# Patient Record
Sex: FEMALE | Race: WHITE | NOT HISPANIC OR LATINO | Employment: OTHER | URBAN - METROPOLITAN AREA
[De-identification: names, ages, dates, MRNs, and addresses within clinical notes are randomized per-mention and may not be internally consistent; named-entity substitution may affect disease eponyms.]

---

## 2018-04-06 ENCOUNTER — TRANSCRIBE ORDERS (OUTPATIENT)
Dept: ADMINISTRATIVE | Facility: HOSPITAL | Age: 63
End: 2018-04-06

## 2018-04-06 DIAGNOSIS — I71.9 AORTIC ANEURYSM WITHOUT RUPTURE, UNSPECIFIED PORTION OF AORTA (HCC): Primary | ICD-10-CM

## 2018-04-13 ENCOUNTER — HOSPITAL ENCOUNTER (OUTPATIENT)
Dept: CT IMAGING | Facility: HOSPITAL | Age: 63
Discharge: HOME | End: 2018-04-13
Attending: FAMILY MEDICINE | Admitting: FAMILY MEDICINE

## 2018-04-13 DIAGNOSIS — I71.9 AORTIC ANEURYSM WITHOUT RUPTURE, UNSPECIFIED PORTION OF AORTA (HCC): ICD-10-CM

## 2018-04-13 PROCEDURE — 71250 CT THORAX DX C-: CPT

## 2018-06-25 ENCOUNTER — HOSPITAL ENCOUNTER (OUTPATIENT)
Facility: HOSPITAL | Age: 63
Setting detail: OBSERVATION
End: 2018-06-26
Attending: EMERGENCY MEDICINE | Admitting: FAMILY MEDICINE

## 2018-06-25 ENCOUNTER — APPOINTMENT (OUTPATIENT)
Dept: CT IMAGING | Facility: HOSPITAL | Age: 63
End: 2018-06-25

## 2018-06-25 DIAGNOSIS — R11.2 NON-INTRACTABLE VOMITING WITH NAUSEA, UNSPECIFIED VOMITING TYPE: ICD-10-CM

## 2018-06-25 DIAGNOSIS — R10.13 EPIGASTRIC PAIN: Primary | ICD-10-CM

## 2018-06-25 DIAGNOSIS — R77.8 ELEVATED TROPONIN: ICD-10-CM

## 2018-06-25 LAB
ALBUMIN SERPL-MCNC: 4.6 G/DL (ref 3.5–5.2)
ALBUMIN/GLOB SERPL: 1.8 G/DL
ALP SERPL-CCNC: 77 U/L (ref 40–129)
ALT SERPL W P-5'-P-CCNC: 23 U/L (ref 5–33)
ANION GAP SERPL CALCULATED.3IONS-SCNC: 17.5 MMOL/L
AST SERPL-CCNC: 26 U/L (ref 5–32)
BACTERIA UR QL AUTO: ABNORMAL /HPF
BASOPHILS # BLD AUTO: 0.04 10*3/MM3 (ref 0–0.2)
BASOPHILS NFR BLD AUTO: 0.3 % (ref 0–2)
BILIRUB SERPL-MCNC: 0.5 MG/DL (ref 0.2–1.2)
BILIRUB UR QL STRIP: NEGATIVE
BUN BLD-MCNC: 16 MG/DL (ref 8–23)
BUN/CREAT SERPL: 16 (ref 7–25)
CALCIUM SPEC-SCNC: 9.9 MG/DL (ref 8.8–10.5)
CHLORIDE SERPL-SCNC: 100 MMOL/L (ref 98–107)
CLARITY UR: CLEAR
CO2 SERPL-SCNC: 22.5 MMOL/L (ref 22–29)
COLOR UR: YELLOW
CREAT BLD-MCNC: 1 MG/DL (ref 0.57–1)
DEPRECATED RDW RBC AUTO: 42.6 FL (ref 37–54)
EOSINOPHIL # BLD AUTO: 0 10*3/MM3 (ref 0.1–0.3)
EOSINOPHIL NFR BLD AUTO: 0 % (ref 0–4)
ERYTHROCYTE [DISTWIDTH] IN BLOOD BY AUTOMATED COUNT: 13.1 % (ref 11.5–14.5)
GFR SERPL CREATININE-BSD FRML MDRD: 56 ML/MIN/1.73
GLOBULIN UR ELPH-MCNC: 2.6 GM/DL
GLUCOSE BLD-MCNC: 147 MG/DL (ref 65–99)
GLUCOSE UR STRIP-MCNC: NEGATIVE MG/DL
HCT VFR BLD AUTO: 40.3 % (ref 37–47)
HGB BLD-MCNC: 13.9 G/DL (ref 12–16)
HGB UR QL STRIP.AUTO: NEGATIVE
HYALINE CASTS UR QL AUTO: ABNORMAL /LPF
IMM GRANULOCYTES # BLD: 0.05 10*3/MM3 (ref 0–0.03)
IMM GRANULOCYTES NFR BLD: 0.4 % (ref 0–0.5)
KETONES UR QL STRIP: ABNORMAL
LEUKOCYTE ESTERASE UR QL STRIP.AUTO: NEGATIVE
LIPASE SERPL-CCNC: 47 U/L (ref 13–60)
LYMPHOCYTES # BLD AUTO: 1.46 10*3/MM3 (ref 0.6–4.8)
LYMPHOCYTES NFR BLD AUTO: 10.3 % (ref 20–45)
MCH RBC QN AUTO: 30.8 PG (ref 27–31)
MCHC RBC AUTO-ENTMCNC: 34.5 G/DL (ref 31–37)
MCV RBC AUTO: 89.2 FL (ref 81–99)
MONOCYTES # BLD AUTO: 0.7 10*3/MM3 (ref 0–1)
MONOCYTES NFR BLD AUTO: 4.9 % (ref 3–8)
NEUTROPHILS # BLD AUTO: 11.91 10*3/MM3 (ref 1.5–8.3)
NEUTROPHILS NFR BLD AUTO: 84.1 % (ref 45–70)
NITRITE UR QL STRIP: NEGATIVE
NRBC BLD MANUAL-RTO: 0 /100 WBC (ref 0–0)
PH UR STRIP.AUTO: 7.5 [PH] (ref 4.5–8)
PLATELET # BLD AUTO: 216 10*3/MM3 (ref 140–500)
PMV BLD AUTO: 11.4 FL (ref 7.4–10.4)
POTASSIUM BLD-SCNC: 3.9 MMOL/L (ref 3.5–5.2)
PROT SERPL-MCNC: 7.2 G/DL (ref 6–8.5)
PROT UR QL STRIP: ABNORMAL
RBC # BLD AUTO: 4.52 10*6/MM3 (ref 4.2–5.4)
RBC # UR: ABNORMAL /HPF
REF LAB TEST METHOD: ABNORMAL
SODIUM BLD-SCNC: 140 MMOL/L (ref 136–145)
SP GR UR STRIP: 1.01 (ref 1–1.03)
SQUAMOUS #/AREA URNS HPF: ABNORMAL /HPF
TROPONIN T SERPL-MCNC: 0.11 NG/ML (ref 0–0.03)
UROBILINOGEN UR QL STRIP: ABNORMAL
WBC NRBC COR # BLD: 14.16 10*3/MM3 (ref 4.8–10.8)
WBC UR QL AUTO: ABNORMAL /HPF

## 2018-06-25 PROCEDURE — 96372 THER/PROPH/DIAG INJ SC/IM: CPT

## 2018-06-25 PROCEDURE — 83690 ASSAY OF LIPASE: CPT | Performed by: PHYSICIAN ASSISTANT

## 2018-06-25 PROCEDURE — 96361 HYDRATE IV INFUSION ADD-ON: CPT

## 2018-06-25 PROCEDURE — 80053 COMPREHEN METABOLIC PANEL: CPT | Performed by: PHYSICIAN ASSISTANT

## 2018-06-25 PROCEDURE — 25010000002 ONDANSETRON PER 1 MG: Performed by: PHYSICIAN ASSISTANT

## 2018-06-25 PROCEDURE — 84484 ASSAY OF TROPONIN QUANT: CPT | Performed by: PHYSICIAN ASSISTANT

## 2018-06-25 PROCEDURE — 96374 THER/PROPH/DIAG INJ IV PUSH: CPT

## 2018-06-25 PROCEDURE — G0378 HOSPITAL OBSERVATION PER HR: HCPCS

## 2018-06-25 PROCEDURE — 96376 TX/PRO/DX INJ SAME DRUG ADON: CPT

## 2018-06-25 PROCEDURE — 25010000002 IOPAMIDOL 61 % SOLUTION: Performed by: EMERGENCY MEDICINE

## 2018-06-25 PROCEDURE — 74177 CT ABD & PELVIS W/CONTRAST: CPT

## 2018-06-25 PROCEDURE — 96375 TX/PRO/DX INJ NEW DRUG ADDON: CPT

## 2018-06-25 PROCEDURE — 99285 EMERGENCY DEPT VISIT HI MDM: CPT

## 2018-06-25 PROCEDURE — 85025 COMPLETE CBC W/AUTO DIFF WBC: CPT | Performed by: PHYSICIAN ASSISTANT

## 2018-06-25 PROCEDURE — 25010000002 HYDROMORPHONE PER 4 MG: Performed by: EMERGENCY MEDICINE

## 2018-06-25 PROCEDURE — 81001 URINALYSIS AUTO W/SCOPE: CPT | Performed by: PHYSICIAN ASSISTANT

## 2018-06-25 PROCEDURE — 93005 ELECTROCARDIOGRAM TRACING: CPT | Performed by: PHYSICIAN ASSISTANT

## 2018-06-25 PROCEDURE — 25010000002 ONDANSETRON PER 1 MG

## 2018-06-25 PROCEDURE — 25010000002 ENOXAPARIN PER 10 MG: Performed by: PHYSICIAN ASSISTANT

## 2018-06-25 RX ORDER — SODIUM CHLORIDE 9 MG/ML
40 INJECTION, SOLUTION INTRAVENOUS AS NEEDED
Status: DISCONTINUED | OUTPATIENT
Start: 2018-06-25 | End: 2018-06-26 | Stop reason: HOSPADM

## 2018-06-25 RX ORDER — SODIUM CHLORIDE 0.9 % (FLUSH) 0.9 %
1-10 SYRINGE (ML) INJECTION AS NEEDED
Status: DISCONTINUED | OUTPATIENT
Start: 2018-06-25 | End: 2018-06-26 | Stop reason: HOSPADM

## 2018-06-25 RX ORDER — FAMOTIDINE 20 MG/50ML
20 INJECTION, SOLUTION INTRAVENOUS ONCE
Status: COMPLETED | OUTPATIENT
Start: 2018-06-25 | End: 2018-06-25

## 2018-06-25 RX ORDER — FAMOTIDINE 20 MG/50ML
20 INJECTION, SOLUTION INTRAVENOUS EVERY 12 HOURS SCHEDULED
Status: DISCONTINUED | OUTPATIENT
Start: 2018-06-25 | End: 2018-06-26 | Stop reason: CLARIF

## 2018-06-25 RX ORDER — AMITRIPTYLINE HYDROCHLORIDE 25 MG/1
12.5 TABLET, FILM COATED ORAL NIGHTLY
COMMUNITY
End: 2020-01-27

## 2018-06-25 RX ORDER — BISACODYL 5 MG/1
5 TABLET, DELAYED RELEASE ORAL DAILY PRN
Status: DISCONTINUED | OUTPATIENT
Start: 2018-06-25 | End: 2018-06-26 | Stop reason: HOSPADM

## 2018-06-25 RX ORDER — ASPIRIN 81 MG/1
324 TABLET, CHEWABLE ORAL ONCE
Status: COMPLETED | OUTPATIENT
Start: 2018-06-25 | End: 2018-06-25

## 2018-06-25 RX ORDER — AMITRIPTYLINE HYDROCHLORIDE 25 MG/1
12.5 TABLET, FILM COATED ORAL NIGHTLY
Status: DISCONTINUED | OUTPATIENT
Start: 2018-06-25 | End: 2018-06-26 | Stop reason: HOSPADM

## 2018-06-25 RX ORDER — UREA 10 %
140 LOTION (ML) TOPICAL NIGHTLY
COMMUNITY

## 2018-06-25 RX ORDER — ONDANSETRON 2 MG/ML
4 INJECTION INTRAMUSCULAR; INTRAVENOUS ONCE
Status: COMPLETED | OUTPATIENT
Start: 2018-06-25 | End: 2018-06-25

## 2018-06-25 RX ORDER — ACETAMINOPHEN 325 MG/1
650 TABLET ORAL EVERY 4 HOURS PRN
Status: DISCONTINUED | OUTPATIENT
Start: 2018-06-25 | End: 2018-06-26 | Stop reason: HOSPADM

## 2018-06-25 RX ORDER — ASPIRIN 81 MG/1
81 TABLET ORAL DAILY
Status: DISCONTINUED | OUTPATIENT
Start: 2018-06-25 | End: 2018-06-26 | Stop reason: HOSPADM

## 2018-06-25 RX ORDER — IBUPROFEN 800 MG/1
800 TABLET ORAL EVERY 6 HOURS PRN
COMMUNITY
End: 2018-06-28 | Stop reason: HOSPADM

## 2018-06-25 RX ORDER — ASPIRIN 81 MG/1
81 TABLET ORAL DAILY
COMMUNITY
End: 2018-06-28 | Stop reason: HOSPADM

## 2018-06-25 RX ORDER — TRAZODONE HYDROCHLORIDE 100 MG/1
100 TABLET ORAL NIGHTLY
COMMUNITY

## 2018-06-25 RX ORDER — CALCIUM CARBONATE 200(500)MG
1 TABLET,CHEWABLE ORAL 2 TIMES DAILY PRN
Status: DISCONTINUED | OUTPATIENT
Start: 2018-06-25 | End: 2018-06-26 | Stop reason: HOSPADM

## 2018-06-25 RX ORDER — ONDANSETRON 2 MG/ML
INJECTION INTRAMUSCULAR; INTRAVENOUS
Status: COMPLETED
Start: 2018-06-25 | End: 2018-06-25

## 2018-06-25 RX ORDER — TRAZODONE HYDROCHLORIDE 50 MG/1
100 TABLET ORAL NIGHTLY
Status: DISCONTINUED | OUTPATIENT
Start: 2018-06-25 | End: 2018-06-26 | Stop reason: HOSPADM

## 2018-06-25 RX ORDER — LOSARTAN POTASSIUM 50 MG/1
25 TABLET ORAL DAILY
Status: DISCONTINUED | OUTPATIENT
Start: 2018-06-25 | End: 2018-06-26 | Stop reason: HOSPADM

## 2018-06-25 RX ORDER — SODIUM CHLORIDE, SODIUM LACTATE, POTASSIUM CHLORIDE, CALCIUM CHLORIDE 600; 310; 30; 20 MG/100ML; MG/100ML; MG/100ML; MG/100ML
1000 INJECTION, SOLUTION INTRAVENOUS ONCE
Status: COMPLETED | OUTPATIENT
Start: 2018-06-25 | End: 2018-06-25

## 2018-06-25 RX ORDER — LOSARTAN POTASSIUM 25 MG/1
25 TABLET ORAL NIGHTLY
COMMUNITY

## 2018-06-25 RX ORDER — SODIUM CHLORIDE 0.9 % (FLUSH) 0.9 %
10 SYRINGE (ML) INJECTION AS NEEDED
Status: DISCONTINUED | OUTPATIENT
Start: 2018-06-25 | End: 2018-06-26 | Stop reason: HOSPADM

## 2018-06-25 RX ADMIN — LOSARTAN POTASSIUM 25 MG: 50 TABLET, FILM COATED ORAL at 22:37

## 2018-06-25 RX ADMIN — AMITRIPTYLINE HYDROCHLORIDE 12.5 MG: 25 TABLET, FILM COATED ORAL at 22:36

## 2018-06-25 RX ADMIN — ASPIRIN 81 MG: 81 TABLET, COATED ORAL at 22:37

## 2018-06-25 RX ADMIN — ENOXAPARIN SODIUM 60 MG: 60 INJECTION SUBCUTANEOUS at 18:25

## 2018-06-25 RX ADMIN — ONDANSETRON 4 MG: 2 INJECTION INTRAMUSCULAR; INTRAVENOUS at 20:28

## 2018-06-25 RX ADMIN — FAMOTIDINE 20 MG: 20 INJECTION, SOLUTION INTRAVENOUS at 16:31

## 2018-06-25 RX ADMIN — HYDROMORPHONE HYDROCHLORIDE 0.5 MG: 1 INJECTION, SOLUTION INTRAMUSCULAR; INTRAVENOUS; SUBCUTANEOUS at 16:25

## 2018-06-25 RX ADMIN — SODIUM CHLORIDE, POTASSIUM CHLORIDE, SODIUM LACTATE AND CALCIUM CHLORIDE 1000 ML: 600; 310; 30; 20 INJECTION, SOLUTION INTRAVENOUS at 16:25

## 2018-06-25 RX ADMIN — ASPIRIN 81 MG 324 MG: 81 TABLET ORAL at 18:25

## 2018-06-25 RX ADMIN — ONDANSETRON 4 MG: 2 INJECTION, SOLUTION INTRAMUSCULAR; INTRAVENOUS at 16:26

## 2018-06-25 RX ADMIN — FAMOTIDINE 20 MG: 20 INJECTION, SOLUTION INTRAVENOUS at 22:38

## 2018-06-25 RX ADMIN — IOPAMIDOL 100 ML: 612 INJECTION, SOLUTION INTRAVENOUS at 17:49

## 2018-06-25 RX ADMIN — ONDANSETRON 4 MG: 2 INJECTION, SOLUTION INTRAMUSCULAR; INTRAVENOUS at 20:28

## 2018-06-25 RX ADMIN — TRAZODONE HYDROCHLORIDE 100 MG: 50 TABLET ORAL at 22:36

## 2018-06-26 ENCOUNTER — HOSPITAL ENCOUNTER (INPATIENT)
Facility: HOSPITAL | Age: 63
LOS: 2 days | Discharge: HOME | End: 2018-06-28
Attending: INTERNAL MEDICINE | Admitting: INTERNAL MEDICINE

## 2018-06-26 ENCOUNTER — APPOINTMENT (OUTPATIENT)
Dept: CARDIOLOGY | Facility: HOSPITAL | Age: 63
End: 2018-06-26
Attending: FAMILY MEDICINE

## 2018-06-26 VITALS
RESPIRATION RATE: 16 BRPM | HEIGHT: 64 IN | DIASTOLIC BLOOD PRESSURE: 61 MMHG | OXYGEN SATURATION: 95 % | HEART RATE: 77 BPM | SYSTOLIC BLOOD PRESSURE: 102 MMHG | TEMPERATURE: 98.7 F | BODY MASS INDEX: 21.34 KG/M2 | WEIGHT: 125 LBS

## 2018-06-26 DIAGNOSIS — R10.13 EPIGASTRIC PAIN: Primary | ICD-10-CM

## 2018-06-26 DIAGNOSIS — R11.14 BILIOUS VOMITING WITH NAUSEA: ICD-10-CM

## 2018-06-26 DIAGNOSIS — I21.4 NSTEMI (NON-ST ELEVATED MYOCARDIAL INFARCTION) (HCC): Primary | ICD-10-CM

## 2018-06-26 DIAGNOSIS — I21.4 NSTEMI (NON-ST ELEVATED MYOCARDIAL INFARCTION) (HCC): ICD-10-CM

## 2018-06-26 PROBLEM — R11.2 NAUSEA AND VOMITING: Status: ACTIVE | Noted: 2018-06-26

## 2018-06-26 PROBLEM — R77.8 ELEVATED TROPONIN: Status: ACTIVE | Noted: 2018-06-26

## 2018-06-26 PROBLEM — R79.89 ELEVATED TROPONIN: Status: ACTIVE | Noted: 2018-06-26

## 2018-06-26 LAB
AORTIC ARCH: 2.23 CM
AORTIC DIMENSIONLESS INDEX: 0.8 (DI)
ASCENDING AORTA: 2.3 CM
BH CV ECHO MEAS - ACS: 2 CM
BH CV ECHO MEAS - AO MAX PG (FULL): 2.1 MMHG
BH CV ECHO MEAS - AO MAX PG: 6.7 MMHG
BH CV ECHO MEAS - AO MEAN PG (FULL): 2 MMHG
BH CV ECHO MEAS - AO MEAN PG: 4 MMHG
BH CV ECHO MEAS - AO ROOT AREA (BSA CORRECTED): 1.8
BH CV ECHO MEAS - AO ROOT AREA: 6.6 CM^2
BH CV ECHO MEAS - AO ROOT DIAM: 2.9 CM
BH CV ECHO MEAS - AO V2 MAX: 129 CM/SEC
BH CV ECHO MEAS - AO V2 MEAN: 89.6 CM/SEC
BH CV ECHO MEAS - AO V2 VTI: 27.4 CM
BH CV ECHO MEAS - ASC AORTA: 2.3 CM
BH CV ECHO MEAS - AVA(I,A): 2.4 CM^2
BH CV ECHO MEAS - AVA(I,D): 2.4 CM^2
BH CV ECHO MEAS - AVA(V,A): 2.6 CM^2
BH CV ECHO MEAS - AVA(V,D): 2.6 CM^2
BH CV ECHO MEAS - BSA(HAYCOCK): 1.6 M^2
BH CV ECHO MEAS - BSA: 1.6 M^2
BH CV ECHO MEAS - BZI_BMI: 21.5 KILOGRAMS/M^2
BH CV ECHO MEAS - BZI_METRIC_HEIGHT: 162.6 CM
BH CV ECHO MEAS - BZI_METRIC_WEIGHT: 56.7 KG
BH CV ECHO MEAS - CONTRAST EF (2CH): 56.8 ML/M^2
BH CV ECHO MEAS - CONTRAST EF 4CH: 63.4 ML/M^2
BH CV ECHO MEAS - EDV(CUBED): 109.6 ML
BH CV ECHO MEAS - EDV(MOD-SP2): 73.2 ML
BH CV ECHO MEAS - EDV(MOD-SP4): 74.5 ML
BH CV ECHO MEAS - EDV(TEICH): 106.7 ML
BH CV ECHO MEAS - EF(CUBED): 63.6 %
BH CV ECHO MEAS - EF(MOD-BP): 57 %
BH CV ECHO MEAS - EF(MOD-SP2): 56.8 %
BH CV ECHO MEAS - EF(MOD-SP4): 63.4 %
BH CV ECHO MEAS - EF(TEICH): 55.1 %
BH CV ECHO MEAS - ESV(CUBED): 39.8 ML
BH CV ECHO MEAS - ESV(MOD-SP2): 31.6 ML
BH CV ECHO MEAS - ESV(MOD-SP4): 27.3 ML
BH CV ECHO MEAS - ESV(TEICH): 47.9 ML
BH CV ECHO MEAS - FS: 28.6 %
BH CV ECHO MEAS - IVS/LVPW: 0.9
BH CV ECHO MEAS - IVSD: 0.87 CM
BH CV ECHO MEAS - LAT PEAK E' VEL: 11 CM/SEC
BH CV ECHO MEAS - LV DIASTOLIC VOL/BSA (35-75): 46.5 ML/M^2
BH CV ECHO MEAS - LV MASS(C)D: 152.3 GRAMS
BH CV ECHO MEAS - LV MASS(C)DI: 95 GRAMS/M^2
BH CV ECHO MEAS - LV MAX PG: 4.6 MMHG
BH CV ECHO MEAS - LV MEAN PG: 2 MMHG
BH CV ECHO MEAS - LV SYSTOLIC VOL/BSA (12-30): 17 ML/M^2
BH CV ECHO MEAS - LV V1 MAX: 107 CM/SEC
BH CV ECHO MEAS - LV V1 MEAN: 71.4 CM/SEC
BH CV ECHO MEAS - LV V1 VTI: 20.6 CM
BH CV ECHO MEAS - LVIDD: 4.8 CM
BH CV ECHO MEAS - LVIDS: 3.4 CM
BH CV ECHO MEAS - LVLD AP2: 7.1 CM
BH CV ECHO MEAS - LVLD AP4: 7.3 CM
BH CV ECHO MEAS - LVLS AP2: 6 CM
BH CV ECHO MEAS - LVLS AP4: 5.7 CM
BH CV ECHO MEAS - LVOT AREA (M): 3.1 CM^2
BH CV ECHO MEAS - LVOT AREA: 3.1 CM^2
BH CV ECHO MEAS - LVOT DIAM: 2 CM
BH CV ECHO MEAS - LVPWD: 0.98 CM
BH CV ECHO MEAS - MED PEAK E' VEL: 7 CM/SEC
BH CV ECHO MEAS - MV A DUR: 0.1 SEC
BH CV ECHO MEAS - MV A MAX VEL: 73.7 CM/SEC
BH CV ECHO MEAS - MV DEC SLOPE: 427 CM/SEC^2
BH CV ECHO MEAS - MV DEC TIME: 0.16 SEC
BH CV ECHO MEAS - MV E MAX VEL: 92.6 CM/SEC
BH CV ECHO MEAS - MV E/A: 1.3
BH CV ECHO MEAS - MV MAX PG: 4.2 MMHG
BH CV ECHO MEAS - MV MEAN PG: 3 MMHG
BH CV ECHO MEAS - MV P1/2T MAX VEL: 96.4 CM/SEC
BH CV ECHO MEAS - MV P1/2T: 66.1 MSEC
BH CV ECHO MEAS - MV V2 MAX: 102 CM/SEC
BH CV ECHO MEAS - MV V2 MEAN: 76.7 CM/SEC
BH CV ECHO MEAS - MV V2 VTI: 30.9 CM
BH CV ECHO MEAS - MVA P1/2T LCG: 2.3 CM^2
BH CV ECHO MEAS - MVA(P1/2T): 3.3 CM^2
BH CV ECHO MEAS - MVA(VTI): 2.1 CM^2
BH CV ECHO MEAS - PA ACC TIME: 0.11 SEC
BH CV ECHO MEAS - PA MAX PG (FULL): 0.98 MMHG
BH CV ECHO MEAS - PA MAX PG: 3.6 MMHG
BH CV ECHO MEAS - PA PR(ACCEL): 31.3 MMHG
BH CV ECHO MEAS - PA V2 MAX: 95.2 CM/SEC
BH CV ECHO MEAS - PI END-D VEL: 120 CM/SEC
BH CV ECHO MEAS - PULM A REVS DUR: 0.11 SEC
BH CV ECHO MEAS - PULM A REVS VEL: 44.1 CM/SEC
BH CV ECHO MEAS - PULM DIAS VEL: 70.8 CM/SEC
BH CV ECHO MEAS - PULM S/D: 1.1
BH CV ECHO MEAS - PULM SYS VEL: 74.7 CM/SEC
BH CV ECHO MEAS - PVA(V,A): 2.7 CM^2
BH CV ECHO MEAS - PVA(V,D): 2.7 CM^2
BH CV ECHO MEAS - QP/QS: 0.85
BH CV ECHO MEAS - RAP SYSTOLE: 3 MMHG
BH CV ECHO MEAS - RV MAX PG: 2.6 MMHG
BH CV ECHO MEAS - RV MEAN PG: 2 MMHG
BH CV ECHO MEAS - RV V1 MAX: 81.3 CM/SEC
BH CV ECHO MEAS - RV V1 MEAN: 58.8 CM/SEC
BH CV ECHO MEAS - RV V1 VTI: 17.5 CM
BH CV ECHO MEAS - RVOT AREA: 3.1 CM^2
BH CV ECHO MEAS - RVOT DIAM: 2 CM
BH CV ECHO MEAS - RVSP: 47 MMHG
BH CV ECHO MEAS - SI(AO): 113 ML/M^2
BH CV ECHO MEAS - SI(CUBED): 43.5 ML/M^2
BH CV ECHO MEAS - SI(LVOT): 40.4 ML/M^2
BH CV ECHO MEAS - SI(MOD-SP2): 26 ML/M^2
BH CV ECHO MEAS - SI(MOD-SP4): 29.5 ML/M^2
BH CV ECHO MEAS - SI(TEICH): 36.7 ML/M^2
BH CV ECHO MEAS - SUP REN AO DIAM: 2 CM
BH CV ECHO MEAS - SV(AO): 181 ML
BH CV ECHO MEAS - SV(CUBED): 69.7 ML
BH CV ECHO MEAS - SV(LVOT): 64.7 ML
BH CV ECHO MEAS - SV(MOD-SP2): 41.6 ML
BH CV ECHO MEAS - SV(MOD-SP4): 47.2 ML
BH CV ECHO MEAS - SV(RVOT): 55 ML
BH CV ECHO MEAS - SV(TEICH): 58.8 ML
BH CV ECHO MEAS - TAPSE (>1.6): 3.34 CM2
BH CV ECHO MEAS - TR MAX VEL: 334 CM/SEC
BH CV ECHO MEASUREMENTS AVERAGE E/E' RATIO: 10.29
BH CV VAS BP RIGHT ARM: NORMAL MMHG
BH CV XLRA - RV BASE: 3 CM
BH CV XLRA - TDI S': 12.8 CM/SEC
LEFT ATRIUM VOLUME INDEX: 28 ML/M2
MAXIMAL PREDICTED HEART RATE: 156 BPM
STRESS TARGET HR: 133 BPM
TROPONIN T SERPL-MCNC: 0.06 NG/ML (ref 0–0.03)
TROPONIN T SERPL-MCNC: 0.09 NG/ML (ref 0–0.03)

## 2018-06-26 PROCEDURE — 93005 ELECTROCARDIOGRAM TRACING: CPT | Performed by: FAMILY MEDICINE

## 2018-06-26 PROCEDURE — 25010000002 FENTANYL CITRATE (PF) 100 MCG/2ML SOLUTION: Performed by: INTERNAL MEDICINE

## 2018-06-26 PROCEDURE — 84484 ASSAY OF TROPONIN QUANT: CPT | Performed by: FAMILY MEDICINE

## 2018-06-26 PROCEDURE — 93458 L HRT ARTERY/VENTRICLE ANGIO: CPT | Performed by: INTERNAL MEDICINE

## 2018-06-26 PROCEDURE — G0378 HOSPITAL OBSERVATION PER HR: HCPCS

## 2018-06-26 PROCEDURE — B2151ZZ FLUOROSCOPY OF LEFT HEART USING LOW OSMOLAR CONTRAST: ICD-10-PCS | Performed by: INTERNAL MEDICINE

## 2018-06-26 PROCEDURE — 94799 UNLISTED PULMONARY SVC/PX: CPT

## 2018-06-26 PROCEDURE — 25010000002 ONDANSETRON PER 1 MG: Performed by: INTERNAL MEDICINE

## 2018-06-26 PROCEDURE — 25010000002 MIDAZOLAM PER 1 MG: Performed by: INTERNAL MEDICINE

## 2018-06-26 PROCEDURE — 4A023N7 MEASUREMENT OF CARDIAC SAMPLING AND PRESSURE, LEFT HEART, PERCUTANEOUS APPROACH: ICD-10-PCS | Performed by: INTERNAL MEDICINE

## 2018-06-26 PROCEDURE — 93306 TTE W/DOPPLER COMPLETE: CPT

## 2018-06-26 PROCEDURE — 0 IOPAMIDOL PER 1 ML: Performed by: INTERNAL MEDICINE

## 2018-06-26 PROCEDURE — 25010000002 HEPARIN (PORCINE) PER 1000 UNITS: Performed by: INTERNAL MEDICINE

## 2018-06-26 PROCEDURE — 96376 TX/PRO/DX INJ SAME DRUG ADON: CPT

## 2018-06-26 PROCEDURE — C1894 INTRO/SHEATH, NON-LASER: HCPCS | Performed by: INTERNAL MEDICINE

## 2018-06-26 PROCEDURE — C1769 GUIDE WIRE: HCPCS | Performed by: INTERNAL MEDICINE

## 2018-06-26 PROCEDURE — 99222 1ST HOSP IP/OBS MODERATE 55: CPT | Performed by: INTERNAL MEDICINE

## 2018-06-26 PROCEDURE — 93306 TTE W/DOPPLER COMPLETE: CPT | Performed by: INTERNAL MEDICINE

## 2018-06-26 PROCEDURE — B2111ZZ FLUOROSCOPY OF MULTIPLE CORONARY ARTERIES USING LOW OSMOLAR CONTRAST: ICD-10-PCS | Performed by: INTERNAL MEDICINE

## 2018-06-26 RX ORDER — AMITRIPTYLINE HYDROCHLORIDE 25 MG/1
12.5 TABLET, FILM COATED ORAL NIGHTLY
Status: CANCELLED | OUTPATIENT
Start: 2018-06-26

## 2018-06-26 RX ORDER — AMITRIPTYLINE HYDROCHLORIDE 25 MG/1
12.5 TABLET, FILM COATED ORAL NIGHTLY
Status: DISCONTINUED | OUTPATIENT
Start: 2018-06-26 | End: 2018-06-28 | Stop reason: HOSPADM

## 2018-06-26 RX ORDER — ACETAMINOPHEN 325 MG/1
650 TABLET ORAL EVERY 4 HOURS PRN
Status: DISCONTINUED | OUTPATIENT
Start: 2018-06-26 | End: 2018-06-28 | Stop reason: HOSPADM

## 2018-06-26 RX ORDER — LOSARTAN POTASSIUM 50 MG/1
25 TABLET ORAL DAILY
Status: CANCELLED | OUTPATIENT
Start: 2018-06-26

## 2018-06-26 RX ORDER — LIDOCAINE HYDROCHLORIDE 20 MG/ML
INJECTION, SOLUTION INFILTRATION; PERINEURAL AS NEEDED
Status: DISCONTINUED | OUTPATIENT
Start: 2018-06-26 | End: 2018-06-26 | Stop reason: HOSPADM

## 2018-06-26 RX ORDER — ONDANSETRON 2 MG/ML
4 INJECTION INTRAMUSCULAR; INTRAVENOUS EVERY 6 HOURS PRN
Status: DISCONTINUED | OUTPATIENT
Start: 2018-06-26 | End: 2018-06-28 | Stop reason: HOSPADM

## 2018-06-26 RX ORDER — UREA 10 %
140 LOTION (ML) TOPICAL
Status: CANCELLED | OUTPATIENT
Start: 2018-06-26

## 2018-06-26 RX ORDER — SODIUM CHLORIDE 0.9 % (FLUSH) 0.9 %
1-10 SYRINGE (ML) INJECTION AS NEEDED
Status: CANCELLED | OUTPATIENT
Start: 2018-06-26

## 2018-06-26 RX ORDER — LOSARTAN POTASSIUM 50 MG/1
25 TABLET ORAL DAILY
Status: CANCELLED | OUTPATIENT
Start: 2018-06-27

## 2018-06-26 RX ORDER — ACETAMINOPHEN 325 MG/1
650 TABLET ORAL EVERY 4 HOURS PRN
Status: CANCELLED | OUTPATIENT
Start: 2018-06-26

## 2018-06-26 RX ORDER — UREA 10 %
140 LOTION (ML) TOPICAL
Status: DISCONTINUED | OUTPATIENT
Start: 2018-06-26 | End: 2018-06-28 | Stop reason: HOSPADM

## 2018-06-26 RX ORDER — FENTANYL CITRATE 50 UG/ML
INJECTION, SOLUTION INTRAMUSCULAR; INTRAVENOUS AS NEEDED
Status: DISCONTINUED | OUTPATIENT
Start: 2018-06-26 | End: 2018-06-26 | Stop reason: HOSPADM

## 2018-06-26 RX ORDER — MIDAZOLAM HYDROCHLORIDE 1 MG/ML
1 INJECTION INTRAMUSCULAR; INTRAVENOUS
Status: DISCONTINUED | OUTPATIENT
Start: 2018-06-26 | End: 2018-06-26

## 2018-06-26 RX ORDER — SODIUM CHLORIDE 9 MG/ML
75 INJECTION, SOLUTION INTRAVENOUS CONTINUOUS
Status: DISCONTINUED | OUTPATIENT
Start: 2018-06-26 | End: 2018-06-26

## 2018-06-26 RX ORDER — ONDANSETRON 2 MG/ML
4 INJECTION INTRAMUSCULAR; INTRAVENOUS EVERY 6 HOURS PRN
Status: DISCONTINUED | OUTPATIENT
Start: 2018-06-26 | End: 2018-06-26

## 2018-06-26 RX ORDER — NITROGLYCERIN 0.4 MG/1
0.4 TABLET SUBLINGUAL
Status: DISCONTINUED | OUTPATIENT
Start: 2018-06-26 | End: 2018-06-26

## 2018-06-26 RX ORDER — ASPIRIN 81 MG/1
81 TABLET ORAL NIGHTLY
Status: CANCELLED | OUTPATIENT
Start: 2018-06-26

## 2018-06-26 RX ORDER — CALCIUM CARBONATE 200(500)MG
1 TABLET,CHEWABLE ORAL 2 TIMES DAILY PRN
Status: CANCELLED | OUTPATIENT
Start: 2018-06-26

## 2018-06-26 RX ORDER — TRAZODONE HYDROCHLORIDE 100 MG/1
100 TABLET ORAL NIGHTLY
Status: DISCONTINUED | OUTPATIENT
Start: 2018-06-26 | End: 2018-06-28 | Stop reason: HOSPADM

## 2018-06-26 RX ORDER — ASPIRIN 81 MG/1
81 TABLET ORAL DAILY
Status: DISCONTINUED | OUTPATIENT
Start: 2018-06-27 | End: 2018-06-28 | Stop reason: HOSPADM

## 2018-06-26 RX ORDER — SODIUM CHLORIDE 0.9 % (FLUSH) 0.9 %
10 SYRINGE (ML) INJECTION AS NEEDED
Status: CANCELLED | OUTPATIENT
Start: 2018-06-26

## 2018-06-26 RX ORDER — METOCLOPRAMIDE HYDROCHLORIDE 5 MG/ML
10 INJECTION INTRAMUSCULAR; INTRAVENOUS EVERY 6 HOURS PRN
Status: DISCONTINUED | OUTPATIENT
Start: 2018-06-26 | End: 2018-06-26

## 2018-06-26 RX ORDER — ASPIRIN 81 MG/1
81 TABLET ORAL NIGHTLY
Status: DISCONTINUED | OUTPATIENT
Start: 2018-06-26 | End: 2018-06-26 | Stop reason: SDUPTHER

## 2018-06-26 RX ORDER — HYDROCODONE BITARTRATE AND ACETAMINOPHEN 5; 325 MG/1; MG/1
1 TABLET ORAL EVERY 4 HOURS PRN
Status: DISCONTINUED | OUTPATIENT
Start: 2018-06-26 | End: 2018-06-26

## 2018-06-26 RX ORDER — MIDAZOLAM HYDROCHLORIDE 1 MG/ML
INJECTION INTRAMUSCULAR; INTRAVENOUS AS NEEDED
Status: DISCONTINUED | OUTPATIENT
Start: 2018-06-26 | End: 2018-06-26 | Stop reason: HOSPADM

## 2018-06-26 RX ORDER — SODIUM CHLORIDE 0.9 % (FLUSH) 0.9 %
1-10 SYRINGE (ML) INJECTION AS NEEDED
Status: DISCONTINUED | OUTPATIENT
Start: 2018-06-26 | End: 2018-06-28 | Stop reason: HOSPADM

## 2018-06-26 RX ORDER — SODIUM CHLORIDE 9 MG/ML
50 INJECTION, SOLUTION INTRAVENOUS CONTINUOUS
Status: ACTIVE | OUTPATIENT
Start: 2018-06-26 | End: 2018-06-27

## 2018-06-26 RX ORDER — ONDANSETRON 4 MG/1
4 TABLET, FILM COATED ORAL EVERY 6 HOURS PRN
Status: DISCONTINUED | OUTPATIENT
Start: 2018-06-26 | End: 2018-06-28 | Stop reason: HOSPADM

## 2018-06-26 RX ORDER — BISACODYL 5 MG/1
5 TABLET, DELAYED RELEASE ORAL DAILY PRN
Status: DISCONTINUED | OUTPATIENT
Start: 2018-06-26 | End: 2018-06-28 | Stop reason: HOSPADM

## 2018-06-26 RX ORDER — BISACODYL 5 MG/1
5 TABLET, DELAYED RELEASE ORAL DAILY PRN
Status: CANCELLED | OUTPATIENT
Start: 2018-06-26

## 2018-06-26 RX ORDER — ASPIRIN 81 MG/1
81 TABLET ORAL DAILY
Status: CANCELLED | OUTPATIENT
Start: 2018-06-27

## 2018-06-26 RX ORDER — ALPRAZOLAM 0.25 MG/1
0.5 TABLET ORAL 2 TIMES DAILY PRN
Status: DISCONTINUED | OUTPATIENT
Start: 2018-06-26 | End: 2018-06-26

## 2018-06-26 RX ORDER — LOSARTAN POTASSIUM 25 MG/1
25 TABLET ORAL DAILY
Status: DISCONTINUED | OUTPATIENT
Start: 2018-06-27 | End: 2018-06-28 | Stop reason: HOSPADM

## 2018-06-26 RX ORDER — TRAZODONE HYDROCHLORIDE 100 MG/1
100 TABLET ORAL NIGHTLY
Status: DISCONTINUED | OUTPATIENT
Start: 2018-06-26 | End: 2018-06-26 | Stop reason: SDUPTHER

## 2018-06-26 RX ORDER — TRAZODONE HYDROCHLORIDE 50 MG/1
100 TABLET ORAL NIGHTLY
Status: CANCELLED | OUTPATIENT
Start: 2018-06-26

## 2018-06-26 RX ORDER — FENTANYL CITRATE 50 UG/ML
50 INJECTION, SOLUTION INTRAMUSCULAR; INTRAVENOUS
Status: DISCONTINUED | OUTPATIENT
Start: 2018-06-26 | End: 2018-06-26

## 2018-06-26 RX ORDER — SODIUM CHLORIDE 0.9 % (FLUSH) 0.9 %
1-10 SYRINGE (ML) INJECTION AS NEEDED
Status: DISCONTINUED | OUTPATIENT
Start: 2018-06-26 | End: 2018-06-26

## 2018-06-26 RX ORDER — CALCIUM CARBONATE 200(500)MG
1 TABLET,CHEWABLE ORAL 2 TIMES DAILY PRN
Status: DISCONTINUED | OUTPATIENT
Start: 2018-06-26 | End: 2018-06-28 | Stop reason: HOSPADM

## 2018-06-26 RX ORDER — ACETAMINOPHEN 325 MG/1
650 TABLET ORAL EVERY 4 HOURS PRN
Status: DISCONTINUED | OUTPATIENT
Start: 2018-06-26 | End: 2018-06-26

## 2018-06-26 RX ORDER — SODIUM CHLORIDE 9 MG/ML
40 INJECTION, SOLUTION INTRAVENOUS AS NEEDED
Status: DISCONTINUED | OUTPATIENT
Start: 2018-06-26 | End: 2018-06-28 | Stop reason: HOSPADM

## 2018-06-26 RX ORDER — ONDANSETRON 4 MG/1
4 TABLET, ORALLY DISINTEGRATING ORAL EVERY 6 HOURS PRN
Status: DISCONTINUED | OUTPATIENT
Start: 2018-06-26 | End: 2018-06-28 | Stop reason: HOSPADM

## 2018-06-26 RX ORDER — SODIUM CHLORIDE 9 MG/ML
40 INJECTION, SOLUTION INTRAVENOUS AS NEEDED
Status: CANCELLED | OUTPATIENT
Start: 2018-06-26

## 2018-06-26 RX ORDER — AMITRIPTYLINE HYDROCHLORIDE 25 MG/1
12.5 TABLET, FILM COATED ORAL NIGHTLY
Status: DISCONTINUED | OUTPATIENT
Start: 2018-06-26 | End: 2018-06-26 | Stop reason: SDUPTHER

## 2018-06-26 RX ORDER — SODIUM CHLORIDE 0.9 % (FLUSH) 0.9 %
10 SYRINGE (ML) INJECTION AS NEEDED
Status: DISCONTINUED | OUTPATIENT
Start: 2018-06-26 | End: 2018-06-28 | Stop reason: HOSPADM

## 2018-06-26 RX ORDER — PROMETHAZINE HYDROCHLORIDE 25 MG/ML
12.5 INJECTION, SOLUTION INTRAMUSCULAR; INTRAVENOUS EVERY 4 HOURS PRN
Status: DISCONTINUED | OUTPATIENT
Start: 2018-06-26 | End: 2018-06-26

## 2018-06-26 RX ORDER — FAMOTIDINE 10 MG/ML
20 INJECTION, SOLUTION INTRAVENOUS EVERY 12 HOURS SCHEDULED
Status: DISCONTINUED | OUTPATIENT
Start: 2018-06-26 | End: 2018-06-27

## 2018-06-26 RX ORDER — LOSARTAN POTASSIUM 25 MG/1
25 TABLET ORAL DAILY
Status: DISCONTINUED | OUTPATIENT
Start: 2018-06-26 | End: 2018-06-26 | Stop reason: SDUPTHER

## 2018-06-26 RX ORDER — HYDROCODONE BITARTRATE AND ACETAMINOPHEN 5; 325 MG/1; MG/1
1 TABLET ORAL EVERY 4 HOURS PRN
Status: DISCONTINUED | OUTPATIENT
Start: 2018-06-26 | End: 2018-06-28 | Stop reason: HOSPADM

## 2018-06-26 RX ADMIN — TRAZODONE HYDROCHLORIDE 100 MG: 100 TABLET, FILM COATED ORAL at 22:50

## 2018-06-26 RX ADMIN — AMITRIPTYLINE HYDROCHLORIDE 12.5 MG: 25 TABLET, FILM COATED ORAL at 22:50

## 2018-06-26 RX ADMIN — SODIUM CHLORIDE 50 ML/HR: 9 INJECTION, SOLUTION INTRAVENOUS at 17:02

## 2018-06-26 RX ADMIN — Medication 140 MG: at 18:19

## 2018-06-26 RX ADMIN — NITROGLYCERIN 0.4 MG: 0.4 TABLET SUBLINGUAL at 13:05

## 2018-06-26 RX ADMIN — ACETAMINOPHEN 650 MG: 325 TABLET, FILM COATED ORAL at 06:37

## 2018-06-26 RX ADMIN — ONDANSETRON 4 MG: 2 INJECTION INTRAMUSCULAR; INTRAVENOUS at 13:07

## 2018-06-26 RX ADMIN — ASPIRIN 81 MG: 81 TABLET, COATED ORAL at 09:34

## 2018-06-26 RX ADMIN — METOPROLOL TARTRATE 12.5 MG: 25 TABLET ORAL at 22:50

## 2018-06-26 RX ADMIN — LOSARTAN POTASSIUM 25 MG: 50 TABLET, FILM COATED ORAL at 09:33

## 2018-06-26 RX ADMIN — SODIUM CHLORIDE 75 ML/HR: 9 INJECTION, SOLUTION INTRAVENOUS at 12:28

## 2018-06-26 RX ADMIN — FAMOTIDINE 20 MG: 10 INJECTION, SOLUTION INTRAVENOUS at 22:49

## 2018-06-26 RX ADMIN — FAMOTIDINE 20 MG: 10 INJECTION, SOLUTION INTRAVENOUS at 09:33

## 2018-06-26 RX ADMIN — ONDANSETRON HYDROCHLORIDE 4 MG: 2 SOLUTION INTRAMUSCULAR; INTRAVENOUS at 23:58

## 2018-06-27 ENCOUNTER — ANESTHESIA (OUTPATIENT)
Dept: GASTROENTEROLOGY | Facility: HOSPITAL | Age: 63
End: 2018-06-27

## 2018-06-27 ENCOUNTER — APPOINTMENT (OUTPATIENT)
Dept: ULTRASOUND IMAGING | Facility: HOSPITAL | Age: 63
End: 2018-06-27

## 2018-06-27 ENCOUNTER — APPOINTMENT (OUTPATIENT)
Dept: ULTRASOUND IMAGING | Facility: HOSPITAL | Age: 63
End: 2018-06-27
Attending: INTERNAL MEDICINE

## 2018-06-27 ENCOUNTER — ANESTHESIA EVENT (OUTPATIENT)
Dept: GASTROENTEROLOGY | Facility: HOSPITAL | Age: 63
End: 2018-06-27

## 2018-06-27 PROBLEM — I21.A1 TYPE 2 MYOCARDIAL INFARCTION (HCC): Status: ACTIVE | Noted: 2018-06-27

## 2018-06-27 PROBLEM — I21.4 NSTEMI (NON-ST ELEVATED MYOCARDIAL INFARCTION) (HCC): Status: ACTIVE | Noted: 2018-06-27

## 2018-06-27 LAB
ANION GAP SERPL CALCULATED.3IONS-SCNC: 15.4 MMOL/L
BUN BLD-MCNC: 11 MG/DL (ref 8–23)
BUN/CREAT SERPL: 13.1 (ref 7–25)
CALCIUM SPEC-SCNC: 8.6 MG/DL (ref 8.6–10.5)
CHLORIDE SERPL-SCNC: 100 MMOL/L (ref 98–107)
CO2 SERPL-SCNC: 22.6 MMOL/L (ref 22–29)
CREAT BLD-MCNC: 0.84 MG/DL (ref 0.57–1)
DEPRECATED RDW RBC AUTO: 44.7 FL (ref 37–54)
ERYTHROCYTE [DISTWIDTH] IN BLOOD BY AUTOMATED COUNT: 13.4 % (ref 11.7–13)
GFR SERPL CREATININE-BSD FRML MDRD: 68 ML/MIN/1.73
GLUCOSE BLD-MCNC: 115 MG/DL (ref 65–99)
HCT VFR BLD AUTO: 36.8 % (ref 35.6–45.5)
HGB BLD-MCNC: 12 G/DL (ref 11.9–15.5)
MCH RBC QN AUTO: 30.1 PG (ref 26.9–32)
MCHC RBC AUTO-ENTMCNC: 32.6 G/DL (ref 32.4–36.3)
MCV RBC AUTO: 92.2 FL (ref 80.5–98.2)
PLATELET # BLD AUTO: 167 10*3/MM3 (ref 140–500)
PMV BLD AUTO: 11.3 FL (ref 6–12)
POTASSIUM BLD-SCNC: 3.4 MMOL/L (ref 3.5–5.2)
RBC # BLD AUTO: 3.99 10*6/MM3 (ref 3.9–5.2)
SODIUM BLD-SCNC: 138 MMOL/L (ref 136–145)
WBC NRBC COR # BLD: 12.98 10*3/MM3 (ref 4.5–10.7)

## 2018-06-27 PROCEDURE — 88305 TISSUE EXAM BY PATHOLOGIST: CPT | Performed by: INTERNAL MEDICINE

## 2018-06-27 PROCEDURE — 25010000002 PROPOFOL 10 MG/ML EMULSION: Performed by: NURSE ANESTHETIST, CERTIFIED REGISTERED

## 2018-06-27 PROCEDURE — 85027 COMPLETE CBC AUTOMATED: CPT | Performed by: INTERNAL MEDICINE

## 2018-06-27 PROCEDURE — 99232 SBSQ HOSP IP/OBS MODERATE 35: CPT | Performed by: INTERNAL MEDICINE

## 2018-06-27 PROCEDURE — 0DB98ZZ EXCISION OF DUODENUM, VIA NATURAL OR ARTIFICIAL OPENING ENDOSCOPIC: ICD-10-PCS | Performed by: INTERNAL MEDICINE

## 2018-06-27 PROCEDURE — 0DD58ZX EXTRACTION OF ESOPHAGUS, VIA NATURAL OR ARTIFICIAL OPENING ENDOSCOPIC, DIAGNOSTIC: ICD-10-PCS | Performed by: INTERNAL MEDICINE

## 2018-06-27 PROCEDURE — 25010000002 ONDANSETRON PER 1 MG: Performed by: INTERNAL MEDICINE

## 2018-06-27 PROCEDURE — 76705 ECHO EXAM OF ABDOMEN: CPT

## 2018-06-27 PROCEDURE — 88312 SPECIAL STAINS GROUP 1: CPT | Performed by: INTERNAL MEDICINE

## 2018-06-27 PROCEDURE — 43239 EGD BIOPSY SINGLE/MULTIPLE: CPT | Performed by: INTERNAL MEDICINE

## 2018-06-27 PROCEDURE — 80048 BASIC METABOLIC PNL TOTAL CA: CPT | Performed by: INTERNAL MEDICINE

## 2018-06-27 PROCEDURE — 0DD68ZX EXTRACTION OF STOMACH, VIA NATURAL OR ARTIFICIAL OPENING ENDOSCOPIC, DIAGNOSTIC: ICD-10-PCS | Performed by: INTERNAL MEDICINE

## 2018-06-27 RX ORDER — SUCRALFATE ORAL 1 G/10ML
1 SUSPENSION ORAL EVERY 6 HOURS SCHEDULED
Status: DISCONTINUED | OUTPATIENT
Start: 2018-06-27 | End: 2018-06-28 | Stop reason: HOSPADM

## 2018-06-27 RX ORDER — PROPOFOL 10 MG/ML
VIAL (ML) INTRAVENOUS CONTINUOUS PRN
Status: DISCONTINUED | OUTPATIENT
Start: 2018-06-27 | End: 2018-06-27 | Stop reason: SURG

## 2018-06-27 RX ORDER — SODIUM CHLORIDE, SODIUM LACTATE, POTASSIUM CHLORIDE, CALCIUM CHLORIDE 600; 310; 30; 20 MG/100ML; MG/100ML; MG/100ML; MG/100ML
30 INJECTION, SOLUTION INTRAVENOUS CONTINUOUS
Status: DISCONTINUED | OUTPATIENT
Start: 2018-06-27 | End: 2018-06-28 | Stop reason: HOSPADM

## 2018-06-27 RX ORDER — PANTOPRAZOLE SODIUM 40 MG/1
40 TABLET, DELAYED RELEASE ORAL
Status: DISCONTINUED | OUTPATIENT
Start: 2018-06-27 | End: 2018-06-28 | Stop reason: HOSPADM

## 2018-06-27 RX ADMIN — PROPOFOL 100 MCG/KG/MIN: 10 INJECTION, EMULSION INTRAVENOUS at 13:08

## 2018-06-27 RX ADMIN — ACETAMINOPHEN 650 MG: 325 TABLET, FILM COATED ORAL at 18:19

## 2018-06-27 RX ADMIN — PANTOPRAZOLE SODIUM 40 MG: 40 TABLET, DELAYED RELEASE ORAL at 18:19

## 2018-06-27 RX ADMIN — METOPROLOL TARTRATE 12.5 MG: 25 TABLET ORAL at 08:50

## 2018-06-27 RX ADMIN — SODIUM CHLORIDE, POTASSIUM CHLORIDE, SODIUM LACTATE AND CALCIUM CHLORIDE: 600; 310; 30; 20 INJECTION, SOLUTION INTRAVENOUS at 13:06

## 2018-06-27 RX ADMIN — TRAZODONE HYDROCHLORIDE 100 MG: 100 TABLET, FILM COATED ORAL at 21:51

## 2018-06-27 RX ADMIN — Medication 1 TABLET: at 06:10

## 2018-06-27 RX ADMIN — ACETAMINOPHEN 650 MG: 325 TABLET, FILM COATED ORAL at 08:49

## 2018-06-27 RX ADMIN — ONDANSETRON HYDROCHLORIDE 4 MG: 2 SOLUTION INTRAMUSCULAR; INTRAVENOUS at 05:55

## 2018-06-27 RX ADMIN — FAMOTIDINE 20 MG: 10 INJECTION, SOLUTION INTRAVENOUS at 08:50

## 2018-06-27 RX ADMIN — METOPROLOL TARTRATE 12.5 MG: 25 TABLET ORAL at 21:46

## 2018-06-27 RX ADMIN — CALCIUM CARBONATE-VITAMIN D TAB 500 MG-200 UNIT 500 MG: 500-200 TAB at 21:45

## 2018-06-27 RX ADMIN — SODIUM CHLORIDE, POTASSIUM CHLORIDE, SODIUM LACTATE AND CALCIUM CHLORIDE 30 ML/HR: 600; 310; 30; 20 INJECTION, SOLUTION INTRAVENOUS at 10:57

## 2018-06-27 RX ADMIN — ASPIRIN 81 MG: 81 TABLET, COATED ORAL at 08:50

## 2018-06-27 RX ADMIN — AMITRIPTYLINE HYDROCHLORIDE 12.5 MG: 25 TABLET, FILM COATED ORAL at 21:46

## 2018-06-27 RX ADMIN — LOSARTAN POTASSIUM 25 MG: 25 TABLET, FILM COATED ORAL at 08:50

## 2018-06-27 RX ADMIN — Medication 140 MG: at 08:50

## 2018-06-27 RX ADMIN — ONDANSETRON HYDROCHLORIDE 4 MG: 2 SOLUTION INTRAMUSCULAR; INTRAVENOUS at 13:06

## 2018-06-27 NOTE — ANESTHESIA PREPROCEDURE EVALUATION
Anesthesia Evaluation     Patient summary reviewed and Nursing notes reviewed   no history of anesthetic complications:  NPO Solid Status: > 8 hours  NPO Liquid Status: > 8 hours           Airway   Mallampati: II  TM distance: >3 FB  Neck ROM: full  No difficulty expected  Dental    (+) lower dentures and upper dentures    Pulmonary - normal exam    breath sounds clear to auscultation  (+) a smoker Former,   Cardiovascular - normal exam    Rhythm: regular  Rate: normal    (+) hypertension, past MI ,       Neuro/Psych- negative ROS  GI/Hepatic/Renal/Endo - negative ROS     Musculoskeletal     Abdominal  - normal exam   Substance History - negative use     OB/GYN negative ob/gyn ROS         Other   (+) arthritis                     Anesthesia Plan    ASA 3     MAC     intravenous induction   Anesthetic plan and risks discussed with patient.

## 2018-06-27 NOTE — ANESTHESIA POSTPROCEDURE EVALUATION
"Patient: Lila Pabon    Procedure Summary     Date:  06/27/18 Room / Location:   [facility]    Anesthesia Start:  1306 Anesthesia Stop:  1339    Procedure:  ESOPHAGOGASTRODUODENOSCOPY with biopsies (N/A Esophagus) Diagnosis:       Epigastric pain      Bilious vomiting with nausea      (Epigastric pain [R10.13])      (Bilious vomiting with nausea [R11.14])    Surgeon:  Rupa Hays MD Provider:  Shagufta Richardson MD    Anesthesia Type:  MAC ASA Status:  3          Anesthesia Type: MAC  Last vitals  BP   150/78 (06/27/18 1401)   Temp   36.9 °C (98.4 °F) (06/27/18 1056)   Pulse   78 (06/27/18 1401)   Resp   15 (06/27/18 1401)     SpO2   99 % (06/27/18 1401)     Post Anesthesia Care and Evaluation    Patient participation: complete - patient cannot participate  Anesthetic complications: No anesthetic complications    Cardiovascular status: acceptable  Respiratory status: acceptable    Comments: Patient record reviewed. Patient stable and discharged prior to being evaluated. No apparent anesthetic complications.  THIS CASE IS NOT MEDICALLY DIRECTED.  /78 (BP Location: Left arm, Patient Position: Sitting)   Pulse 78   Temp 36.9 °C (98.4 °F) (Oral)   Resp 15   Ht 167.6 cm (66\")   Wt 57.8 kg (127 lb 8 oz)   SpO2 99%   BMI 20.58 kg/m²       "

## 2018-06-28 VITALS
RESPIRATION RATE: 16 BRPM | SYSTOLIC BLOOD PRESSURE: 154 MMHG | HEART RATE: 80 BPM | DIASTOLIC BLOOD PRESSURE: 94 MMHG | BODY MASS INDEX: 18.9 KG/M2 | TEMPERATURE: 97.9 F | WEIGHT: 117.6 LBS | HEIGHT: 66 IN | OXYGEN SATURATION: 97 %

## 2018-06-28 PROBLEM — I21.4 NSTEMI (NON-ST ELEVATED MYOCARDIAL INFARCTION) (HCC): Status: RESOLVED | Noted: 2018-06-27 | Resolved: 2018-06-28

## 2018-06-28 PROBLEM — K20.90 ESOPHAGITIS: Status: ACTIVE | Noted: 2018-06-28

## 2018-06-28 LAB
CYTO UR: NORMAL
LAB AP CASE REPORT: NORMAL
PATH REPORT.FINAL DX SPEC: NORMAL
PATH REPORT.GROSS SPEC: NORMAL

## 2018-06-28 PROCEDURE — 99239 HOSP IP/OBS DSCHRG MGMT >30: CPT | Performed by: INTERNAL MEDICINE

## 2018-06-28 RX ORDER — PANTOPRAZOLE SODIUM 40 MG/1
40 TABLET, DELAYED RELEASE ORAL
Qty: 60 TABLET | Refills: 1 | Status: SHIPPED | OUTPATIENT
Start: 2018-06-28 | End: 2018-08-14 | Stop reason: SDUPTHER

## 2018-06-28 RX ORDER — SUCRALFATE ORAL 1 G/10ML
1 SUSPENSION ORAL EVERY 6 HOURS SCHEDULED
Qty: 1200 ML | Refills: 0 | Status: SHIPPED | OUTPATIENT
Start: 2018-06-28 | End: 2018-07-28

## 2018-06-28 RX ADMIN — METOPROLOL TARTRATE 12.5 MG: 25 TABLET ORAL at 08:53

## 2018-06-28 RX ADMIN — SUCRALFATE 1 G: 1 SUSPENSION ORAL at 07:19

## 2018-06-28 RX ADMIN — ASPIRIN 81 MG: 81 TABLET, COATED ORAL at 08:53

## 2018-06-28 RX ADMIN — ACETAMINOPHEN 650 MG: 325 TABLET, FILM COATED ORAL at 04:09

## 2018-06-28 RX ADMIN — Medication 140 MG: at 08:53

## 2018-06-28 RX ADMIN — PANTOPRAZOLE SODIUM 40 MG: 40 TABLET, DELAYED RELEASE ORAL at 07:19

## 2018-06-28 RX ADMIN — SUCRALFATE 1 G: 1 SUSPENSION ORAL at 00:59

## 2018-06-28 RX ADMIN — LOSARTAN POTASSIUM 25 MG: 25 TABLET, FILM COATED ORAL at 08:53

## 2018-06-29 DIAGNOSIS — K20.90 ESOPHAGITIS: Primary | ICD-10-CM

## 2018-06-29 RX ORDER — SODIUM CHLORIDE, SODIUM LACTATE, POTASSIUM CHLORIDE, CALCIUM CHLORIDE 600; 310; 30; 20 MG/100ML; MG/100ML; MG/100ML; MG/100ML
30 INJECTION, SOLUTION INTRAVENOUS CONTINUOUS
Status: CANCELLED | OUTPATIENT
Start: 2018-09-18

## 2018-07-25 RX ORDER — SUCRALFATE 1 G/10ML
SUSPENSION ORAL
Refills: 0 | OUTPATIENT
Start: 2018-07-25

## 2018-08-14 ENCOUNTER — TELEPHONE (OUTPATIENT)
Dept: GASTROENTEROLOGY | Facility: CLINIC | Age: 63
End: 2018-08-14

## 2018-08-14 RX ORDER — PANTOPRAZOLE SODIUM 40 MG/1
40 TABLET, DELAYED RELEASE ORAL
Qty: 60 TABLET | Refills: 0 | Status: ON HOLD | OUTPATIENT
Start: 2018-08-14 | End: 2018-09-18

## 2018-08-14 NOTE — TELEPHONE ENCOUNTER
Call to pt.  Advise per Dr Hays that EGD bx consistent with esophagitis.  Gastric polyp was benign hyperplastic polyp.  No H pylori infection.    Continue twice daily pantoprazole for 3 mo's.    Will need repeat EGD at that time to assess for healing of esophagus.    Pt verb understanding.  Repeat EGD scheduled for 9/18.    States needs refill of pantoprazole.  Danielle completed, #60, R0.

## 2018-08-14 NOTE — TELEPHONE ENCOUNTER
----- Message from Rupa Hays MD sent at 6/29/2018  5:24 PM EDT -----  EGD biopsies were consistent with esophagitis.  The gastric polyp was a benign hyperplastic polyp.  No H. pylori infection.    Continue twice-daily pantoprazole for 3 months.    We'll need repeat EGD at that time to assess for healing of the esophagus-- case request entered

## 2018-08-23 RX ORDER — PANTOPRAZOLE SODIUM 40 MG/1
TABLET, DELAYED RELEASE ORAL
Qty: 60 TABLET | Refills: 0 | OUTPATIENT
Start: 2018-08-23

## 2018-09-12 RX ORDER — PANTOPRAZOLE SODIUM 40 MG/1
TABLET, DELAYED RELEASE ORAL
Qty: 60 TABLET | Refills: 0 | OUTPATIENT
Start: 2018-09-12

## 2018-09-18 ENCOUNTER — HOSPITAL ENCOUNTER (OUTPATIENT)
Facility: HOSPITAL | Age: 63
Setting detail: HOSPITAL OUTPATIENT SURGERY
Discharge: HOME | End: 2018-09-18
Attending: INTERNAL MEDICINE | Admitting: INTERNAL MEDICINE

## 2018-09-18 ENCOUNTER — ANESTHESIA (OUTPATIENT)
Dept: GASTROENTEROLOGY | Facility: HOSPITAL | Age: 63
End: 2018-09-18

## 2018-09-18 ENCOUNTER — ANESTHESIA EVENT (OUTPATIENT)
Dept: GASTROENTEROLOGY | Facility: HOSPITAL | Age: 63
End: 2018-09-18

## 2018-09-18 VITALS
BODY MASS INDEX: 21.34 KG/M2 | HEART RATE: 46 BPM | SYSTOLIC BLOOD PRESSURE: 112 MMHG | OXYGEN SATURATION: 99 % | RESPIRATION RATE: 16 BRPM | TEMPERATURE: 98.1 F | WEIGHT: 128.06 LBS | HEIGHT: 65 IN | DIASTOLIC BLOOD PRESSURE: 69 MMHG

## 2018-09-18 DIAGNOSIS — K20.90 ESOPHAGITIS: ICD-10-CM

## 2018-09-18 PROCEDURE — 25010000002 PROPOFOL 10 MG/ML EMULSION: Performed by: ANESTHESIOLOGY

## 2018-09-18 PROCEDURE — 43239 EGD BIOPSY SINGLE/MULTIPLE: CPT | Performed by: INTERNAL MEDICINE

## 2018-09-18 PROCEDURE — 25010000002 MIDAZOLAM PER 1 MG: Performed by: ANESTHESIOLOGY

## 2018-09-18 PROCEDURE — S0260 H&P FOR SURGERY: HCPCS | Performed by: INTERNAL MEDICINE

## 2018-09-18 PROCEDURE — 88305 TISSUE EXAM BY PATHOLOGIST: CPT | Performed by: INTERNAL MEDICINE

## 2018-09-18 PROCEDURE — 88312 SPECIAL STAINS GROUP 1: CPT | Performed by: INTERNAL MEDICINE

## 2018-09-18 RX ORDER — SODIUM CHLORIDE, SODIUM LACTATE, POTASSIUM CHLORIDE, CALCIUM CHLORIDE 600; 310; 30; 20 MG/100ML; MG/100ML; MG/100ML; MG/100ML
30 INJECTION, SOLUTION INTRAVENOUS CONTINUOUS
Status: DISCONTINUED | OUTPATIENT
Start: 2018-09-18 | End: 2018-09-18 | Stop reason: HOSPADM

## 2018-09-18 RX ORDER — LIDOCAINE HYDROCHLORIDE 20 MG/ML
INJECTION, SOLUTION INFILTRATION; PERINEURAL AS NEEDED
Status: DISCONTINUED | OUTPATIENT
Start: 2018-09-18 | End: 2018-09-18 | Stop reason: SURG

## 2018-09-18 RX ORDER — PANTOPRAZOLE SODIUM 40 MG/1
40 TABLET, DELAYED RELEASE ORAL
Qty: 60 TABLET | Refills: 0 | Status: SHIPPED | OUTPATIENT
Start: 2018-09-18 | End: 2018-10-31 | Stop reason: DRUGHIGH

## 2018-09-18 RX ORDER — PROPOFOL 10 MG/ML
VIAL (ML) INTRAVENOUS AS NEEDED
Status: DISCONTINUED | OUTPATIENT
Start: 2018-09-18 | End: 2018-09-18 | Stop reason: SURG

## 2018-09-18 RX ORDER — SODIUM CHLORIDE 0.9 % (FLUSH) 0.9 %
3 SYRINGE (ML) INJECTION AS NEEDED
Status: DISCONTINUED | OUTPATIENT
Start: 2018-09-18 | End: 2018-09-18 | Stop reason: HOSPADM

## 2018-09-18 RX ORDER — FLUTICASONE PROPIONATE 50 MCG
2 SPRAY, SUSPENSION (ML) NASAL NIGHTLY
COMMUNITY

## 2018-09-18 RX ORDER — FLUCONAZOLE 100 MG/1
100 TABLET ORAL DAILY
Qty: 14 TABLET | Refills: 0 | Status: SHIPPED | OUTPATIENT
Start: 2018-09-18 | End: 2018-10-02

## 2018-09-18 RX ORDER — MIDAZOLAM HYDROCHLORIDE 1 MG/ML
INJECTION INTRAMUSCULAR; INTRAVENOUS AS NEEDED
Status: DISCONTINUED | OUTPATIENT
Start: 2018-09-18 | End: 2018-09-18 | Stop reason: SURG

## 2018-09-18 RX ORDER — PROPOFOL 10 MG/ML
VIAL (ML) INTRAVENOUS CONTINUOUS PRN
Status: DISCONTINUED | OUTPATIENT
Start: 2018-09-18 | End: 2018-09-18 | Stop reason: SURG

## 2018-09-18 RX ADMIN — SODIUM CHLORIDE, POTASSIUM CHLORIDE, SODIUM LACTATE AND CALCIUM CHLORIDE 30 ML/HR: 600; 310; 30; 20 INJECTION, SOLUTION INTRAVENOUS at 07:46

## 2018-09-18 RX ADMIN — PROPOFOL 100 MCG/KG/MIN: 10 INJECTION, EMULSION INTRAVENOUS at 08:11

## 2018-09-18 RX ADMIN — MIDAZOLAM 1 MG: 1 INJECTION INTRAMUSCULAR; INTRAVENOUS at 08:10

## 2018-09-18 RX ADMIN — LIDOCAINE HYDROCHLORIDE 60 MG: 20 INJECTION, SOLUTION INFILTRATION; PERINEURAL at 08:11

## 2018-09-18 RX ADMIN — PROPOFOL 100 MG: 10 INJECTION, EMULSION INTRAVENOUS at 08:10

## 2018-09-18 RX ADMIN — ALFENTANIL HYDROCHLORIDE 500 MCG: 500 INJECTION, SOLUTION INTRAVENOUS at 08:10

## 2018-09-18 NOTE — H&P
[facility]  Pre Procedure History & Physical    Chief Complaint: erosive esophagitis      HPI: 62 yo W with PMH as below here for EGD to assess healing of her severe erosive esophagitis.  She was hospitalized in June for chest pain with a negative cardiac workup.  EGD on 6/27/18 showed LA-D esophagitis.  She has been on twice daily ppi    Past Medical History:   Past Medical History:   Diagnosis Date   • Anemia     IRON SUPPLEMENTS   • Arthritis    • GERD (gastroesophageal reflux disease)    • History of cardiac cath    • Hypertension    • Non-STEMI (non-ST elevated myocardial infarction) (CMS/Formerly [facility]) 06/25/2018       Family History:  History reviewed. No pertinent family history.    Social History:   reports that she quit smoking about 7 years ago. Her smoking use included Cigarettes. She quit after 44.00 years of use. She has never used smokeless tobacco. She reports that she drinks alcohol. She reports that she does not use drugs.    Medications:   Prescriptions Prior to Admission   Medication Sig Dispense Refill Last Dose   • amitriptyline (ELAVIL) 25 MG tablet Take 12.5 mg by mouth Every Night.   9/17/2018 at Unknown time   • calcium carb-cholecalciferol (CALCIUM 600+D) 600-800 MG-UNIT tablet Take 1 tablet by mouth Every Night.   9/17/2018 at Unknown time   • estrogens, conjugated, (PREMARIN) 0.45 MG tablet Take 0.45 mg by mouth Every Night. Take daily for 21 days then do not take for 7 days.    9/17/2018 at Unknown time   • ferrous sulfate 140 (45 Fe) MG tablet controlled-release tablet Take 140 mg by mouth Daily With Breakfast.   9/17/2018 at Unknown time   • fluticasone (FLONASE) 50 MCG/ACT nasal spray 2 sprays into the nostril(s) as directed by provider As Needed for Rhinitis.   9/17/2018 at Unknown time   • losartan (COZAAR) 25 MG tablet Take 25 mg by mouth Daily.   9/17/2018 at Unknown time   • pantoprazole (PROTONIX) 40 MG EC tablet Take 1 tablet by mouth 2 (Two) Times a Day Before  Meals. 60 tablet 0 9/17/2018 at Unknown time   • traZODone (DESYREL) 100 MG tablet Take 100 mg by mouth Every Night.   9/17/2018 at Unknown time       Allergies:  Mobic [meloxicam]    ROS:    Pertinent items are noted in HPI     Objective     There were no vitals taken for this visit.    Physical Exam   Constitutional: Pt is oriented to person, place, and time and well-developed, well-nourished, and in no distress.   HENT:   Mouth/Throat: Oropharynx is clear and moist.   Neck: Normal range of motion. Neck supple.   Cardiovascular: Normal rate, regular rhythm and normal heart sounds.    Pulmonary/Chest: Effort normal and breath sounds normal. No respiratory distress. No  wheezes.   Abdominal: Soft. Bowel sounds are normal.   Skin: Skin is warm and dry.   Psychiatric: Mood, memory, affect and judgment normal.     Assessment/Plan     Diagnosis: erosive esophagitis      Anticipated Surgical Procedure:  EGD      The risks, benefits, and alternatives of this procedure have been discussed with the patient or the responsible party- the patient understands and agrees to proceed.

## 2018-09-18 NOTE — ANESTHESIA PREPROCEDURE EVALUATION
Anesthesia Evaluation     Patient summary reviewed and Nursing notes reviewed   no history of anesthetic complications:  NPO Solid Status: > 8 hours  NPO Liquid Status: > 8 hours           Airway   Mallampati: II  Dental      Pulmonary - negative pulmonary ROS and normal exam   Cardiovascular - normal exam    (+) hypertension less than 2 medications, past MI  >12 months,       Neuro/Psych- negative ROS  GI/Hepatic/Renal/Endo    (+)  GERD,      Musculoskeletal     Abdominal    Substance History      OB/GYN          Other                        Anesthesia Plan    ASA 3     MAC     intravenous induction   Anesthetic plan, all risks, benefits, and alternatives have been provided, discussed and informed consent has been obtained with: patient.

## 2018-09-18 NOTE — ANESTHESIA POSTPROCEDURE EVALUATION
"Patient: Lila Pabon    Procedure Summary     Date:  09/18/18 Room / Location:  [facility] /  [facility]    Anesthesia Start:  0805 Anesthesia Stop:  0828    Procedure:  ESOPHAGOGASTRODUODENOSCOPY with biopsies (N/A Esophagus) Diagnosis:       Esophagitis      (Esophagitis [K20.9])    Surgeon:  Rupa Hays MD Provider:  Biju Cooper MD    Anesthesia Type:  MAC ASA Status:  3          Anesthesia Type: MAC  Last vitals  BP   95/60 (09/18/18 0836)   Temp   36.7 °C (98.1 °F) (09/18/18 0836)   Pulse   (!) 46 (09/18/18 0836)   Resp   16 (09/18/18 0730)     SpO2   98 % (09/18/18 0836)     Post Anesthesia Care and Evaluation    Patient location during evaluation: bedside  Patient participation: complete - patient participated  Level of consciousness: awake and alert  Pain management: adequate  Airway patency: patent  Anesthetic complications: No anesthetic complications    Cardiovascular status: acceptable  Respiratory status: acceptable  Hydration status: acceptable    Comments: BP 95/60 (BP Location: Left arm, Patient Position: Lying)   Pulse (!) 46   Temp 36.7 °C (98.1 °F) (Oral)   Resp 16   Ht 163.8 cm (64.5\")   Wt 58.1 kg (128 lb 1 oz)   SpO2 98%   BMI 21.64 kg/m²       "

## 2018-09-20 NOTE — PROGRESS NOTES
EGD show healed esophagitis, normal small bowel and stomach    She does have candida esophagitis.  She needs complete the fluconazole that I ordered for her.    Follow-up in office in about 5-6 months.

## 2018-10-31 ENCOUNTER — TELEPHONE (OUTPATIENT)
Dept: GASTROENTEROLOGY | Facility: CLINIC | Age: 63
End: 2018-10-31

## 2018-10-31 RX ORDER — PANTOPRAZOLE SODIUM 20 MG/1
TABLET, DELAYED RELEASE ORAL
Qty: 60 TABLET | Refills: 0 | Status: SHIPPED | OUTPATIENT
Start: 2018-10-31 | End: 2018-12-10 | Stop reason: HOSPADM

## 2018-10-31 NOTE — TELEPHONE ENCOUNTER
----- Message from Swapna Pierce sent at 10/31/2018 10:34 AM EDT -----  Regarding: Pt called   Contact: [phone]  Pt is calling about her medication pantoprazole (PROTONIX) 40 MG EC tablet, says she is running out but is she suppose to stay on the medication or stop it.

## 2018-11-02 ENCOUNTER — TRANSCRIBE ORDERS (OUTPATIENT)
Dept: ADMINISTRATIVE | Facility: HOSPITAL | Age: 63
End: 2018-11-02

## 2018-11-02 DIAGNOSIS — Z12.39 SCREENING BREAST EXAMINATION: Primary | ICD-10-CM

## 2018-11-14 RX ORDER — PANTOPRAZOLE SODIUM 40 MG/1
TABLET, DELAYED RELEASE ORAL
Qty: 60 TABLET | Refills: 0 | OUTPATIENT
Start: 2018-11-14

## 2018-11-15 ENCOUNTER — HOSPITAL ENCOUNTER (OUTPATIENT)
Dept: MAMMOGRAPHY | Facility: HOSPITAL | Age: 63
Discharge: HOME | End: 2018-11-15
Attending: FAMILY MEDICINE | Admitting: FAMILY MEDICINE

## 2018-11-15 DIAGNOSIS — Z12.39 SCREENING BREAST EXAMINATION: ICD-10-CM

## 2018-11-15 PROCEDURE — 77067 SCR MAMMO BI INCL CAD: CPT

## 2018-11-15 PROCEDURE — 77063 BREAST TOMOSYNTHESIS BI: CPT

## 2018-12-04 ENCOUNTER — APPOINTMENT (OUTPATIENT)
Dept: CARDIOLOGY | Facility: HOSPITAL | Age: 63
End: 2018-12-04
Attending: INTERNAL MEDICINE

## 2018-12-04 ENCOUNTER — HOSPITAL ENCOUNTER (INPATIENT)
Facility: HOSPITAL | Age: 63
LOS: 5 days | Discharge: ANOTHER HOSPITAL | End: 2018-12-09
Attending: EMERGENCY MEDICINE | Admitting: FAMILY MEDICINE

## 2018-12-04 DIAGNOSIS — R77.8 ELEVATED TROPONIN: ICD-10-CM

## 2018-12-04 DIAGNOSIS — R11.14 BILIOUS VOMITING WITH NAUSEA: ICD-10-CM

## 2018-12-04 DIAGNOSIS — K29.00 ACUTE GASTRITIS WITHOUT HEMORRHAGE, UNSPECIFIED GASTRITIS TYPE: ICD-10-CM

## 2018-12-04 DIAGNOSIS — R10.13 EPIGASTRIC PAIN: Primary | ICD-10-CM

## 2018-12-04 DIAGNOSIS — K20.90 ESOPHAGITIS: ICD-10-CM

## 2018-12-04 DIAGNOSIS — E87.20 LACTIC ACID INCREASED: ICD-10-CM

## 2018-12-04 LAB
ALBUMIN SERPL-MCNC: 4.5 G/DL (ref 3.5–5.2)
ALBUMIN/GLOB SERPL: 1.6 G/DL
ALP SERPL-CCNC: 67 U/L (ref 40–129)
ALT SERPL W P-5'-P-CCNC: 40 U/L (ref 5–33)
AMORPH URATE CRY URNS QL MICRO: ABNORMAL /HPF
ANION GAP SERPL CALCULATED.3IONS-SCNC: 19.5 MMOL/L
AST SERPL-CCNC: 46 U/L (ref 5–32)
BACTERIA UR QL AUTO: ABNORMAL /HPF
BASOPHILS # BLD AUTO: 0.06 10*3/MM3 (ref 0–0.2)
BASOPHILS NFR BLD AUTO: 0.4 % (ref 0–2)
BILIRUB SERPL-MCNC: 0.9 MG/DL (ref 0.2–1.2)
BILIRUB UR QL STRIP: NEGATIVE
BUN BLD-MCNC: 16 MG/DL (ref 8–23)
BUN/CREAT SERPL: 14.7 (ref 7–25)
CALCIUM SPEC-SCNC: 9.7 MG/DL (ref 8.8–10.5)
CHLORIDE SERPL-SCNC: 96 MMOL/L (ref 98–107)
CLARITY UR: ABNORMAL
CO2 SERPL-SCNC: 22.5 MMOL/L (ref 22–29)
COLOR UR: YELLOW
CREAT BLD-MCNC: 1.09 MG/DL (ref 0.57–1)
D-LACTATE SERPL-SCNC: 0.8 MMOL/L (ref 0.5–2)
D-LACTATE SERPL-SCNC: 3.4 MMOL/L (ref 0.5–2)
DEPRECATED RDW RBC AUTO: 42.9 FL (ref 37–54)
EOSINOPHIL # BLD AUTO: 0.03 10*3/MM3 (ref 0.1–0.3)
EOSINOPHIL NFR BLD AUTO: 0.2 % (ref 0–4)
ERYTHROCYTE [DISTWIDTH] IN BLOOD BY AUTOMATED COUNT: 13.2 % (ref 11.5–14.5)
GFR SERPL CREATININE-BSD FRML MDRD: 50 ML/MIN/1.73
GLOBULIN UR ELPH-MCNC: 2.9 GM/DL
GLUCOSE BLD-MCNC: 141 MG/DL (ref 65–99)
GLUCOSE UR STRIP-MCNC: NEGATIVE MG/DL
HCT VFR BLD AUTO: 36.7 % (ref 37–47)
HGB BLD-MCNC: 12.7 G/DL (ref 12–16)
HGB UR QL STRIP.AUTO: ABNORMAL
HOLD SPECIMEN: NORMAL
HOLD SPECIMEN: NORMAL
HYALINE CASTS UR QL AUTO: ABNORMAL /LPF
IMM GRANULOCYTES # BLD: 0.06 10*3/MM3 (ref 0–0.03)
IMM GRANULOCYTES NFR BLD: 0.4 % (ref 0–0.5)
KETONES UR QL STRIP: ABNORMAL
LEUKOCYTE ESTERASE UR QL STRIP.AUTO: NEGATIVE
LIPASE SERPL-CCNC: 75 U/L (ref 13–60)
LYMPHOCYTES # BLD AUTO: 1.38 10*3/MM3 (ref 0.6–4.8)
LYMPHOCYTES NFR BLD AUTO: 10.1 % (ref 20–45)
MCH RBC QN AUTO: 30.4 PG (ref 27–31)
MCHC RBC AUTO-ENTMCNC: 34.6 G/DL (ref 31–37)
MCV RBC AUTO: 87.8 FL (ref 81–99)
MONOCYTES # BLD AUTO: 1.32 10*3/MM3 (ref 0–1)
MONOCYTES NFR BLD AUTO: 9.7 % (ref 3–8)
NEUTROPHILS # BLD AUTO: 10.79 10*3/MM3 (ref 1.5–8.3)
NEUTROPHILS NFR BLD AUTO: 79.2 % (ref 45–70)
NITRITE UR QL STRIP: NEGATIVE
NRBC BLD MANUAL-RTO: 0 /100 WBC (ref 0–0)
PH UR STRIP.AUTO: 8.5 [PH] (ref 4.5–8)
PLATELET # BLD AUTO: 199 10*3/MM3 (ref 140–500)
PMV BLD AUTO: 10.9 FL (ref 7.4–10.4)
POTASSIUM BLD-SCNC: 3.8 MMOL/L (ref 3.5–5.2)
PROT SERPL-MCNC: 7.4 G/DL (ref 6–8.5)
PROT UR QL STRIP: ABNORMAL
RBC # BLD AUTO: 4.18 10*6/MM3 (ref 4.2–5.4)
RBC # UR: ABNORMAL /HPF
REF LAB TEST METHOD: ABNORMAL
SODIUM BLD-SCNC: 138 MMOL/L (ref 136–145)
SP GR UR STRIP: 1.02 (ref 1–1.03)
SQUAMOUS #/AREA URNS HPF: ABNORMAL /HPF
TROPONIN T SERPL-MCNC: 0.1 NG/ML (ref 0–0.03)
TROPONIN T SERPL-MCNC: 0.14 NG/ML (ref 0–0.03)
UROBILINOGEN UR QL STRIP: ABNORMAL
WBC NRBC COR # BLD: 13.64 10*3/MM3 (ref 4.8–10.8)
WBC UR QL AUTO: ABNORMAL /HPF
WHOLE BLOOD HOLD SPECIMEN: NORMAL

## 2018-12-04 PROCEDURE — 99291 CRITICAL CARE FIRST HOUR: CPT | Performed by: EMERGENCY MEDICINE

## 2018-12-04 PROCEDURE — 83690 ASSAY OF LIPASE: CPT | Performed by: EMERGENCY MEDICINE

## 2018-12-04 PROCEDURE — 25010000002 HYDROMORPHONE PER 4 MG: Performed by: EMERGENCY MEDICINE

## 2018-12-04 PROCEDURE — 93005 ELECTROCARDIOGRAM TRACING: CPT | Performed by: EMERGENCY MEDICINE

## 2018-12-04 PROCEDURE — 93005 ELECTROCARDIOGRAM TRACING: CPT | Performed by: INTERNAL MEDICINE

## 2018-12-04 PROCEDURE — 81001 URINALYSIS AUTO W/SCOPE: CPT | Performed by: EMERGENCY MEDICINE

## 2018-12-04 PROCEDURE — 93010 ELECTROCARDIOGRAM REPORT: CPT | Performed by: INTERNAL MEDICINE

## 2018-12-04 PROCEDURE — 83605 ASSAY OF LACTIC ACID: CPT | Performed by: EMERGENCY MEDICINE

## 2018-12-04 PROCEDURE — 25010000002 ONDANSETRON PER 1 MG: Performed by: FAMILY MEDICINE

## 2018-12-04 PROCEDURE — 80053 COMPREHEN METABOLIC PANEL: CPT | Performed by: EMERGENCY MEDICINE

## 2018-12-04 PROCEDURE — 25010000002 HYDROMORPHONE PER 4 MG: Performed by: FAMILY MEDICINE

## 2018-12-04 PROCEDURE — 25010000002 ONDANSETRON PER 1 MG: Performed by: EMERGENCY MEDICINE

## 2018-12-04 PROCEDURE — 84484 ASSAY OF TROPONIN QUANT: CPT | Performed by: EMERGENCY MEDICINE

## 2018-12-04 PROCEDURE — 99222 1ST HOSP IP/OBS MODERATE 55: CPT | Performed by: INTERNAL MEDICINE

## 2018-12-04 PROCEDURE — 85025 COMPLETE CBC W/AUTO DIFF WBC: CPT | Performed by: EMERGENCY MEDICINE

## 2018-12-04 PROCEDURE — 84484 ASSAY OF TROPONIN QUANT: CPT | Performed by: FAMILY MEDICINE

## 2018-12-04 PROCEDURE — 99284 EMERGENCY DEPT VISIT MOD MDM: CPT

## 2018-12-04 PROCEDURE — 93005 ELECTROCARDIOGRAM TRACING: CPT | Performed by: FAMILY MEDICINE

## 2018-12-04 RX ORDER — SODIUM CHLORIDE 0.9 % (FLUSH) 0.9 %
3 SYRINGE (ML) INJECTION EVERY 12 HOURS SCHEDULED
Status: DISCONTINUED | OUTPATIENT
Start: 2018-12-04 | End: 2018-12-09 | Stop reason: HOSPADM

## 2018-12-04 RX ORDER — ONDANSETRON 2 MG/ML
4 INJECTION INTRAMUSCULAR; INTRAVENOUS EVERY 8 HOURS PRN
Status: DISCONTINUED | OUTPATIENT
Start: 2018-12-04 | End: 2018-12-09 | Stop reason: HOSPADM

## 2018-12-04 RX ORDER — ASPIRIN 325 MG
TABLET ORAL
Status: COMPLETED
Start: 2018-12-04 | End: 2018-12-04

## 2018-12-04 RX ORDER — SUCRALFATE 1 G/1
1 TABLET ORAL
Status: DISCONTINUED | OUTPATIENT
Start: 2018-12-04 | End: 2018-12-09 | Stop reason: HOSPADM

## 2018-12-04 RX ORDER — CALCIUM CARBONATE 200(500)MG
1 TABLET,CHEWABLE ORAL 2 TIMES DAILY PRN
Status: DISCONTINUED | OUTPATIENT
Start: 2018-12-04 | End: 2018-12-09 | Stop reason: HOSPADM

## 2018-12-04 RX ORDER — SODIUM CHLORIDE 9 MG/ML
40 INJECTION, SOLUTION INTRAVENOUS AS NEEDED
Status: DISCONTINUED | OUTPATIENT
Start: 2018-12-04 | End: 2018-12-09 | Stop reason: HOSPADM

## 2018-12-04 RX ORDER — BISACODYL 5 MG/1
5 TABLET, DELAYED RELEASE ORAL DAILY PRN
Status: DISCONTINUED | OUTPATIENT
Start: 2018-12-04 | End: 2018-12-09 | Stop reason: HOSPADM

## 2018-12-04 RX ORDER — ACETAMINOPHEN 325 MG/1
650 TABLET ORAL EVERY 4 HOURS PRN
Status: DISCONTINUED | OUTPATIENT
Start: 2018-12-04 | End: 2018-12-09 | Stop reason: HOSPADM

## 2018-12-04 RX ORDER — ASPIRIN 325 MG
325 TABLET ORAL ONCE
Status: COMPLETED | OUTPATIENT
Start: 2018-12-04 | End: 2018-12-04

## 2018-12-04 RX ORDER — SODIUM CHLORIDE 9 MG/ML
75 INJECTION, SOLUTION INTRAVENOUS CONTINUOUS
Status: DISCONTINUED | OUTPATIENT
Start: 2018-12-04 | End: 2018-12-05

## 2018-12-04 RX ORDER — SODIUM CHLORIDE 0.9 % (FLUSH) 0.9 %
10 SYRINGE (ML) INJECTION AS NEEDED
Status: DISCONTINUED | OUTPATIENT
Start: 2018-12-04 | End: 2018-12-09 | Stop reason: HOSPADM

## 2018-12-04 RX ORDER — SODIUM CHLORIDE 0.9 % (FLUSH) 0.9 %
1-10 SYRINGE (ML) INJECTION AS NEEDED
Status: DISCONTINUED | OUTPATIENT
Start: 2018-12-04 | End: 2018-12-09 | Stop reason: HOSPADM

## 2018-12-04 RX ORDER — ONDANSETRON 2 MG/ML
4 INJECTION INTRAMUSCULAR; INTRAVENOUS ONCE
Status: COMPLETED | OUTPATIENT
Start: 2018-12-04 | End: 2018-12-04

## 2018-12-04 RX ORDER — PANTOPRAZOLE SODIUM 40 MG/10ML
80 INJECTION, POWDER, LYOPHILIZED, FOR SOLUTION INTRAVENOUS ONCE
Status: COMPLETED | OUTPATIENT
Start: 2018-12-04 | End: 2018-12-04

## 2018-12-04 RX ORDER — HYDROMORPHONE HCL 110MG/55ML
0.5 PATIENT CONTROLLED ANALGESIA SYRINGE INTRAVENOUS
Status: DISCONTINUED | OUTPATIENT
Start: 2018-12-04 | End: 2018-12-05

## 2018-12-04 RX ORDER — ONDANSETRON 2 MG/ML
4 INJECTION INTRAMUSCULAR; INTRAVENOUS EVERY 6 HOURS PRN
Status: DISCONTINUED | OUTPATIENT
Start: 2018-12-04 | End: 2018-12-09 | Stop reason: HOSPADM

## 2018-12-04 RX ORDER — HYDROMORPHONE HCL 110MG/55ML
1 PATIENT CONTROLLED ANALGESIA SYRINGE INTRAVENOUS ONCE
Status: COMPLETED | OUTPATIENT
Start: 2018-12-04 | End: 2018-12-04

## 2018-12-04 RX ADMIN — ASPIRIN 325 MG: 325 TABLET ORAL at 04:08

## 2018-12-04 RX ADMIN — Medication 325 MG: at 04:08

## 2018-12-04 RX ADMIN — HYDROMORPHONE HYDROCHLORIDE 0.5 MG: 2 INJECTION, SOLUTION INTRAMUSCULAR; INTRAVENOUS; SUBCUTANEOUS at 07:47

## 2018-12-04 RX ADMIN — SODIUM CHLORIDE 8 MG/HR: 900 INJECTION INTRAVENOUS at 08:09

## 2018-12-04 RX ADMIN — SUCRALFATE 1 G: 1 TABLET ORAL at 11:16

## 2018-12-04 RX ADMIN — SODIUM CHLORIDE 1000 ML: 9 INJECTION, SOLUTION INTRAVENOUS at 02:44

## 2018-12-04 RX ADMIN — SUCRALFATE 1 G: 1 TABLET ORAL at 18:43

## 2018-12-04 RX ADMIN — PANTOPRAZOLE SODIUM 80 MG: 40 INJECTION, POWDER, FOR SOLUTION INTRAVENOUS at 03:04

## 2018-12-04 RX ADMIN — SUCRALFATE 1 G: 1 TABLET ORAL at 21:47

## 2018-12-04 RX ADMIN — SODIUM CHLORIDE 8 MG/HR: 900 INJECTION INTRAVENOUS at 19:02

## 2018-12-04 RX ADMIN — SODIUM CHLORIDE 75 ML/HR: 9 INJECTION, SOLUTION INTRAVENOUS at 05:42

## 2018-12-04 RX ADMIN — SODIUM CHLORIDE 75 ML/HR: 9 INJECTION, SOLUTION INTRAVENOUS at 18:44

## 2018-12-04 RX ADMIN — SODIUM CHLORIDE, PRESERVATIVE FREE 10 ML: 5 INJECTION INTRAVENOUS at 07:49

## 2018-12-04 RX ADMIN — ONDANSETRON 4 MG: 2 SOLUTION INTRAMUSCULAR; INTRAVENOUS at 20:59

## 2018-12-04 RX ADMIN — HYDROMORPHONE HYDROCHLORIDE 1 MG: 2 INJECTION INTRAMUSCULAR; INTRAVENOUS; SUBCUTANEOUS at 03:04

## 2018-12-04 RX ADMIN — HYDROMORPHONE HYDROCHLORIDE 0.5 MG: 2 INJECTION, SOLUTION INTRAMUSCULAR; INTRAVENOUS; SUBCUTANEOUS at 19:40

## 2018-12-04 RX ADMIN — Medication 10 ML: at 03:22

## 2018-12-04 RX ADMIN — ONDANSETRON 4 MG: 2 SOLUTION INTRAMUSCULAR; INTRAVENOUS at 02:34

## 2018-12-04 NOTE — ED NOTES
Dr. Dunbar called back and spoke to Dr. Alva regarding the patient.     Jessie Miller  12/04/18 0335

## 2018-12-04 NOTE — NURSING NOTE
Attempted to wean Pt off of 02. Took Pt down to 1L N/C and was holding SATS at 90% most of afternoon.     Removed Pt from 02 at 1830; Pt SATS dropped to 85%; Placed Pt back on 02 at 1L N/C, Pt SATS at 90%    All SATS noted are at rest. Will continue to monitor

## 2018-12-04 NOTE — PLAN OF CARE
Problem: Patient Care Overview  Goal: Plan of Care Review  Outcome: Ongoing (interventions implemented as appropriate)   12/04/18 1522   OTHER   Outcome Summary GI Cslt-Pending assessment; LCC CsltEcho Pending; NPO, sips w/ meds

## 2018-12-04 NOTE — H&P
HISTORY AND PHYSICAL      Patient Care Team:  Jaskaran Zhang MD as PCP - General (Family Medicine)    CHIEF COMPLAINT: Abdominal pain, nausea, vomiting    HISTORY OF PRESENT ILLNESS:    63-year-old white female well-known to me presented emergency room with a 24-hour history of severe upper abdominal pain associated with intractable vomiting.  Patient states she was in her usual state of health when she woke up early in the morning of  complaints of severe upper abdominal pain nausea and intractable vomiting throughout the morning .  She felt better  afternoon and did some 7-Up and some soup but the pain started back up Monday night and therefore she came to emergency room.  She denies any hematemesis or melena.  She complains of right upper quadrant and epigastric substernal burning chest pain.  There is no radiation.  Patient had a similar episode in  of this year and was transferred to [facility] where she had endoscopy which showed stage IV versus esophagitis.  She had a type II myocardial infarction at that time.  Her coronary arteries normal coronary arteriography.  She had a repeat endoscopy in 2018 which showed some Candida esophagitis and her PPI was decreased from 40 mg twice a day to 20mg daily.      Past medical history  Erosive esophagitis  Osteoporosis  Hypertension  Chronic insomnia    Past Surgical History:   Procedure Laterality Date   • CATARACT EXTRACTION Bilateral    • CATARACT EXTRACTION     • GLAUCOMA SURGERY     • HYSTERECTOMY     • SHOULDER SURGERY Right     RUPTURED BICEP TENDON     Family History   Problem Relation Age of Onset   • Breast cancer Mother    • Breast cancer Maternal Aunt      Social History     Tobacco Use   • Smoking status: Former Smoker     Years: 44.00     Types: Cigarettes     Last attempt to quit: 2010     Years since quittin.9   • Smokeless tobacco: Never Used   Substance Use Topics   • Alcohol use: Yes      Comment: Rare   • Drug use: No     Medications Prior to Admission   Medication Sig Dispense Refill Last Dose   • calcium carb-cholecalciferol (CALCIUM 600+D) 600-800 MG-UNIT tablet Take 1 tablet by mouth Every Night.   12/3/2018 at 2100   • estrogens, conjugated, (PREMARIN) 0.45 MG tablet Take 0.45 mg by mouth Every Night. Take daily for 21 days then do not take for 7 days.    12/3/2018 at 2100   • ferrous sulfate 140 (45 Fe) MG tablet controlled-release tablet Take 140 mg by mouth Daily With Breakfast.   12/3/2018 at 2100   • fluticasone (FLONASE) 50 MCG/ACT nasal spray 2 sprays into the nostril(s) as directed by provider As Needed for Rhinitis.   12/3/2018 at 0700   • losartan (COZAAR) 25 MG tablet Take 25 mg by mouth Daily.   12/3/2018 at 2100   • pantoprazole (PROTONIX) 20 MG EC tablet 1 pill by mouth every morning before breakfast 4 weeks, then every other day for 2 weeks, then every third day for 2 weeks and then stop. 60 tablet 0 12/3/2018 at 0700   • traZODone (DESYREL) 100 MG tablet Take 100 mg by mouth Every Night.   12/3/2018 at 2100   • amitriptyline (ELAVIL) 25 MG tablet Take 12.5 mg by mouth Every Night.   9/17/2018 at Unknown time     Allergies:  Mobic [meloxicam]     Review of Systems   Constitutional: Positive for appetite change. Negative for activity change and fatigue.   HENT: Negative for congestion.    Respiratory: Negative for cough, chest tightness, shortness of breath and wheezing.    Cardiovascular: Negative for chest pain.   Gastrointestinal: Positive for abdominal pain, nausea and vomiting. Negative for abdominal distention and diarrhea.   Endocrine: Negative for polyphagia and polyuria.   Genitourinary: Negative for frequency.   Musculoskeletal: Positive for arthralgias. Negative for back pain.   Skin: Negative for rash.   Neurological: Negative for light-headedness.   Hematological: Does not bruise/bleed easily.   Psychiatric/Behavioral: Negative for agitation and behavioral  "problems.       Vital Signs  Temp:  [98.1 °F (36.7 °C)-99.4 °F (37.4 °C)] 99.4 °F (37.4 °C)  Heart Rate:  [73-94] 73  Resp:  [16-20] 16  BP: ()/() 94/58  Oxygen Therapy  SpO2: 96 %  Pulse Oximetry Type: Intermittent  Device (Oxygen Therapy): room air}  Body mass index is 21.8 kg/m².  Flowsheet Rows      First Filed Value   Admission Height  162 cm (63.78\") Documented at 12/04/2018 0215   Admission Weight  56.7 kg (125 lb) Documented at 12/04/2018 0215                 Physical Exam   Constitutional: She appears well-developed and well-nourished.   HENT:   Head: Normocephalic.   Mouth/Throat: Oropharynx is clear and moist.   Eyes: Conjunctivae are normal.   Neck: Normal range of motion. No JVD present. No thyromegaly present.   Cardiovascular: Normal rate, regular rhythm and normal heart sounds.   No murmur heard.  Pulmonary/Chest: Effort normal and breath sounds normal. No respiratory distress. She has no wheezes. She has no rales.   Abdominal: Soft. Bowel sounds are normal. She exhibits no distension. There is tenderness in the right upper quadrant and epigastric area. There is no guarding.   Neurological: She is alert.   Skin: Skin is warm and dry. No rash noted.   Nursing note and vitals reviewed.       Debilities/Disabilities Identified: None    Emotional Behavior: Anxious    Results Review:    I reviewed the patient's new clinical results.  Lab Results (most recent)     Procedure Component Value Units Date/Time    Lactic Acid, Reflex [[id number]]  (Normal) Collected:  12/04/18 0702    Specimen:  Blood Updated:  12/04/18 0736     Lactate 0.8 mmol/L     Lactic Acid, Reflex Timer (This will reflex a repeat order 3-3:15 hours after ordered.) [[id number]] Collected:  12/04/18 0310    Specimen:  Blood Updated:  12/04/18 0646     Extra Tube Hold for add-ons.     Comment: Auto resulted.       [place] Draw [[id number]] Collected:  12/04/18 0237    Specimen:  Blood Updated:  12/04/18 0896    Narrative:       The " following orders were created for panel order [place] Draw.  Procedure                               Abnormality         Status                     ---------                               -----------         ------                     Light Blue Top[[id number]]                                                              Green Top (Gel)[[id number]]                                  Final result               Lavender Top[[id number]]                                     Final result               Gold Top - SST[[id number]]                                                                Please view results for these tests on the individual orders.    Green Top (Gel) [[id number]] Collected:  12/04/18 0237    Specimen:  Blood Updated:  12/04/18 0345     Extra Tube Hold for add-ons.     Comment: Auto resulted.       Lavender Top [[id number]] Collected:  12/04/18 0237    Specimen:  Blood Updated:  12/04/18 0345     Extra Tube hold for add-on     Comment: Auto resulted       Urinalysis, Microscopic Only - Urine, Clean Catch [[id number]]  (Abnormal) Collected:  12/04/18 0321    Specimen:  Urine, Clean Catch Updated:  12/04/18 0345     RBC, UA 3-5 /HPF      WBC, UA 3-5 /HPF      Bacteria, UA Trace /HPF      Squamous Epithelial Cells, UA 3-6 /HPF      Hyaline Casts, UA None Seen /LPF      Amorphous Crystals, UA Small/1+ /HPF      Methodology Manual Light Microscopy    Urinalysis With Microscopic If Indicated (No Culture) - Urine, Clean Catch [[id number]]  (Abnormal) Collected:  12/04/18 0321    Specimen:  Urine, Clean Catch Updated:  12/04/18 0338     Color, UA Yellow     Appearance, UA Slightly Cloudy     pH, UA 8.5     Specific Gravity, UA 1.020     Glucose, UA Negative     Ketones, UA >=160 mg/dL (4+)     Bilirubin, UA Negative     Blood, UA Trace     Protein, UA Trace     Leuk Esterase, UA Negative     Nitrite, UA Negative     Urobilinogen, UA 0.2 E.U./dL    Lactic Acid, Plasma [[id number]]  (Abnormal) Collected:  12/04/18 0310     Specimen:  Blood Updated:  12/04/18 0335     Lactate 3.4 mmol/L     Troponin [[id number]]  (Abnormal) Collected:  12/04/18 0237    Specimen:  Blood Updated:  12/04/18 0327     Troponin T 0.141 ng/mL     Narrative:       Troponin T Reference Ranges:  Less than 0.03 ng/mL:    Negative for AMI  0.03 to 0.09 ng/mL:      Indeterminant for AMI  Greater than 0.09 ng/mL: Positive for AMI    Comprehensive Metabolic Panel [[id number]]  (Abnormal) Collected:  12/04/18 0237    Specimen:  Blood Updated:  12/04/18 0305     Glucose 141 mg/dL      BUN 16 mg/dL      Creatinine 1.09 mg/dL      Sodium 138 mmol/L      Potassium 3.8 mmol/L      Chloride 96 mmol/L      CO2 22.5 mmol/L      Calcium 9.7 mg/dL      Total Protein 7.4 g/dL      Albumin 4.50 g/dL      ALT (SGPT) 40 U/L      AST (SGOT) 46 U/L      Alkaline Phosphatase 67 U/L      Total Bilirubin 0.9 mg/dL      eGFR Non African Amer 50 mL/min/1.73      Globulin 2.9 gm/dL      A/G Ratio 1.6 g/dL      BUN/Creatinine Ratio 14.7     Anion Gap 19.5 mmol/L     Lipase [[id number]]  (Abnormal) Collected:  12/04/18 0237    Specimen:  Blood Updated:  12/04/18 0305     Lipase 75 U/L     CBC & Differential [[id number]] Collected:  12/04/18 0237    Specimen:  Blood Updated:  12/04/18 0245    Narrative:       The following orders were created for panel order CBC & Differential.  Procedure                               Abnormality         Status                     ---------                               -----------         ------                     CBC Auto Differential[[id number]]        Abnormal            Final result                 Please view results for these tests on the individual orders.    CBC Auto Differential [[id number]]  (Abnormal) Collected:  12/04/18 0237    Specimen:  Blood Updated:  12/04/18 0245     WBC 13.64 10*3/mm3      RBC 4.18 10*6/mm3      Hemoglobin 12.7 g/dL      Hematocrit 36.7 %      MCV 87.8 fL      MCH 30.4 pg      MCHC 34.6 g/dL      RDW 13.2 %      RDW-SD 42.9 fl       MPV 10.9 fL      Platelets 199 10*3/mm3      Neutrophil % 79.2 %      Lymphocyte % 10.1 %      Monocyte % 9.7 %      Eosinophil % 0.2 %      Basophil % 0.4 %      Immature Grans % 0.4 %      Neutrophils, Absolute 10.79 10*3/mm3      Lymphocytes, Absolute 1.38 10*3/mm3      Monocytes, Absolute 1.32 10*3/mm3      Eosinophils, Absolute 0.03 10*3/mm3      Basophils, Absolute 0.06 10*3/mm3      Immature Grans, Absolute 0.06 10*3/mm3      nRBC 0.0 /100 WBC           Imaging Results (most recent)     None        not reviewed    ECG/EMG Results (most recent)     Procedure Component Value Units Date/Time    ECG 12 Lead [[id number]] Collected:  12/04/18 0255     Updated:  12/04/18 0257        reviewed    Assessment/Plan       1.  Recurrent esophagitis will start IV PPI IV fluids bowel rest and get GI see patient consultation    2.  Elevated troponin likely recurrent type II myocardial infarction.  Patient does have some flipped T waves in her lateral leads when compared to EKG of June 2018.  will repeat troponin and cardiology will be consulted.    3.  Hypertension controlled nothing acute    4.  Chronic insomnia stable      5.  DVT prophylaxis SCDs  ordered          I discussed the patients findings and my recommendations with patient     Jaskaran Zhang MD  12/04/18  7:40 AM

## 2018-12-04 NOTE — ED PROVIDER NOTES
EMERGENCY DEPARTMENT ENCOUNTER    CHIEF COMPLAINT  Chief Complaint: epigastric pain, vomiting  Room Number: 5/05  PMD: Jaskaran Zhang MD      HPI:  Pt is a 63 y.o. female who presents complaining of epigastric pain and vomiting.  Began Saturday.  Waxes/wanes.  Burning in epigastric area.  Severe at worst.  Nothing worsens.  Vomiting with dry heaves.  No diarrhea or bloody stool.  Pt has h/o similar this summer due to erosive gastritis (neg cardiac ALLEN, abN EGD).  Recently had pantoprazole weaned back by Dr Hays.      Duration/Timing: Saturday  Location: epigastrium  Quality: buring  Intensity/Severity: severe  Associated Symptoms: N.V  Aggravating Factors or Alleviating Factors: None  Previous Episodes: Yes  Treatment before arrival: As above    PAST MEDICAL HISTORY  Active Ambulatory Problems     Diagnosis Date Noted   • Epigastric pain 06/25/2018   • Bilious vomiting with nausea 06/26/2018   • Type 2 myocardial infarction (CMS/[facility]) 06/27/2018   • Esophagitis 06/28/2018     Resolved Ambulatory Problems     Diagnosis Date Noted   • NSTEMI (non-ST elevated myocardial infarction) (CMS/[facility]) 06/27/2018     Past Medical History:   Diagnosis Date   • Anemia    • Arthritis    • GERD (gastroesophageal reflux disease)    • History of cardiac cath    • Hypertension    • Non-STEMI (non-ST elevated myocardial infarction) (CMS/[facility]) 06/25/2018       PAST SURGICAL HISTORY  Past Surgical History:   Procedure Laterality Date   • CATARACT EXTRACTION Bilateral    • GLAUCOMA SURGERY     • HYSTERECTOMY     • SHOULDER SURGERY Right     RUPTURED BICEP TENDON       FAMILY HISTORY  Family History   Problem Relation Age of Onset   • Breast cancer Mother    • Breast cancer Maternal Aunt        SOCIAL HISTORY  Social History     Socioeconomic History   • Marital status:      Spouse name: Not on file   • Number of children: Not on file   • Years of education: Not on file   • Highest education level: Not on file   Social  Needs   • Financial resource strain: Not on file   • Food insecurity - worry: Not on file   • Food insecurity - inability: Not on file   • Transportation needs - medical: Not on file   • Transportation needs - non-medical: Not on file   Occupational History   • Not on file   Tobacco Use   • Smoking status: Former Smoker     Years: 44.00     Types: Cigarettes     Last attempt to quit: 2010     Years since quittin.9   • Smokeless tobacco: Never Used   Substance and Sexual Activity   • Alcohol use: Yes     Comment: Rare   • Drug use: No   • Sexual activity: Defer   Other Topics Concern   • Not on file   Social History Narrative   • Not on file       ALLERGIES  Mobic [meloxicam]    REVIEW OF SYSTEMS  Review of Systems   Constitutional: Positive for appetite change. Negative for fever.   HENT: Negative.  Negative for sore throat.    Eyes: Negative.    Respiratory: Positive for shortness of breath (At times). Negative for cough.    Cardiovascular: Negative.  Negative for chest pain.   Gastrointestinal: Positive for abdominal pain, nausea and vomiting (Several times last several days). Negative for blood in stool and diarrhea.   Genitourinary: Negative.  Negative for dysuria.   Musculoskeletal: Negative.  Negative for back pain.   Skin: Negative.  Negative for rash.   Neurological: Negative.  Negative for headaches.   All other systems reviewed and are negative.      PHYSICAL EXAM  ED Triage Vitals [18 0215]   Temp Heart Rate Resp BP SpO2   98.1 °F (36.7 °C) 94 20 (!) 154/108 97 %      Temp src Heart Rate Source Patient Position BP Location FiO2 (%)   Oral -- Lying Right arm --       Physical Exam   Constitutional: She is oriented to person, place, and time.   Anxious in mild/mod distress     Eyes: Conjunctivae are normal. Pupils are equal, round, and reactive to light.   Cardiovascular: Normal rate and regular rhythm.   Pulmonary/Chest: Effort normal and breath sounds normal.   Abdominal: There is  tenderness (Moderate epigastric TTP).   Musculoskeletal: Normal range of motion.   Neurological: She is alert and oriented to person, place, and time.   Skin: Skin is warm and dry.       LAB RESULTS  Recent Results (from the past 24 hour(s))   Comprehensive Metabolic Panel    Collection Time: 12/04/18  2:37 AM   Result Value Ref Range    Glucose 141 (H) 65 - 99 mg/dL    BUN 16 8 - 23 mg/dL    Creatinine 1.09 (H) 0.57 - 1.00 mg/dL    Sodium 138 136 - 145 mmol/L    Potassium 3.8 3.5 - 5.2 mmol/L    Chloride 96 (L) 98 - 107 mmol/L    CO2 22.5 22.0 - 29.0 mmol/L    Calcium 9.7 8.8 - 10.5 mg/dL    Total Protein 7.4 6.0 - 8.5 g/dL    Albumin 4.50 3.50 - 5.20 g/dL    ALT (SGPT) 40 (H) 5 - 33 U/L    AST (SGOT) 46 (H) 5 - 32 U/L    Alkaline Phosphatase 67 40 - 129 U/L    Total Bilirubin 0.9 0.2 - 1.2 mg/dL    eGFR Non African Amer 50 (L) >60 mL/min/1.73    Globulin 2.9 gm/dL    A/G Ratio 1.6 g/dL    BUN/Creatinine Ratio 14.7 7.0 - 25.0    Anion Gap 19.5 mmol/L   Lipase    Collection Time: 12/04/18  2:37 AM   Result Value Ref Range    Lipase 75 (H) 13 - 60 U/L   Green Top (Gel)    Collection Time: 12/04/18  2:37 AM   Result Value Ref Range    Extra Tube Hold for add-ons.    Lavender Top    Collection Time: 12/04/18  2:37 AM   Result Value Ref Range    Extra Tube hold for add-on    CBC Auto Differential    Collection Time: 12/04/18  2:37 AM   Result Value Ref Range    WBC 13.64 (H) 4.80 - 10.80 10*3/mm3    RBC 4.18 (L) 4.20 - 5.40 10*6/mm3    Hemoglobin 12.7 12.0 - 16.0 g/dL    Hematocrit 36.7 (L) 37.0 - 47.0 %    MCV 87.8 81.0 - 99.0 fL    MCH 30.4 27.0 - 31.0 pg    MCHC 34.6 31.0 - 37.0 g/dL    RDW 13.2 11.5 - 14.5 %    RDW-SD 42.9 37.0 - 54.0 fl    MPV 10.9 (H) 7.4 - 10.4 fL    Platelets 199 140 - 500 10*3/mm3    Neutrophil % 79.2 (H) 45.0 - 70.0 %    Lymphocyte % 10.1 (L) 20.0 - 45.0 %    Monocyte % 9.7 (H) 3.0 - 8.0 %    Eosinophil % 0.2 0.0 - 4.0 %    Basophil % 0.4 0.0 - 2.0 %    Immature Grans % 0.4 0.0 - 0.5 %     Neutrophils, Absolute 10.79 (H) 1.50 - 8.30 10*3/mm3    Lymphocytes, Absolute 1.38 0.60 - 4.80 10*3/mm3    Monocytes, Absolute 1.32 (H) 0.00 - 1.00 10*3/mm3    Eosinophils, Absolute 0.03 (L) 0.10 - 0.30 10*3/mm3    Basophils, Absolute 0.06 0.00 - 0.20 10*3/mm3    Immature Grans, Absolute 0.06 (H) 0.00 - 0.03 10*3/mm3    nRBC 0.0 0.0 - 0.0 /100 WBC   Troponin    Collection Time: 12/04/18  2:37 AM   Result Value Ref Range    Troponin T 0.141 (C) 0.000 - 0.030 ng/mL   Lactic Acid, Plasma    Collection Time: 12/04/18  3:10 AM   Result Value Ref Range    Lactate 3.4 (C) 0.5 - 2.0 mmol/L   Urinalysis With Microscopic If Indicated (No Culture) - Urine, Clean Catch    Collection Time: 12/04/18  3:21 AM   Result Value Ref Range    Color, UA Yellow Yellow, Straw    Appearance, UA Slightly Cloudy (A) Clear    pH, UA 8.5 (H) 4.5 - 8.0    Specific Gravity, UA 1.020 1.003 - 1.030    Glucose, UA Negative Negative    Ketones, UA >=160 mg/dL (4+) Negative, 80 mg/dL (3+), >=160 mg/dL (4+)    Bilirubin, UA Negative Negative    Blood, UA Trace (A) Negative    Protein, UA Trace (A) Negative    Leuk Esterase, UA Negative Negative    Nitrite, UA Negative Negative    Urobilinogen, UA 0.2 E.U./dL 0.2 - 1.0 E.U./dL   Urinalysis, Microscopic Only - Urine, Clean Catch    Collection Time: 12/04/18  3:21 AM   Result Value Ref Range    RBC, UA 3-5 (A) None Seen /HPF    WBC, UA 3-5 (A) None Seen /HPF    Bacteria, UA Trace (A) None Seen /HPF    Squamous Epithelial Cells, UA 3-6 (A) None Seen, 0-2 /HPF    Hyaline Casts, UA None Seen None Seen /LPF    Amorphous Crystals, UA Small/1+ None Seen /HPF    Methodology Manual Light Microscopy        I ordered the above labs and reviewed the results    RADIOLOGY  No orders to display          PROCEDURES  Critical Care  Performed by: Biju Alva MD  Authorized by: Biju Alva MD     Critical care provider statement:     Critical care time (minutes):  35    Critical care was necessary to treat or  prevent imminent or life-threatening deterioration of the following conditions:  Circulatory failure and cardiac failure    Critical care was time spent personally by me on the following activities:  Ordering and performing treatments and interventions, ordering and review of laboratory studies, pulse oximetry, re-evaluation of patient's condition, review of old charts, discussions with consultants, development of treatment plan with patient or surrogate, discussions with primary provider, evaluation of patient's response to treatment and examination of patient      EKG          EKG time: 0255  Rhythm/Rate: Sinus 95  P waves and OR: N, N  QRS, axis: N, LAD   ST and T waves: Flattened inf and inverted in V6     Interpreted Contemporaneously by me, independently viewed  changed compared to prior 6/2018 - T inversion in v6 are new, inf T more flat      PROGRESS AND CONSULTS     0250 - Reviewed old records - admit with epigsastric/CP in summer and found to have errosive esophagitis/gastritis.  Pt recently weaning down on PPI, drank alcohol last week.  Will give IVF, IV DZ and IV protonix.  Check labs.  EKG sl different than prior.  Neg heart cath in June.    0345 - Troponin elevation noted.  Had similar pattern in June - Troponin elevation due to severity of abd pathology.  I contacted Dr Dunbar about possible transfer to [place] - he felt that repeat cath may not be necessary.  He rec admit at [facility] and have pt seen in AM by Dr Bai.  Will give ASA PO, hold heparin or other strong anticoagulants due to likelihood of GI disease over true coronary obstruction.  I contacted Dr Zhang who is amenable to this plan.  Discussed Lactic Acid elevation - he did not want blood cultures at this time.  Also did not want 30/kg bolus - pt already received 1L NS in ED.  Pt feeling much better after IV meds (DZ, Protonix and IVF).  Discussed findings and tx plan with pt and spouse.      MEDICAL DECISION MAKING  Results were  reviewed/discussed with the patient and they were also made aware of online access. Pt also made aware that some labs, such as cultures, will not be resulted during ER visit and follow up with PMD is necessary.     MDM       DIAGNOSIS  Final diagnoses:   Epigastric pain   Acute gastritis without hemorrhage, unspecified gastritis type   Elevated troponin   Lactic acid increased       DISPOSITION  ADMISSION    Discussed treatment plan and reason for admission with pt/family and admitting physician.  Pt/family voiced understanding of the plan for admission for further testing/treatment as needed.         Latest Documented Vital Signs:  As of 3:50 AM  BP- (!) 154/108 HR- 94 Temp- 98.1 °F (36.7 °C) (Oral) O2 sat- 97%       Biju Alva MD  12/04/18 0354

## 2018-12-04 NOTE — PLAN OF CARE
Problem: Patient Care Overview  Goal: Plan of Care Review  Outcome: Ongoing (interventions implemented as appropriate)   12/04/18 0642   Coping/Psychosocial   Plan of Care Reviewed With patient   Plan of Care Review   Progress no change   OTHER   Outcome Summary Pt. reports no nausea at this time; IVF infusing; GI and cardiology consulted; is on clear liquids; is on telemetry       Problem: Nausea/Vomiting (Adult)  Goal: Identify Related Risk Factors and Signs and Symptoms  Outcome: Ongoing (interventions implemented as appropriate)   12/04/18 0642   Nausea/Vomiting (Adult)   Related Risk Factors (Nausea/Vomiting) gastrointestinal dysfunction     Goal: Symptom Relief  Outcome: Ongoing (interventions implemented as appropriate)   12/04/18 0642   Nausea/Vomiting (Adult)   Symptom Relief making progress toward outcome     Goal: Adequate Hydration  Outcome: Ongoing (interventions implemented as appropriate)   12/04/18 0642   Nausea/Vomiting (Adult)   Adequate Hydration making progress toward outcome

## 2018-12-04 NOTE — ED NOTES
Dr. Zhang was called and spoke to Dr. Alva regarding the patient.     Jessie Miller  12/04/18 0342

## 2018-12-04 NOTE — NURSING NOTE
Discharge Planning Assessment  [facility]     Patient Name: Lila Pabon  MRN: [id number]  Today's Date: 12/4/2018    Admit Date: 12/4/2018    Discharge Needs Assessment     Row Name 12/04/18 1122       Living Environment    Lives With  spouse    Name(s) of Who Lives With Patient  -- Zay Pabon    Current Living Arrangements  home/apartment/condo    Primary Care Provided by  self    Provides Primary Care For  no one    Family Caregiver if Needed  spouse    Quality of Family Relationships  helpful;involved;supportive    Able to Return to Prior Arrangements  yes       Resource/Environmental Concerns    Resource/Environmental Concerns  none       Transition Planning    Patient/Family Anticipates Transition to  home with family    Transportation Anticipated  family or friend will provide       Discharge Needs Assessment    Readmission Within the Last 30 Days  no previous admission in last 30 days    Concerns to be Addressed  discharge planning    Equipment Currently Used at Home  none    Anticipated Changes Related to Illness  none    Equipment Needed After Discharge  none        Discharge Plan     Row Name 12/04/18 1124       Plan    Plan  Home when stable    Patient/Family in Agreement with Plan  yes    Plan Comments  Spoke with Mrs Pabon at bedside.  She livesin a house in [place] with her  [place].  She does not have home health, DME or oxygen.  She is independent with her ADL's and drives herself.  Her pharmacy is [facility] in [place] and there are no issues with paying for her prescriptions.  She does not have an advanced directive and has no interest in such.   Plan is home when stable.  Will continue to follow        Destination      No service coordination in this encounter.      Durable Medical Equipment      No service coordination in this encounter.      Dialysis/Infusion      No service coordination in this encounter.      Home Medical Care      No service coordination in this encounter.      Community  Resources      No service coordination in this encounter.          Demographic Summary     Row Name 12/04/18 1121       General Information    Admission Type  inpatient    Arrived From  home    Required Notices Provided  Important Message from Medicare    Referral Source  admission list    Reason for Consult  discharge planning    Preferred Language  English     Used During This Interaction  no       Contact Information    Permission Granted to Share Info With          Functional Status    No documentation.       Psychosocial    No documentation.       Abuse/Neglect    No documentation.       Legal    No documentation.       Substance Abuse    No documentation.       Patient Forms    No documentation.           Ashleigh Alva RN

## 2018-12-04 NOTE — ED NOTES
Notified MD of lactic of 3.4.  MD instructed to not order blood cultures.     Keli Covarrubias, RN  12/04/18 0347

## 2018-12-04 NOTE — CONSULTS
Date of Hospital Visit: 18  Encounter Provider: Vincent Bai MD  Place of Service: [facility] CARDIOLOGY  Patient Name: Lila Pabon  :1955  Referral Provider: Pablo Zhang*    Chief complaint: abdominal pain, nausea vomiting    History of Present Illness    Ms. Pabon is a 62yo woman who has had gnawing epigastric pain and heartburn for several days.  Then yesterday she had severe epigastric pain and bilious vomiting.  She was not having chest pain or dyspnea.  She had a similar presentation in 2018 and her troponin was elevated.  An echo showed subtle anterolateral hypokinesis but coronary angiography was normal.  I suspected this was a variant of stress induced cardiomyopathy.      She was diagnosed with grade IV esophagitis at that time.  Her PPI dose was recently decreased.  She received IV pain medications and her pain is better.    Her EKG shows anterolateral TWI.      Past Medical History:   Diagnosis Date   • Anemia     IRON SUPPLEMENTS   • Arthritis    • GERD (gastroesophageal reflux disease)    • History of cardiac cath    • Hypertension    • Non-STEMI (non-ST elevated myocardial infarction) (CMS/[facility]) 2018       Past Surgical History:   Procedure Laterality Date   • CATARACT EXTRACTION Bilateral    • CATARACT EXTRACTION     • GLAUCOMA SURGERY     • HYSTERECTOMY     • SHOULDER SURGERY Right     RUPTURED BICEP TENDON       Prior to Admission medications    Medication Sig Start Date End Date Taking? Authorizing Provider   calcium carb-cholecalciferol (CALCIUM 600+D) 600-800 MG-UNIT tablet Take 1 tablet by mouth Every Night.   Yes Marek Aguero MD   estrogens, conjugated, (PREMARIN) 0.45 MG tablet Take 0.45 mg by mouth Every Night. Take daily for 21 days then do not take for 7 days.    Yes Marek Aguero MD   ferrous sulfate 140 (45 Fe) MG tablet controlled-release tablet Take 140 mg by mouth Daily With Breakfast.   Yes Provider  MD Marek   fluticasone (FLONASE) 50 MCG/ACT nasal spray 2 sprays into the nostril(s) as directed by provider As Needed for Rhinitis.   Yes Marek Aguero MD   losartan (COZAAR) 25 MG tablet Take 25 mg by mouth Daily.   Yes Marek Aguero MD   pantoprazole (PROTONIX) 20 MG EC tablet 1 pill by mouth every morning before breakfast 4 weeks, then every other day for 2 weeks, then every third day for 2 weeks and then stop. 10/31/18  Yes Rupa Hays MD   traZODone (DESYREL) 100 MG tablet Take 100 mg by mouth Every Night.   Yes Marek Aguero MD   amitriptyline (ELAVIL) 25 MG tablet Take 12.5 mg by mouth Every Night.    ProviderMarek MD       Social History     Socioeconomic History   • Marital status:      Spouse name: Not on file   • Number of children: Not on file   • Years of education: Not on file   • Highest education level: Not on file   Social Needs   • Financial resource strain: Not on file   • Food insecurity - worry: Not on file   • Food insecurity - inability: Not on file   • Transportation needs - medical: Not on file   • Transportation needs - non-medical: Not on file   Occupational History   • Not on file   Tobacco Use   • Smoking status: Former Smoker     Years: 44.00     Types: Cigarettes     Last attempt to quit: 2010     Years since quittin.9   • Smokeless tobacco: Never Used   Substance and Sexual Activity   • Alcohol use: Yes     Comment: Rare   • Drug use: No   • Sexual activity: Defer   Other Topics Concern   • Not on file   Social History Narrative   • Not on file       Family History   Problem Relation Age of Onset   • Breast cancer Mother    • Breast cancer Maternal Aunt        Review of Systems   Gastrointestinal: Positive for abdominal pain, nausea and vomiting.   All other systems reviewed and are negative.       Objective:     Vitals:    18 0215 18 0351 18 0443   BP: (!) 154/108 108/63 94/58   BP Location: Right arm Left  "arm Left arm   Patient Position: Lying Lying Lying   Pulse: 94 76 73   Resp: 20 16 16   Temp: 98.1 °F (36.7 °C) 98.9 °F (37.2 °C) 99.4 °F (37.4 °C)   TempSrc: Oral Oral Oral   SpO2: 97% 98% 96%   Weight: 56.7 kg (125 lb)  57.6 kg (127 lb)   Height: 162 cm (63.78\")  162.6 cm (64\")     Body mass index is 21.8 kg/m².  Flowsheet Rows      First Filed Value   Admission Height  162 cm (63.78\") Documented at 12/04/2018 0215   Admission Weight  56.7 kg (125 lb) Documented at 12/04/2018 0215          Physical Exam   Constitutional: She is oriented to person, place, and time. She appears well-developed and well-nourished.   HENT:   Head: Normocephalic.   Nose: Nose normal.   Mouth/Throat: Oropharynx is clear and moist.   Eyes: Conjunctivae and EOM are normal. Pupils are equal, round, and reactive to light.   Neck: Normal range of motion. No JVD present.   Cardiovascular: Normal rate, regular rhythm, normal heart sounds and intact distal pulses.   Pulmonary/Chest: Effort normal and breath sounds normal.   Abdominal: Soft. She exhibits no mass. There is no tenderness.   Musculoskeletal: Normal range of motion. She exhibits no edema.   Neurological: She is alert and oriented to person, place, and time. No cranial nerve deficit.   Skin: Skin is warm and dry. No erythema.   Psychiatric: She has a normal mood and affect. Her behavior is normal. Judgment and thought content normal.   Vitals reviewed.              Lab Review:                Results from last 7 days   Lab Units  12/04/18   0237   SODIUM mmol/L  138   POTASSIUM mmol/L  3.8   CHLORIDE mmol/L  96*   CO2 mmol/L  22.5   BUN mg/dL  16   CREATININE mg/dL  1.09*   GLUCOSE mg/dL  141*   CALCIUM mg/dL  9.7     Results from last 7 days   Lab Units  12/04/18   0645  12/04/18   0237   TROPONIN T ng/mL  0.102*  0.141*     Results from last 7 days   Lab Units  12/04/18   0237   WBC 10*3/mm3  13.64*   HEMOGLOBIN g/dL  12.7   HEMATOCRIT %  36.7*   PLATELETS 10*3/mm3  199     I " personally viewed and interpreted the patient's EKG/Telemetry data -- NSR, anterolateral TWI    Assessment/Plan:     1.  Epigastric pain  2.  Bilious vomiting with nausea  3.  Esophagitis  4.  Type 2 myocardial infarction (CMS/HCC)    Her coronaries were normal not even six months ago, when she presented similarly.  The EKG changes today are new and correspond to the previously seen wall motion abnormality.  I think she either has a tiny vessel that is diseased and causing problems when she's sick or she has a variant of stress-induced CMP. That being said she is a 62yo woman on estrogen therapy (although she doesn't smoke and does have DM). I'll check an echo today.  I am reticent to use any strong blood thinners/antiplatelet agents due to her esophagitis history.     Add:    We have had a truly extraordinary number of echocardiograms to perform today.  Ms Pabon's will be performed first thing in the morning.  I will repeat and EKG and Tn in AM.  SHe has had no chest pain or arrhythmia today.

## 2018-12-04 NOTE — ED NOTES
Dr. Alva aware of positive simple sepsis, new orders noted.     Keli Covarrubias, RN  12/04/18 0251

## 2018-12-05 ENCOUNTER — APPOINTMENT (OUTPATIENT)
Dept: GENERAL RADIOLOGY | Facility: HOSPITAL | Age: 63
End: 2018-12-05

## 2018-12-05 ENCOUNTER — APPOINTMENT (OUTPATIENT)
Dept: CT IMAGING | Facility: HOSPITAL | Age: 63
End: 2018-12-05

## 2018-12-05 ENCOUNTER — APPOINTMENT (OUTPATIENT)
Dept: CARDIOLOGY | Facility: HOSPITAL | Age: 63
End: 2018-12-05
Attending: INTERNAL MEDICINE

## 2018-12-05 LAB
ALBUMIN SERPL-MCNC: 3.4 G/DL (ref 3.5–5.2)
ALBUMIN/GLOB SERPL: 1.4 G/DL
ALP SERPL-CCNC: 65 U/L (ref 40–129)
ALT SERPL W P-5'-P-CCNC: 50 U/L (ref 5–33)
ANION GAP SERPL CALCULATED.3IONS-SCNC: 12.1 MMOL/L
AORTIC DIMENSIONLESS INDEX: 0.8 (DI)
AST SERPL-CCNC: 53 U/L (ref 5–32)
BH CV ECHO MEAS - ACS: 1.6 CM
BH CV ECHO MEAS - AO MAX PG (FULL): 2.1 MMHG
BH CV ECHO MEAS - AO MAX PG: 7.1 MMHG
BH CV ECHO MEAS - AO MEAN PG (FULL): 2 MMHG
BH CV ECHO MEAS - AO MEAN PG: 4 MMHG
BH CV ECHO MEAS - AO ROOT AREA (BSA CORRECTED): 1.9
BH CV ECHO MEAS - AO ROOT AREA: 7.5 CM^2
BH CV ECHO MEAS - AO ROOT DIAM: 3.1 CM
BH CV ECHO MEAS - AO V2 MAX: 133 CM/SEC
BH CV ECHO MEAS - AO V2 MEAN: 92.3 CM/SEC
BH CV ECHO MEAS - AO V2 VTI: 28.8 CM
BH CV ECHO MEAS - AVA(I,A): 1.9 CM^2
BH CV ECHO MEAS - AVA(I,D): 1.9 CM^2
BH CV ECHO MEAS - AVA(V,A): 2.1 CM^2
BH CV ECHO MEAS - AVA(V,D): 2.1 CM^2
BH CV ECHO MEAS - BSA(HAYCOCK): 1.6 M^2
BH CV ECHO MEAS - BSA: 1.6 M^2
BH CV ECHO MEAS - BZI_BMI: 21.8 KILOGRAMS/M^2
BH CV ECHO MEAS - BZI_METRIC_HEIGHT: 162.6 CM
BH CV ECHO MEAS - BZI_METRIC_WEIGHT: 57.6 KG
BH CV ECHO MEAS - EDV(CUBED): 129.6 ML
BH CV ECHO MEAS - EDV(MOD-SP2): 82.8 ML
BH CV ECHO MEAS - EDV(MOD-SP4): 89.4 ML
BH CV ECHO MEAS - EDV(TEICH): 121.6 ML
BH CV ECHO MEAS - EF(CUBED): 69.9 %
BH CV ECHO MEAS - EF(MOD-BP): 54 %
BH CV ECHO MEAS - EF(MOD-SP2): 56.3 %
BH CV ECHO MEAS - EF(MOD-SP4): 52.1 %
BH CV ECHO MEAS - EF(TEICH): 61.3 %
BH CV ECHO MEAS - ESV(CUBED): 39 ML
BH CV ECHO MEAS - ESV(MOD-SP2): 36.2 ML
BH CV ECHO MEAS - ESV(MOD-SP4): 42.8 ML
BH CV ECHO MEAS - ESV(TEICH): 47.1 ML
BH CV ECHO MEAS - FS: 33 %
BH CV ECHO MEAS - IVS/LVPW: 0.98
BH CV ECHO MEAS - IVSD: 0.75 CM
BH CV ECHO MEAS - LA DIMENSION: 3.4 CM
BH CV ECHO MEAS - LA/AO: 1.1
BH CV ECHO MEAS - LAT PEAK E' VEL: 13 CM/SEC
BH CV ECHO MEAS - LV DIASTOLIC VOL/BSA (35-75): 55.4 ML/M^2
BH CV ECHO MEAS - LV MASS(C)D: 129 GRAMS
BH CV ECHO MEAS - LV MASS(C)DI: 80 GRAMS/M^2
BH CV ECHO MEAS - LV MAX PG: 4.9 MMHG
BH CV ECHO MEAS - LV MEAN PG: 2 MMHG
BH CV ECHO MEAS - LV SYSTOLIC VOL/BSA (12-30): 26.5 ML/M^2
BH CV ECHO MEAS - LV V1 MAX: 111 CM/SEC
BH CV ECHO MEAS - LV V1 MEAN: 66.5 CM/SEC
BH CV ECHO MEAS - LV V1 VTI: 21 CM
BH CV ECHO MEAS - LVIDD: 5.1 CM
BH CV ECHO MEAS - LVIDS: 3.4 CM
BH CV ECHO MEAS - LVLD AP2: 7.6 CM
BH CV ECHO MEAS - LVLD AP4: 7.5 CM
BH CV ECHO MEAS - LVLS AP2: 6.3 CM
BH CV ECHO MEAS - LVLS AP4: 6.2 CM
BH CV ECHO MEAS - LVOT AREA (M): 2.5 CM^2
BH CV ECHO MEAS - LVOT AREA: 2.5 CM^2
BH CV ECHO MEAS - LVOT DIAM: 1.8 CM
BH CV ECHO MEAS - LVPWD: 0.76 CM
BH CV ECHO MEAS - MED PEAK E' VEL: 10 CM/SEC
BH CV ECHO MEAS - MR MAX PG: 68.2 MMHG
BH CV ECHO MEAS - MR MAX VEL: 413 CM/SEC
BH CV ECHO MEAS - MV A DUR: 0.19 SEC
BH CV ECHO MEAS - MV A MAX VEL: 52.6 CM/SEC
BH CV ECHO MEAS - MV DEC SLOPE: 366 CM/SEC^2
BH CV ECHO MEAS - MV DEC TIME: 0.16 SEC
BH CV ECHO MEAS - MV E MAX VEL: 119 CM/SEC
BH CV ECHO MEAS - MV E/A: 2.3
BH CV ECHO MEAS - MV MAX PG: 5.7 MMHG
BH CV ECHO MEAS - MV MEAN PG: 2 MMHG
BH CV ECHO MEAS - MV P1/2T MAX VEL: 116 CM/SEC
BH CV ECHO MEAS - MV P1/2T: 92.8 MSEC
BH CV ECHO MEAS - MV V2 MAX: 119 CM/SEC
BH CV ECHO MEAS - MV V2 MEAN: 61.5 CM/SEC
BH CV ECHO MEAS - MV V2 VTI: 30.2 CM
BH CV ECHO MEAS - MVA P1/2T LCG: 1.9 CM^2
BH CV ECHO MEAS - MVA(P1/2T): 2.4 CM^2
BH CV ECHO MEAS - MVA(VTI): 1.8 CM^2
BH CV ECHO MEAS - PA ACC TIME: 0.14 SEC
BH CV ECHO MEAS - PA MAX PG (FULL): 2.6 MMHG
BH CV ECHO MEAS - PA MAX PG: 4.2 MMHG
BH CV ECHO MEAS - PA PR(ACCEL): 15.6 MMHG
BH CV ECHO MEAS - PA V2 MAX: 103 CM/SEC
BH CV ECHO MEAS - PULM A REVS DUR: 0.17 SEC
BH CV ECHO MEAS - PULM A REVS VEL: 42.7 CM/SEC
BH CV ECHO MEAS - PULM DIAS VEL: 65.1 CM/SEC
BH CV ECHO MEAS - PULM S/D: 1.1
BH CV ECHO MEAS - PULM SYS VEL: 70 CM/SEC
BH CV ECHO MEAS - PVA(V,A): 1.7 CM^2
BH CV ECHO MEAS - PVA(V,D): 1.7 CM^2
BH CV ECHO MEAS - QP/QS: 0.78
BH CV ECHO MEAS - RAP SYSTOLE: 8 MMHG
BH CV ECHO MEAS - RV MAX PG: 1.6 MMHG
BH CV ECHO MEAS - RV MEAN PG: 1 MMHG
BH CV ECHO MEAS - RV V1 MAX: 63.5 CM/SEC
BH CV ECHO MEAS - RV V1 MEAN: 39.6 CM/SEC
BH CV ECHO MEAS - RV V1 VTI: 14.7 CM
BH CV ECHO MEAS - RVOT AREA: 2.8 CM^2
BH CV ECHO MEAS - RVOT DIAM: 1.9 CM
BH CV ECHO MEAS - RVSP: 80.3 MMHG
BH CV ECHO MEAS - SI(AO): 134.8 ML/M^2
BH CV ECHO MEAS - SI(CUBED): 56.2 ML/M^2
BH CV ECHO MEAS - SI(LVOT): 33.1 ML/M^2
BH CV ECHO MEAS - SI(MOD-SP2): 28.9 ML/M^2
BH CV ECHO MEAS - SI(MOD-SP4): 28.9 ML/M^2
BH CV ECHO MEAS - SI(TEICH): 46.2 ML/M^2
BH CV ECHO MEAS - SV(AO): 217.4 ML
BH CV ECHO MEAS - SV(CUBED): 90.6 ML
BH CV ECHO MEAS - SV(LVOT): 53.4 ML
BH CV ECHO MEAS - SV(MOD-SP2): 46.6 ML
BH CV ECHO MEAS - SV(MOD-SP4): 46.6 ML
BH CV ECHO MEAS - SV(RVOT): 41.7 ML
BH CV ECHO MEAS - SV(TEICH): 74.5 ML
BH CV ECHO MEAS - TAPSE (>1.6): 3 CM2
BH CV ECHO MEAS - TR MAX VEL: 425 CM/SEC
BH CV ECHO MEASUREMENTS AVERAGE E/E' RATIO: 10.35
BH CV VAS BP RIGHT ARM: NORMAL MMHG
BH CV XLRA - RV BASE: 3.6 CM
BH CV XLRA - TDI S': 17 CM/SEC
BILIRUB SERPL-MCNC: 0.6 MG/DL (ref 0.2–1.2)
BUN BLD-MCNC: 16 MG/DL (ref 8–23)
BUN/CREAT SERPL: 18 (ref 7–25)
CALCIUM SPEC-SCNC: 7.9 MG/DL (ref 8.8–10.5)
CHLORIDE SERPL-SCNC: 106 MMOL/L (ref 98–107)
CO2 SERPL-SCNC: 22.9 MMOL/L (ref 22–29)
CREAT BLD-MCNC: 0.89 MG/DL (ref 0.57–1)
D DIMER PPP FEU-MCNC: 1.53 MCGFEU/ML (ref 0–0.46)
DEPRECATED RDW RBC AUTO: 45.6 FL (ref 37–54)
ERYTHROCYTE [DISTWIDTH] IN BLOOD BY AUTOMATED COUNT: 13.4 % (ref 11.5–14.5)
GFR SERPL CREATININE-BSD FRML MDRD: 64 ML/MIN/1.73
GLOBULIN UR ELPH-MCNC: 2.4 GM/DL
GLUCOSE BLD-MCNC: 82 MG/DL (ref 65–99)
HCT VFR BLD AUTO: 30.7 % (ref 37–47)
HGB BLD-MCNC: 10.3 G/DL (ref 12–16)
LEFT ATRIUM VOLUME INDEX: 38 ML/M2
LEFT ATRIUM VOLUME: 57 CM3
LV EF 2D ECHO EST: 54 %
MAXIMAL PREDICTED HEART RATE: 155 BPM
MCH RBC QN AUTO: 30.7 PG (ref 27–31)
MCHC RBC AUTO-ENTMCNC: 33.6 G/DL (ref 31–37)
MCV RBC AUTO: 91.6 FL (ref 81–99)
PLATELET # BLD AUTO: 144 10*3/MM3 (ref 140–500)
PMV BLD AUTO: 11.7 FL (ref 7.4–10.4)
POTASSIUM BLD-SCNC: 3.2 MMOL/L (ref 3.5–5.2)
PROT SERPL-MCNC: 5.8 G/DL (ref 6–8.5)
RBC # BLD AUTO: 3.35 10*6/MM3 (ref 4.2–5.4)
SODIUM BLD-SCNC: 141 MMOL/L (ref 136–145)
STRESS TARGET HR: 132 BPM
WBC NRBC COR # BLD: 8.9 10*3/MM3 (ref 4.8–10.8)

## 2018-12-05 PROCEDURE — 93306 TTE W/DOPPLER COMPLETE: CPT | Performed by: INTERNAL MEDICINE

## 2018-12-05 PROCEDURE — 85027 COMPLETE CBC AUTOMATED: CPT | Performed by: FAMILY MEDICINE

## 2018-12-05 PROCEDURE — 0 IOPAMIDOL PER 1 ML: Performed by: FAMILY MEDICINE

## 2018-12-05 PROCEDURE — 93010 ELECTROCARDIOGRAM REPORT: CPT | Performed by: INTERNAL MEDICINE

## 2018-12-05 PROCEDURE — 85379 FIBRIN DEGRADATION QUANT: CPT | Performed by: INTERNAL MEDICINE

## 2018-12-05 PROCEDURE — 93005 ELECTROCARDIOGRAM TRACING: CPT | Performed by: INTERNAL MEDICINE

## 2018-12-05 PROCEDURE — 71046 X-RAY EXAM CHEST 2 VIEWS: CPT

## 2018-12-05 PROCEDURE — 25810000003 SODIUM CHLORIDE 0.9 % WITH KCL 20 MEQ 20-0.9 MEQ/L-% SOLUTION: Performed by: FAMILY MEDICINE

## 2018-12-05 PROCEDURE — 99232 SBSQ HOSP IP/OBS MODERATE 35: CPT | Performed by: INTERNAL MEDICINE

## 2018-12-05 PROCEDURE — 99233 SBSQ HOSP IP/OBS HIGH 50: CPT | Performed by: INTERNAL MEDICINE

## 2018-12-05 PROCEDURE — 25010000002 POTASSIUM PER 2 MEQ: Performed by: FAMILY MEDICINE

## 2018-12-05 PROCEDURE — 25010000002 HYDROMORPHONE PER 4 MG: Performed by: FAMILY MEDICINE

## 2018-12-05 PROCEDURE — 80053 COMPREHEN METABOLIC PANEL: CPT | Performed by: FAMILY MEDICINE

## 2018-12-05 PROCEDURE — 93306 TTE W/DOPPLER COMPLETE: CPT

## 2018-12-05 PROCEDURE — 25010000002 ONDANSETRON PER 1 MG: Performed by: FAMILY MEDICINE

## 2018-12-05 PROCEDURE — 71275 CT ANGIOGRAPHY CHEST: CPT

## 2018-12-05 RX ORDER — POTASSIUM CHLORIDE 400 MEQ/1000ML
20 INJECTION, SOLUTION INTRAVENOUS
Status: COMPLETED | OUTPATIENT
Start: 2018-12-05 | End: 2018-12-05

## 2018-12-05 RX ORDER — POTASSIUM CHLORIDE 20 MEQ/1
40 TABLET, EXTENDED RELEASE ORAL ONCE
Status: DISCONTINUED | OUTPATIENT
Start: 2018-12-05 | End: 2018-12-05

## 2018-12-05 RX ORDER — SODIUM CHLORIDE AND POTASSIUM CHLORIDE 150; 900 MG/100ML; MG/100ML
75 INJECTION, SOLUTION INTRAVENOUS CONTINUOUS
Status: DISCONTINUED | OUTPATIENT
Start: 2018-12-05 | End: 2018-12-06

## 2018-12-05 RX ADMIN — POTASSIUM CHLORIDE AND SODIUM CHLORIDE 75 ML/HR: 900; 150 INJECTION, SOLUTION INTRAVENOUS at 09:02

## 2018-12-05 RX ADMIN — ONDANSETRON 4 MG: 2 SOLUTION INTRAMUSCULAR; INTRAVENOUS at 03:29

## 2018-12-05 RX ADMIN — SODIUM CHLORIDE 8 MG/HR: 900 INJECTION INTRAVENOUS at 05:13

## 2018-12-05 RX ADMIN — SODIUM CHLORIDE 8 MG/HR: 900 INJECTION INTRAVENOUS at 00:07

## 2018-12-05 RX ADMIN — HYDROMORPHONE HYDROCHLORIDE 0.5 MG: 2 INJECTION, SOLUTION INTRAMUSCULAR; INTRAVENOUS; SUBCUTANEOUS at 09:31

## 2018-12-05 RX ADMIN — SODIUM CHLORIDE, PRESERVATIVE FREE 3 ML: 5 INJECTION INTRAVENOUS at 20:43

## 2018-12-05 RX ADMIN — POTASSIUM CHLORIDE 20 MEQ: 400 INJECTION, SOLUTION INTRAVENOUS at 09:02

## 2018-12-05 RX ADMIN — ACETAMINOPHEN 650 MG: 325 TABLET, FILM COATED ORAL at 09:24

## 2018-12-05 RX ADMIN — SUCRALFATE 1 G: 1 TABLET ORAL at 12:06

## 2018-12-05 RX ADMIN — IOPAMIDOL 100 ML: 755 INJECTION, SOLUTION INTRAVENOUS at 19:00

## 2018-12-05 RX ADMIN — SUCRALFATE 1 G: 1 TABLET ORAL at 20:43

## 2018-12-05 RX ADMIN — SODIUM CHLORIDE 8 MG/HR: 900 INJECTION INTRAVENOUS at 09:03

## 2018-12-05 RX ADMIN — HYDROMORPHONE HYDROCHLORIDE 0.5 MG: 1 INJECTION, SOLUTION INTRAMUSCULAR; INTRAVENOUS; SUBCUTANEOUS at 21:40

## 2018-12-05 RX ADMIN — ONDANSETRON 4 MG: 2 SOLUTION INTRAMUSCULAR; INTRAVENOUS at 09:21

## 2018-12-05 RX ADMIN — SUCRALFATE 1 G: 1 TABLET ORAL at 16:38

## 2018-12-05 RX ADMIN — ONDANSETRON 4 MG: 2 SOLUTION INTRAMUSCULAR; INTRAVENOUS at 16:33

## 2018-12-05 RX ADMIN — ONDANSETRON 4 MG: 2 SOLUTION INTRAMUSCULAR; INTRAVENOUS at 23:18

## 2018-12-05 RX ADMIN — SODIUM CHLORIDE 8 MG/HR: 900 INJECTION INTRAVENOUS at 22:45

## 2018-12-05 RX ADMIN — HYDROMORPHONE HYDROCHLORIDE 0.5 MG: 2 INJECTION, SOLUTION INTRAMUSCULAR; INTRAVENOUS; SUBCUTANEOUS at 16:33

## 2018-12-05 RX ADMIN — SODIUM CHLORIDE, PRESERVATIVE FREE 3 ML: 5 INJECTION INTRAVENOUS at 09:07

## 2018-12-05 RX ADMIN — POTASSIUM CHLORIDE 20 MEQ: 400 INJECTION, SOLUTION INTRAVENOUS at 10:52

## 2018-12-05 RX ADMIN — POTASSIUM CHLORIDE AND SODIUM CHLORIDE 75 ML/HR: 900; 150 INJECTION, SOLUTION INTRAVENOUS at 23:19

## 2018-12-05 RX ADMIN — SODIUM CHLORIDE 8 MG/HR: 900 INJECTION INTRAVENOUS at 16:38

## 2018-12-05 RX ADMIN — SUCRALFATE 1 G: 1 TABLET ORAL at 09:08

## 2018-12-05 NOTE — PROGRESS NOTES
"    Patient Name: Lila Pabon  :1955  63 y.o.      Patient Care Team:  Jaskaran Zhang MD as PCP - General (Family Medicine)    Chief Complaint: troponin above ref, abn ecg    Interval History: less abdominal pain, no CP  Echo today  Interpretation Summary     · Left ventricular systolic function is normal. Estimated EF = 54%.  · The following left ventricular wall segments are hypokinetic: mid inferior and basal inferior.  · Left atrial cavity size is mildly dilated.  · Left ventricular diastolic dysfunction (grade II) consistent with pseudonormalization.  · Mild mitral valve regurgitation is present  · Moderate tricuspid valve regurgitation is present.  · Mild pulmonic valve regurgitation is present.  · Calculated right ventricular systolic pressure from tricuspid regurgitation is 80.3 mmHg. Severe pulmonary hypertension is present.            Objective   Vital Signs  Temp:  [97.3 °F (36.3 °C)-99 °F (37.2 °C)] 97.3 °F (36.3 °C)  Heart Rate:  [68-91] 68  Resp:  [16-18] 16  BP: (113-161)/(68-99) 123/75    Intake/Output Summary (Last 24 hours) at 2018 1302  Last data filed at 2018 0900  Gross per 24 hour   Intake 2280 ml   Output --   Net 2280 ml     Flowsheet Rows      First Filed Value   Admission Height  162 cm (63.78\") Documented at 2018 0215   Admission Weight  56.7 kg (125 lb) Documented at 2018 0215          Physical Exam:   General Appearance:    Alert, cooperative, in no acute distress   Lungs:     Clear to auscultation.  Normal respiratory effort and rate.      Heart:    Regular rhythm and normal rate, normal S1 and S2, no murmurs, gallops or rubs.     Chest Wall:    No abnormalities observed   Abdomen:     Soft, nontender, positive bowel sounds.     Extremities:   no cyanosis, clubbing or edema.  No marked joint deformities.  Adequate musculoskeletal strength.       Results Review:    Results from last 7 days   Lab Units  18   0355   SODIUM mmol/L  141 "   POTASSIUM mmol/L  3.2*   CHLORIDE mmol/L  106   CO2 mmol/L  22.9   BUN mg/dL  16   CREATININE mg/dL  0.89   GLUCOSE mg/dL  82   CALCIUM mg/dL  7.9*     Results from last 7 days   Lab Units  12/04/18   0645  12/04/18   0237   TROPONIN T ng/mL  0.102*  0.141*     Results from last 7 days   Lab Units  12/05/18   0355   WBC 10*3/mm3  8.90   HEMOGLOBIN g/dL  10.3*   HEMATOCRIT %  30.7*   PLATELETS 10*3/mm3  144                           Medication Review:     sodium chloride 3 mL Intravenous Q12H   sucralfate 1 g Oral 4x Daily AC & at Bedtime          pantoprazole 8 mg/hr Last Rate: 8 mg/hr (12/05/18 0903)   sodium chloride 0.9 % with KCl 20 mEq 75 mL/hr Last Rate: 75 mL/hr (12/05/18 0902)       Assessment/Plan     Active Hospital Problems    Diagnosis Date Noted   • Esophagitis [K20.9] 06/28/2018   • Type 2 myocardial infarction (CMS/HCC) [I21.A1] 06/27/2018   • Bilious vomiting with nausea [R11.14] 06/26/2018   • Epigastric pain [R10.13] 06/25/2018      Resolved Hospital Problems   No resolved problems to display.     1.  Epigastric discomfort with villous vomiting and nausea-drinking clear liquids now, followed by GI  2.  Troponin above reference-EKG today has demonstrated resolution of her T-wave inversion.  Her echocardiogram demonstrates mild hypokinesis of the inferior wall..  Despite this, with her acute GI issues uncomfortable with anticoagulant or antiplatelet therapy at this point  3.  Pulmonary hypertension-this is a new finding.  She did have mild pulmonary hypertension on her echocardiogram in June, but her pulmonary care pressure today is 80 mmHg which is a dramatic increase.  I would check a chest x-ray as well as a d-dimer.  If the dimer is elevated we will proceed with a CT angiogram  Bam Dunbar III, MD  [facility]  12/05/18  1:02 PM

## 2018-12-05 NOTE — PLAN OF CARE
Problem: Patient Care Overview  Goal: Plan of Care Review  Outcome: Ongoing (interventions implemented as appropriate)   12/05/18 0411   Coping/Psychosocial   Plan of Care Reviewed With patient   Plan of Care Review   Progress no change   OTHER   Outcome Summary Pt. is not tolerating PO intake - has severe epigastric pain with it, zofran and dilaudid given; telemetry is sinus rhythm; IVF infusing; sleeping intermittently       Problem: Nausea/Vomiting (Adult)  Goal: Identify Related Risk Factors and Signs and Symptoms  Outcome: Ongoing (interventions implemented as appropriate)   12/05/18 0411   Nausea/Vomiting (Adult)   Related Risk Factors (Nausea/Vomiting) gastrointestinal dysfunction;gastric irritation   Signs and Symptoms (Nausea/Vomiting) abdominal discomfort/pain;dry mucous membranes;report of emesis     Goal: Symptom Relief  Outcome: Ongoing (interventions implemented as appropriate)   12/05/18 0411   Nausea/Vomiting (Adult)   Symptom Relief making progress toward outcome     Goal: Adequate Hydration  Outcome: Ongoing (interventions implemented as appropriate)   12/05/18 0411   Nausea/Vomiting (Adult)   Adequate Hydration making progress toward outcome

## 2018-12-05 NOTE — CONSULTS
"Patient Care Team:  Jaskaran Zhang MD as PCP - General (Family Medicine)    CHIEF COMPLAINT: N/V/D    HISTORY OF PRESENT ILLNESS:    64 yo Pt of amy with history of Ulcerative esophagitis and presents after 2 days of acute onset N/V/D early in the Am and continued thru the next two days PTA. Had her PPI decreased to Q Day but was doing well until she got sick. Has had residual epigastric pain and has been kept NPO but is thirsty tonight. States she has a detailed \"wean\" of her Protonix??    Has had screening colonoscopy in 2016 in [place].       Past Medical History:   Diagnosis Date   • Anemia     IRON SUPPLEMENTS   • Arthritis    • GERD (gastroesophageal reflux disease)    • History of cardiac cath    • Hypertension    • Non-STEMI (non-ST elevated myocardial infarction) (CMS/[facility]) 2018     Past Surgical History:   Procedure Laterality Date   • CATARACT EXTRACTION Bilateral    • CATARACT EXTRACTION     • GLAUCOMA SURGERY     • HYSTERECTOMY     • SHOULDER SURGERY Right     RUPTURED BICEP TENDON     Family History   Problem Relation Age of Onset   • Breast cancer Mother    • Breast cancer Maternal Aunt      Social History     Tobacco Use   • Smoking status: Former Smoker     Years: 44.00     Types: Cigarettes     Last attempt to quit: 2010     Years since quittin.9   • Smokeless tobacco: Never Used   Substance Use Topics   • Alcohol use: Yes     Comment: Rare   • Drug use: No     Medications Prior to Admission   Medication Sig Dispense Refill Last Dose   • calcium carb-cholecalciferol (CALCIUM 600+D) 600-800 MG-UNIT tablet Take 1 tablet by mouth Every Night.   12/3/2018 at 2100   • estrogens, conjugated, (PREMARIN) 0.45 MG tablet Take 0.45 mg by mouth Every Night. Take daily for 21 days then do not take for 7 days.    12/3/2018 at 2100   • ferrous sulfate 140 (45 Fe) MG tablet controlled-release tablet Take 140 mg by mouth Daily With Breakfast.   12/3/2018 at 2100   • fluticasone " "(FLONASE) 50 MCG/ACT nasal spray 2 sprays into the nostril(s) as directed by provider As Needed for Rhinitis.   12/3/2018 at 0700   • losartan (COZAAR) 25 MG tablet Take 25 mg by mouth Daily.   12/3/2018 at 2100   • pantoprazole (PROTONIX) 20 MG EC tablet 1 pill by mouth every morning before breakfast 4 weeks, then every other day for 2 weeks, then every third day for 2 weeks and then stop. 60 tablet 0 12/3/2018 at 0700   • traZODone (DESYREL) 100 MG tablet Take 100 mg by mouth Every Night.   12/3/2018 at 2100   • amitriptyline (ELAVIL) 25 MG tablet Take 12.5 mg by mouth Every Night.   9/17/2018 at Unknown time     Allergies:  Mobic [meloxicam]    REVIEW OF SYSTEMS:  Please see the above history of present illness for pertinent positives and negatives.  The remainder of the patient's systems have been reviewed and are negative.     Vital Signs  Temp:  [98.1 °F (36.7 °C)-99.4 °F (37.4 °C)] 98.5 °F (36.9 °C)  Heart Rate:  [66-94] 82  Resp:  [16-20] 16  BP: ()/() 113/74    Flowsheet Rows      First Filed Value   Admission Height  162 cm (63.78\") Documented at 12/04/2018 0215   Admission Weight  56.7 kg (125 lb) Documented at 12/04/2018 0215           Physical Exam:  Physical Exam   Constitutional: Patient appears well-developed and well-nourished and in no acute distress   HEENT:   Head: Normocephalic and atraumatic.   Eyes:  Pupils are equal, round, and reactive to light. EOM are intact. Sclerae are anicteric and non-injected.  Mouth and Throat: Patient has moist mucous membranes. Oropharynx is clear of any erythema or exudate.     Neck: Neck supple. No JVD present. No thyromegaly present. No lymphadenopathy present.  Cardiovascular: Regular rate, regular rhythm, S1 normal and S2 normal.  Exam reveals no gallop and no friction rub.  No murmur heard.  Pulmonary/Chest: Lungs are clear to auscultation bilaterally. No respiratory distress. No wheezes. No rhonchi. No rales.   Abdominal: Soft. Bowel sounds are " normal. No distension and no mass. There is no hepatosplenomegaly. There is epigastric tenderness.   Musculoskeletal: Normal Muscle tone  Extremities: No edema. Pulses are palpable in all 4 extremities.  Neurological: Patient is alert and oriented to person, place, and time. Cranial nerves II-XII are grossly intact with no focal deficits.  Skin: Skin is warm. No rash noted. Nails show no clubbing.  No cyanosis or erythema.    Debilities/Disabilities Identified: None  Emotional Behavior: Appropriate     Results Review:    I reviewed the patient's new clinical results.  Lab Results (most recent)     Procedure Component Value Units Date/Time    [facility]bbins Draw [[id number]] Collected:  12/04/18 0237    Specimen:  Blood Updated:  12/04/18 0928    Narrative:       The following orders were created for panel order Stebbins Draw.  Procedure                               Abnormality         Status                     ---------                               -----------         ------                     Light Blue Top[[id number]]                                                              Green Top (Gel)[[id number]]                                  Final result               Lavender Top[[id number]]                                     Final result               Gold Top - SST[[id number]]                                                                Please view results for these tests on the individual orders.    Troponin [[id number]]  (Abnormal) Collected:  12/04/18 0645    Specimen:  Blood Updated:  12/04/18 0807     Troponin T 0.102 ng/mL     Narrative:       Troponin T Reference Ranges:  Less than 0.03 ng/mL:    Negative for AMI  0.03 to 0.09 ng/mL:      Indeterminant for AMI  Greater than 0.09 ng/mL: Positive for AMI    Lactic Acid, Reflex [[id number]]  (Normal) Collected:  12/04/18 0702    Specimen:  Blood Updated:  12/04/18 0736     Lactate 0.8 mmol/L     Lactic Acid, Reflex Timer (This will reflex a repeat order 3-3:15 hours  after ordered.) [[id number]] Collected:  12/04/18 0310    Specimen:  Blood Updated:  12/04/18 0646     Extra Tube Hold for add-ons.     Comment: Auto resulted.       Green Top (Gel) [[id number]] Collected:  12/04/18 0237    Specimen:  Blood Updated:  12/04/18 0345     Extra Tube Hold for add-ons.     Comment: Auto resulted.       Lavender Top [[id number]] Collected:  12/04/18 0237    Specimen:  Blood Updated:  12/04/18 0345     Extra Tube hold for add-on     Comment: Auto resulted       Urinalysis, Microscopic Only - Urine, Clean Catch [[id number]]  (Abnormal) Collected:  12/04/18 0321    Specimen:  Urine, Clean Catch Updated:  12/04/18 0345     RBC, UA 3-5 /HPF      WBC, UA 3-5 /HPF      Bacteria, UA Trace /HPF      Squamous Epithelial Cells, UA 3-6 /HPF      Hyaline Casts, UA None Seen /LPF      Amorphous Crystals, UA Small/1+ /HPF      Methodology Manual Light Microscopy    Urinalysis With Microscopic If Indicated (No Culture) - Urine, Clean Catch [[id number]]  (Abnormal) Collected:  12/04/18 0321    Specimen:  Urine, Clean Catch Updated:  12/04/18 0338     Color, UA Yellow     Appearance, UA Slightly Cloudy     pH, UA 8.5     Specific Gravity, UA 1.020     Glucose, UA Negative     Ketones, UA >=160 mg/dL (4+)     Bilirubin, UA Negative     Blood, UA Trace     Protein, UA Trace     Leuk Esterase, UA Negative     Nitrite, UA Negative     Urobilinogen, UA 0.2 E.U./dL    Lactic Acid, Plasma [[id number]]  (Abnormal) Collected:  12/04/18 0310    Specimen:  Blood Updated:  12/04/18 0335     Lactate 3.4 mmol/L     Troponin [[id number]]  (Abnormal) Collected:  12/04/18 0237    Specimen:  Blood Updated:  12/04/18 0327     Troponin T 0.141 ng/mL     Narrative:       Troponin T Reference Ranges:  Less than 0.03 ng/mL:    Negative for AMI  0.03 to 0.09 ng/mL:      Indeterminant for AMI  Greater than 0.09 ng/mL: Positive for AMI    Comprehensive Metabolic Panel [[id number]]  (Abnormal) Collected:  12/04/18 0237    Specimen:  Blood  Updated:  12/04/18 0305     Glucose 141 mg/dL      BUN 16 mg/dL      Creatinine 1.09 mg/dL      Sodium 138 mmol/L      Potassium 3.8 mmol/L      Chloride 96 mmol/L      CO2 22.5 mmol/L      Calcium 9.7 mg/dL      Total Protein 7.4 g/dL      Albumin 4.50 g/dL      ALT (SGPT) 40 U/L      AST (SGOT) 46 U/L      Alkaline Phosphatase 67 U/L      Total Bilirubin 0.9 mg/dL      eGFR Non African Amer 50 mL/min/1.73      Globulin 2.9 gm/dL      A/G Ratio 1.6 g/dL      BUN/Creatinine Ratio 14.7     Anion Gap 19.5 mmol/L     Lipase [[id number]]  (Abnormal) Collected:  12/04/18 0237    Specimen:  Blood Updated:  12/04/18 0305     Lipase 75 U/L     CBC & Differential [[id number]] Collected:  12/04/18 0237    Specimen:  Blood Updated:  12/04/18 0245    Narrative:       The following orders were created for panel order CBC & Differential.  Procedure                               Abnormality         Status                     ---------                               -----------         ------                     CBC Auto Differential[[id number]]        Abnormal            Final result                 Please view results for these tests on the individual orders.    CBC Auto Differential [[id number]]  (Abnormal) Collected:  12/04/18 0237    Specimen:  Blood Updated:  12/04/18 0245     WBC 13.64 10*3/mm3      RBC 4.18 10*6/mm3      Hemoglobin 12.7 g/dL      Hematocrit 36.7 %      MCV 87.8 fL      MCH 30.4 pg      MCHC 34.6 g/dL      RDW 13.2 %      RDW-SD 42.9 fl      MPV 10.9 fL      Platelets 199 10*3/mm3      Neutrophil % 79.2 %      Lymphocyte % 10.1 %      Monocyte % 9.7 %      Eosinophil % 0.2 %      Basophil % 0.4 %      Immature Grans % 0.4 %      Neutrophils, Absolute 10.79 10*3/mm3      Lymphocytes, Absolute 1.38 10*3/mm3      Monocytes, Absolute 1.32 10*3/mm3      Eosinophils, Absolute 0.03 10*3/mm3      Basophils, Absolute 0.06 10*3/mm3      Immature Grans, Absolute 0.06 10*3/mm3      nRBC 0.0 /100 WBC           Imaging Results  (most recent)     None        reviewed    ECG/EMG Results (most recent)     Procedure Component Value Units Date/Time    ECG 12 Lead [[id number]] Collected:  12/04/18 1015     Updated:  12/04/18 1552    Narrative:       RR Interval= 938 ms  MT Interval= 116 ms  QRSD Interval= 97 ms  QT Interval= 454 ms  QTc Interval= 469 ms  Heart Rate= 64 ms  P Axis= 15 deg  QRS Axis= -47 deg  T Wave Axis= 46 deg  I: 40 Axis= 70 deg  T: 40 Axis= -75 deg  ST Axis= 56 deg  Sinus rhythm  Borderline short MT interval  LAD, consider left anterior fascicular block  RSR' in V1 or V2, right VCD or RVH  T abnormalities, inferior and lateral leads  No Change from Prior Tracing  Electronically Signed by:  Lenore Perdomo ([facility]) 04-Dec-2018 15:52:17  Date and Time of Study: 2018-12-04 10:15:41    ECG 12 Lead [[id number]] Collected:  12/04/18 0821     Updated:  12/04/18 1554    Narrative:       RR Interval= 845 ms  MT Interval= 116 ms  QRSD Interval= 80 ms  QT Interval= 493 ms  QTc Interval= 536 ms  Heart Rate= 71 ms  P Axis= 11 deg  QRS Axis= -31 deg  T Wave Axis= -66 deg  I: 40 Axis= 82 deg  T: 40 Axis= -72 deg  ST Axis= -62 deg  Sinus rhythm  Borderline short MT interval  Left axis deviation  RSR' in V1 or V2, probably normal variant  Abnormal T, consider ischemia, lateral leads  Prolonged QT interval  No Change from Prior Tracing  Electronically Signed by:  Lenore Perdomo ([facility]) 04-Dec-2018 15:52:34  Date and Time of Study: 2018-12-04 08:21:16    ECG 12 Lead [[id number]] Collected:  12/04/18 0255     Updated:  12/04/18 1555    Narrative:       RR Interval= 632 ms  MT Interval= 142 ms  QRSD Interval= 83 ms  QT Interval= 409 ms  QTc Interval= 514 ms  Heart Rate= 95 ms  P Axis= -7 deg  QRS Axis= 115 deg  T Wave Axis= deg  I: 40 Axis= -10 deg  T: 40 Axis= 151 deg  ST Axis= -15 deg  Sinus rhythm  Right axis deviation  Abnormal T, consider ischemia, lateral leads  Prolonged QT interval  c/w prior ecg, lateral t wave inversion now  seen  Electronically Signed by:  Lenore Perdomo ([facility]) 04-Dec-2018 15:53:06  Date and Time of Study: 2018-12-04 02:55:19        reviewed    Assessment/Plan     N/V/D  Epigastric Pain secondary to above  History of Ulcerative Esophagitis and Thrush    Most likely a viral GE that should improve steadily so with No odynophagia ( however didn't have prior to her last scope either)  will increase her diet as tolerated and once she is taking in sig PO will put her back on BID PPI , most likely will not need repeat EGD but will follow for now.    I discussed the patients findings and my recommendations with patient and .     Laureano Rehman MD  12/04/18  7:14 PM    Time: Not recorded

## 2018-12-05 NOTE — PLAN OF CARE
Problem: Patient Care Overview  Goal: Plan of Care Review  Outcome: Ongoing (interventions implemented as appropriate)   12/05/18 1810   Coping/Psychosocial   Plan of Care Reviewed With patient   Plan of Care Review   Progress no change   OTHER   Outcome Summary VSS. Pain controlled with Dilaudid. Tolerating slow sips. Cardiology work up today. Awaiting results of CT Angiogram.     Goal: Individualization and Mutuality  Outcome: Ongoing (interventions implemented as appropriate)    Goal: Discharge Needs Assessment  Outcome: Ongoing (interventions implemented as appropriate)      Problem: Nausea/Vomiting (Adult)  Goal: Identify Related Risk Factors and Signs and Symptoms  Outcome: Outcome(s) achieved Date Met: 12/05/18    Goal: Symptom Relief  Outcome: Ongoing (interventions implemented as appropriate)    Goal: Adequate Hydration  Outcome: Ongoing (interventions implemented as appropriate)

## 2018-12-05 NOTE — PROGRESS NOTES
"Daily Progress Note:      Chief complaint: Follow-up of esophagitis, abdominal pain, elevated troponin    Subjective: Abdominal pain nausea and vomiting improved.  She was started on clear liquids diet last night but when she tried to drink water recurrence of vomiting dry heaves and abdominal pain has not had anymore since then.  She continues to have some abdominal discomfort    Vital Signs  Temp:  [98 °F (36.7 °C)-99 °F (37.2 °C)] 98 °F (36.7 °C)  Heart Rate:  [66-91] 68  Resp:  [16-18] 18  BP: (105-161)/(66-99) 132/81  Oxygen Therapy  SpO2: 93 %  Pulse Oximetry Type: Intermittent  Device (Oxygen Therapy): room air}  Body mass index is 21.8 kg/m².  Flowsheet Rows      First Filed Value   Admission Height  162 cm (63.78\") Documented at 12/04/2018 0215   Admission Weight  56.7 kg (125 lb) Documented at 12/04/2018 0215                   Documented weights    12/04/18 0215 12/04/18 0443   Weight: 56.7 kg (125 lb) 57.6 kg (127 lb)           Patient Vitals for the past 24 hrs:   BP Temp Temp src Pulse Resp SpO2   12/05/18 0615 132/81 98 °F (36.7 °C) Oral 68 18 93 %   12/05/18 0409 122/68 -- -- -- -- --   12/05/18 0319 161/99 98.7 °F (37.1 °C) Oral 81 18 95 %   12/04/18 2314 119/77 99 °F (37.2 °C) Oral 76 18 95 %   12/04/18 1933 158/83 98.9 °F (37.2 °C) Oral 91 18 99 %   12/04/18 1500 113/74 98.5 °F (36.9 °C) Oral 82 16 95 %   12/04/18 1100 105/66 98.1 °F (36.7 °C) Oral 66 16 94 %       57.6 kg (127 lb)      Intake/Output Summary (Last 24 hours) at 12/5/2018 0737  Last data filed at 12/5/2018 0600  Gross per 24 hour   Intake 2040 ml   Output --   Net 2040 ml       Review of Systems   Constitutional: Negative for activity change, appetite change and fatigue.   HENT: Negative for congestion.    Respiratory: Negative for cough, chest tightness, shortness of breath and wheezing.    Cardiovascular: Negative for chest pain.   Gastrointestinal: Positive for abdominal pain and nausea. Negative for abdominal distention, diarrhea " and vomiting.   Endocrine: Negative for polyphagia and polyuria.   Genitourinary: Negative for frequency.   Skin: Negative for rash.   Neurological: Negative for light-headedness.   Hematological: Does not bruise/bleed easily.   Psychiatric/Behavioral: Negative for agitation and behavioral problems.       Physical Exam   Constitutional: She appears well-developed and well-nourished.   HENT:   Head: Normocephalic.   Mouth/Throat: Oropharynx is clear and moist.   Eyes: Conjunctivae are normal.   Neck: Normal range of motion. No JVD present. No thyromegaly present.   Cardiovascular: Normal rate, regular rhythm and normal heart sounds.   No murmur heard.  Pulmonary/Chest: Effort normal and breath sounds normal. No respiratory distress. She has no wheezes. She has no rales.   Abdominal: Soft. Bowel sounds are normal. She exhibits no distension. There is tenderness in the epigastric area. There is no guarding.   Neurological: She is alert.   Skin: Skin is warm and dry. No rash noted.   Nursing note and vitals reviewed.      Medication Review:   I have reviewed the patient's current medication list  Scheduled Meds:  potassium chloride 20 mEq Intravenous Q2H   sodium chloride 3 mL Intravenous Q12H   sucralfate 1 g Oral 4x Daily AC & at Bedtime     Continuous Infusions:  pantoprazole 8 mg/hr Last Rate: 8 mg/hr (12/05/18 0513)   custom IV infusion builder       PRN Meds:.•  acetaminophen  •  bisacodyl  •  calcium carbonate  •  HYDROmorphone  •  magnesium hydroxide  •  ondansetron  •  ondansetron  •  sodium chloride  •  sodium chloride  •  sodium chloride  @medsinfusion@      Labs:  Results from last 7 days   Lab Units  12/05/18   0355  12/04/18   0237   WBC 10*3/mm3  8.90  13.64*   HEMOGLOBIN g/dL  10.3*  12.7   HEMATOCRIT %  30.7*  36.7*   PLATELETS 10*3/mm3  144  199     Results from last 7 days   Lab Units  12/05/18   0355  12/04/18   0237   SODIUM mmol/L  141  138   POTASSIUM mmol/L  3.2*  3.8   CHLORIDE mmol/L  106  96*    CO2 mmol/L  22.9  22.5   BUN mg/dL  16  16   CREATININE mg/dL  0.89  1.09*   CALCIUM mg/dL  7.9*  9.7   BILIRUBIN mg/dL  0.6  0.9   ALK PHOS U/L  65  67   ALT (SGPT) U/L  50*  40*   AST (SGOT) U/L  53*  46*   GLUCOSE mg/dL  82  141*           Lab Results (last 24 hours)     Procedure Component Value Units Date/Time    CBC (No Diff) [[id number]]  (Abnormal) Collected:  12/05/18 0355    Specimen:  Blood Updated:  12/05/18 0535     WBC 8.90 10*3/mm3      RBC 3.35 10*6/mm3      Hemoglobin 10.3 g/dL      Hematocrit 30.7 %      MCV 91.6 fL      MCH 30.7 pg      MCHC 33.6 g/dL      RDW 13.4 %      RDW-SD 45.6 fl      MPV 11.7 fL      Platelets 144 10*3/mm3     Comprehensive Metabolic Panel [[id number]]  (Abnormal) Collected:  12/05/18 0355    Specimen:  Blood Updated:  12/05/18 0533     Glucose 82 mg/dL      BUN 16 mg/dL      Creatinine 0.89 mg/dL      Sodium 141 mmol/L      Potassium 3.2 mmol/L      Chloride 106 mmol/L      CO2 22.9 mmol/L      Calcium 7.9 mg/dL      Total Protein 5.8 g/dL      Albumin 3.40 g/dL      ALT (SGPT) 50 U/L      AST (SGOT) 53 U/L      Alkaline Phosphatase 65 U/L      Total Bilirubin 0.6 mg/dL      eGFR Non African Amer 64 mL/min/1.73      Globulin 2.4 gm/dL      A/G Ratio 1.4 g/dL      BUN/Creatinine Ratio 18.0     Anion Gap 12.1 mmol/L     [place] Draw [[id number]] Collected:  12/04/18 0237    Specimen:  Blood Updated:  12/04/18 0928    Narrative:       The following orders were created for panel order [place] Draw.  Procedure                               Abnormality         Status                     ---------                               -----------         ------                     Light Blue Top[[id number]]                                                              Green Top (Gel)[[id number]]                                  Final result               Lavender Top[[id number]]                                     Final result               Gold Top - SST[[id number]]                                                                 Please view results for these tests on the individual orders.    Troponin [[id number]]  (Abnormal) Collected:  12/04/18 0645    Specimen:  Blood Updated:  12/04/18 0807     Troponin T 0.102 ng/mL     Narrative:       Troponin T Reference Ranges:  Less than 0.03 ng/mL:    Negative for AMI  0.03 to 0.09 ng/mL:      Indeterminant for AMI  Greater than 0.09 ng/mL: Positive for AMI        Results from last 7 days   Lab Units  12/04/18   0645  12/04/18   0237   TROPONIN T ng/mL  0.102*  0.141*                           Invalid input(s): LDLCALC          No results found for: POCGLU  Results from last 7 days   Lab Units  12/04/18   0702  12/04/18   0310   LACTATE mmol/L  0.8  3.4*         Results from last 7 days   Lab Units  12/04/18   0321   NITRITE UA   Negative   WBC UA /HPF  3-5*   BACTERIA UA /HPF  Trace*   SQUAM EPITHEL UA /HPF  3-6*             Radiology:  Imaging Results (last 24 hours)     ** No results found for the last 24 hours. **          Cardiology:  ECG/EMG Results (last 24 hours)     Procedure Component Value Units Date/Time    ECG 12 Lead [[id number]] Collected:  12/04/18 1015     Updated:  12/04/18 1552    Narrative:       RR Interval= 938 ms  PA Interval= 116 ms  QRSD Interval= 97 ms  QT Interval= 454 ms  QTc Interval= 469 ms  Heart Rate= 64 ms  P Axis= 15 deg  QRS Axis= -47 deg  T Wave Axis= 46 deg  I: 40 Axis= 70 deg  T: 40 Axis= -75 deg  ST Axis= 56 deg  Sinus rhythm  Borderline short PA interval  LAD, consider left anterior fascicular block  RSR' in V1 or V2, right VCD or RVH  T abnormalities, inferior and lateral leads  No Change from Prior Tracing  Electronically Signed by:  [facility] 04-Dec-2018 15:52:17  Date and Time of Study: 2018-12-04 10:15:41    ECG 12 Lead [[id number]] Collected:  12/04/18 0821     Updated:  12/04/18 1554    Narrative:       RR Interval= 845 ms  PA Interval= 116 ms  QRSD Interval= 80 ms  QT Interval= 493 ms  QTc Interval=  536 ms  Heart Rate= 71 ms  P Axis= 11 deg  QRS Axis= -31 deg  T Wave Axis= -66 deg  I: 40 Axis= 82 deg  T: 40 Axis= -72 deg  ST Axis= -62 deg  Sinus rhythm  Borderline short MD interval  Left axis deviation  RSR' in V1 or V2, probably normal variant  Abnormal T, consider ischemia, lateral leads  Prolonged QT interval  No Change from Prior Tracing  Electronically Signed by:  Lenore Perdomo ([facility]) 04-Dec-2018 15:52:34  Date and Time of Study: 2018-12-04 08:21:16    ECG 12 Lead [[id number]] Collected:  12/04/18 0255     Updated:  12/04/18 1555    Narrative:       RR Interval= 632 ms  MD Interval= 142 ms  QRSD Interval= 83 ms  QT Interval= 409 ms  QTc Interval= 514 ms  Heart Rate= 95 ms  P Axis= -7 deg  QRS Axis= 115 deg  T Wave Axis= deg  I: 40 Axis= -10 deg  T: 40 Axis= 151 deg  ST Axis= -15 deg  Sinus rhythm  Right axis deviation  Abnormal T, consider ischemia, lateral leads  Prolonged QT interval  c/w prior ecg, lateral t wave inversion now seen  Electronically Signed by:  Lenore Perdomo ([facility]) 04-Dec-2018 15:53:06  Date and Time of Study: 2018-12-04 02:55:19          I have reviewed recent labs results and consult notes.  Parts of this note may have been copied and pasted but patient was examined and interviewed by me today    Assessment and Plan:  1.  Recurrent esophagitis continue IV PPI infusion and IV fluids.  She had worsening symptoms with clear liquids last night we'll continue present treatment until GI sees patient again today     2.  Elevated troponin likely recurrent type II myocardial infarction.  echocardiogram pending     3.  Hypertension controlled nothing acute     4.   hypokalemia potassium infusions ordered      5.  DVT prophylaxis SCDs  ordered

## 2018-12-06 LAB
ANION GAP SERPL CALCULATED.3IONS-SCNC: 11.5 MMOL/L
BUN BLD-MCNC: 12 MG/DL (ref 8–23)
BUN/CREAT SERPL: 13.5 (ref 7–25)
CALCIUM SPEC-SCNC: 8 MG/DL (ref 8.8–10.5)
CHLORIDE SERPL-SCNC: 110 MMOL/L (ref 98–107)
CO2 SERPL-SCNC: 19.5 MMOL/L (ref 22–29)
CREAT BLD-MCNC: 0.89 MG/DL (ref 0.57–1)
DEPRECATED RDW RBC AUTO: 44.7 FL (ref 37–54)
ERYTHROCYTE [DISTWIDTH] IN BLOOD BY AUTOMATED COUNT: 13.4 % (ref 11.5–14.5)
GFR SERPL CREATININE-BSD FRML MDRD: 64 ML/MIN/1.73
GLUCOSE BLD-MCNC: 87 MG/DL (ref 65–99)
HCT VFR BLD AUTO: 29.4 % (ref 37–47)
HGB BLD-MCNC: 9.7 G/DL (ref 12–16)
MCH RBC QN AUTO: 30 PG (ref 27–31)
MCHC RBC AUTO-ENTMCNC: 33 G/DL (ref 31–37)
MCV RBC AUTO: 91 FL (ref 81–99)
NT-PROBNP SERPL-MCNC: 8147 PG/ML (ref 5–125)
PLATELET # BLD AUTO: 151 10*3/MM3 (ref 140–500)
PMV BLD AUTO: 11.7 FL (ref 7.4–10.4)
POTASSIUM BLD-SCNC: 3.6 MMOL/L (ref 3.5–5.2)
RBC # BLD AUTO: 3.23 10*6/MM3 (ref 4.2–5.4)
SODIUM BLD-SCNC: 141 MMOL/L (ref 136–145)
WBC NRBC COR # BLD: 7.8 10*3/MM3 (ref 4.8–10.8)

## 2018-12-06 PROCEDURE — 99232 SBSQ HOSP IP/OBS MODERATE 35: CPT | Performed by: INTERNAL MEDICINE

## 2018-12-06 PROCEDURE — 85027 COMPLETE CBC AUTOMATED: CPT | Performed by: FAMILY MEDICINE

## 2018-12-06 PROCEDURE — 80048 BASIC METABOLIC PNL TOTAL CA: CPT | Performed by: FAMILY MEDICINE

## 2018-12-06 PROCEDURE — 83880 ASSAY OF NATRIURETIC PEPTIDE: CPT | Performed by: FAMILY MEDICINE

## 2018-12-06 PROCEDURE — 99233 SBSQ HOSP IP/OBS HIGH 50: CPT | Performed by: INTERNAL MEDICINE

## 2018-12-06 RX ORDER — BUMETANIDE 0.25 MG/ML
2 INJECTION INTRAMUSCULAR; INTRAVENOUS ONCE
Status: COMPLETED | OUTPATIENT
Start: 2018-12-06 | End: 2018-12-06

## 2018-12-06 RX ADMIN — SODIUM CHLORIDE 8 MG/HR: 900 INJECTION INTRAVENOUS at 04:12

## 2018-12-06 RX ADMIN — SUCRALFATE 1 G: 1 TABLET ORAL at 10:50

## 2018-12-06 RX ADMIN — BUMETANIDE 2 MG: 0.25 INJECTION INTRAMUSCULAR; INTRAVENOUS at 08:31

## 2018-12-06 RX ADMIN — SODIUM CHLORIDE, PRESERVATIVE FREE 10 ML: 5 INJECTION INTRAVENOUS at 20:22

## 2018-12-06 RX ADMIN — SODIUM CHLORIDE 8 MG/HR: 900 INJECTION INTRAVENOUS at 14:29

## 2018-12-06 RX ADMIN — SUCRALFATE 1 G: 1 TABLET ORAL at 07:50

## 2018-12-06 RX ADMIN — ACETAMINOPHEN 650 MG: 325 TABLET, FILM COATED ORAL at 02:51

## 2018-12-06 RX ADMIN — SUCRALFATE 1 G: 1 TABLET ORAL at 20:21

## 2018-12-06 RX ADMIN — SODIUM CHLORIDE 8 MG/HR: 900 INJECTION INTRAVENOUS at 09:27

## 2018-12-06 RX ADMIN — SUCRALFATE 1 G: 1 TABLET ORAL at 17:30

## 2018-12-06 NOTE — PROGRESS NOTES
"    Patient Name: Lila Pabon  :1955  63 y.o.      Patient Care Team:  Jaskaran Zhang MD as PCP - General (Family Medicine)    Chief Complaint: troponin above ref, abn ecg    Interval History: no CP, tolerated minimal fluids yesterday, still cramping abdominal pain  Echo today  Interpretation Summary     · Left ventricular systolic function is normal. Estimated EF = 54%.  · The following left ventricular wall segments are hypokinetic: mid inferior and basal inferior.  · Left atrial cavity size is mildly dilated.  · Left ventricular diastolic dysfunction (grade II) consistent with pseudonormalization.  · Mild mitral valve regurgitation is present  · Moderate tricuspid valve regurgitation is present.  · Mild pulmonic valve regurgitation is present.  · Calculated right ventricular systolic pressure from tricuspid regurgitation is 80.3 mmHg. Severe pulmonary hypertension is present.            Objective   Vital Signs  Temp:  [97.3 °F (36.3 °C)-98.4 °F (36.9 °C)] 98.3 °F (36.8 °C)  Heart Rate:  [68-78] 72  Resp:  [16-18] 18  BP: (115-127)/(72-82) 115/75    Intake/Output Summary (Last 24 hours) at 2018 0747  Last data filed at 2018 0413  Gross per 24 hour   Intake 1395 ml   Output 1850 ml   Net -455 ml     Flowsheet Rows      First Filed Value   Admission Height  162 cm (63.78\") Documented at 2018 0215   Admission Weight  56.7 kg (125 lb) Documented at 2018 0215          Physical Exam:   General Appearance:    Alert, cooperative, in no acute distress   Lungs:     Clear to auscultation.  Normal respiratory effort and rate.      Heart:    Regular rhythm and normal rate, normal S1 and S2, no murmurs, gallops or rubs.     Chest Wall:    No abnormalities observed   Abdomen:     Soft, nontender, positive bowel sounds.     Extremities:   no cyanosis, clubbing or edema.  No marked joint deformities.  Adequate musculoskeletal strength.       Results Review:    Results from last 7 days "   Lab Units  12/06/18   0338   SODIUM mmol/L  141   POTASSIUM mmol/L  3.6   CHLORIDE mmol/L  110*   CO2 mmol/L  19.5*   BUN mg/dL  12   CREATININE mg/dL  0.89   GLUCOSE mg/dL  87   CALCIUM mg/dL  8.0*     Results from last 7 days   Lab Units  12/04/18   0645  12/04/18   0237   TROPONIN T ng/mL  0.102*  0.141*     Results from last 7 days   Lab Units  12/06/18   0338   WBC 10*3/mm3  7.80   HEMOGLOBIN g/dL  9.7*   HEMATOCRIT %  29.4*   PLATELETS 10*3/mm3  151                           Medication Review:     sodium chloride 3 mL Intravenous Q12H   sucralfate 1 g Oral 4x Daily AC & at Bedtime          pantoprazole 8 mg/hr Last Rate: 8 mg/hr (12/06/18 0412)   sodium chloride 0.9 % with KCl 20 mEq 75 mL/hr Last Rate: 75 mL/hr (12/05/18 6336)       Assessment/Plan     Active Hospital Problems    Diagnosis Date Noted   • Esophagitis [K20.9] 06/28/2018   • Type 2 myocardial infarction (CMS/HCC) [I21.A1] 06/27/2018   • Bilious vomiting with nausea [R11.14] 06/26/2018   • Epigastric pain [R10.13] 06/25/2018      Resolved Hospital Problems   No resolved problems to display.     1.  Epigastric discomfort with billous vomiting and nausea-drinking minimal clear liquids now, followed by GI  2.  Troponin above reference-EKG today has demonstrated resolution of her T-wave inversion.  Her echocardiogram demonstrates mild hypokinesis of the inferior wall..  No symptoms of instability. Will want to proceed with heart cath (L&RHC) once medical status allows.  Would start antiplatelet (ASA or Plavix) if OK with GI. Note that anticoagulation can be used if needed; I don't think this is necessary at this time  3.  Pulmonary hypertension-this is a new finding.  She did have mild pulmonary hypertension on her echocardiogram in June, but her pulmonary care pressure today is 80 mmHg which is a dramatic increase.  CT angio shows no PE. Will check with [facility] when perform her cath    Bam Dunbar III, MD  [facility]  12/06/18  7:47 AM

## 2018-12-06 NOTE — PLAN OF CARE
Problem: Patient Care Overview  Goal: Plan of Care Review  Outcome: Ongoing (interventions implemented as appropriate)   12/06/18 0528   Coping/Psychosocial   Plan of Care Reviewed With patient   Plan of Care Review   Progress no change   OTHER   Outcome Summary VSS, pain controlled with pain medication, CT angiogram preliminary report, reported to cardiology, new orders noted, continues on telemetry running nsr, will continue to monitor     Goal: Individualization and Mutuality  Outcome: Ongoing (interventions implemented as appropriate)   12/06/18 0528   Individualization   Patient Specific Preferences none voiced       Problem: Nausea/Vomiting (Adult)  Goal: Symptom Relief  Outcome: Ongoing (interventions implemented as appropriate)   12/06/18 0528   Nausea/Vomiting (Adult)   Symptom Relief making progress toward outcome     Goal: Adequate Hydration  Outcome: Ongoing (interventions implemented as appropriate)   12/06/18 0528   Nausea/Vomiting (Adult)   Adequate Hydration making progress toward outcome       Problem: Pain, Acute (Adult)  Goal: Identify Related Risk Factors and Signs and Symptoms  Outcome: Ongoing (interventions implemented as appropriate)   12/06/18 0528   Pain, Acute (Adult)   Related Risk Factors (Acute Pain) persistent pain   Signs and Symptoms (Acute Pain) facial mask of pain/grimace;verbalization of pain descriptors     Goal: Acceptable Pain Control/Comfort Level  Outcome: Ongoing (interventions implemented as appropriate)   12/06/18 0528   Pain, Acute (Adult)   Acceptable Pain Control/Comfort Level making progress toward outcome

## 2018-12-06 NOTE — PROGRESS NOTES
GI Daily Progress Note    Assessment/Plan:      Epigastric pain    Bilious vomiting with nausea    Type 2 myocardial infarction (CMS/HCC)    Esophagitis       LOS: 2 days     Lila Pabon is a 63 y.o. female who was admitted with N/V/D. She reports the symptoms are improving with treatment. Still with abd pain and but no vomiting still concerned regarding eating/drinking.     Subjective:    Patient expresses abdominal pain  Patient denies vomiting, difficulty swallowing and loss of appetite    Objective:    Vital signs in last 24 hours:  Temp:  [97.7 °F (36.5 °C)-98.8 °F (37.1 °C)] 98.8 °F (37.1 °C)  Heart Rate:  [69-78] 69  Resp:  [16-18] 18  BP: (115-127)/(72-85) 120/85    Intake/Output last 3 shifts:  I/O last 3 completed shifts:  In: 2465 [P.O.:600; I.V.:1625; IV Piggyback:240]  Out: 1850 [Urine:1850]  Intake/Output this shift:  I/O this shift:  In: 1267.5 [P.O.:600; I.V.:667.5]  Out: 4800 [Urine:4800]    Physical Exam:Abdomen  Sounds Normal Active Bowel Sounds   Distension Soft   Tenderness Moderately Tender     Imaging Results (last 72 hours)     Procedure Component Value Units Date/Time    CT Angiogram Chest With & Without Contrast [[id number]] Collected:  12/06/18 0710     Updated:  12/06/18 0715    Narrative:       CT CHEST WITH CONTRAST, PE PROTOCOL, 12/5/2018     HISTORY:  63-year-old female hospital inpatient with nausea, vomiting and  abdominal pain. Elevated troponin and d-dimer levels.     TECHNIQUE:    CT examination of the chest with IV contrast. CTA MIP multiplanar  pulmonary artery images were reformatted with 3-D postprocessing.  Radiation dose reduction techniques included automated exposure control.  Radiation audit for CT and nuclear cardiology exams in the last 12  months: 2.      COMPARISON:  *  CT chest, 4/13/2018.     FINDINGS:    Normal pulmonary arteries. No evidence of pulmonary. Normal caliber  thoracic aorta. No pericardial effusion.     Mild diffuse interstitial pulmonary edema with  tiny bilateral pleural  effusions and mild dependent posterior lung base atelectasis.      Trace ascites adjacent to the liver.       Impression:       1. Mild diffuse interstitial pulmonary edema and tiny bilateral pleural  effusions. No pericardial effusion.  2. No evidence of pulmonary embolism or other acute vascular abnormality  within the chest.  3. Initial stat report to the ordering service from Dr. Spencer Livingston  at 2113 hours on 12/5/2018.     This report was finalized on 12/6/2018 7:13 AM by Dr. Dudley Garcia MD.       XR Chest 2 View [[id number]] Collected:  12/05/18 1500     Updated:  12/05/18 1507    Narrative:       CHEST X-RAY, 12/5/2018         HISTORY:  63-year-old female recently admitted to the hospital with nausea and  bilious vomiting. Elevated troponins. Echo showing pulmonary artery  hypertension. Former 44 year smoker, quit eight years ago.     TECHNIQUE:  PA and lateral upright chest series.     FINDINGS:  Generalized pulmonary hyperinflation likely reflects COPD.     There are tiny bilateral posterior pleural effusions lung with a tiny  amount of fluid within the pleural fissures, and peripheral bibasilar  Kerley B lines suggest mild interstitial edema. There is no airspace  edema, focal lung consolidation or additional pulmonary abnormality.  Heart size is normal. The central pulmonary arteries do not appear  enlarged.       Impression:       1. COPD.  2. Minimal bibasilar interstitial edema and tiny pleural effusions.  3. Heart size normal.     This report was finalized on 12/5/2018 3:05 PM by Dr. Dudley Garcia MD.             WBC   Date Value Ref Range Status   12/06/2018 7.80 4.80 - 10.80 10*3/mm3 Final     RBC   Date Value Ref Range Status   12/06/2018 3.23 (L) 4.20 - 5.40 10*6/mm3 Final     Hemoglobin   Date Value Ref Range Status   12/06/2018 9.7 (L) 12.0 - 16.0 g/dL Final     Hematocrit   Date Value Ref Range Status   12/06/2018 29.4 (L) 37.0 - 47.0 % Final     MCV   Date  Value Ref Range Status   12/06/2018 91.0 81.0 - 99.0 fL Final     MCH   Date Value Ref Range Status   12/06/2018 30.0 27.0 - 31.0 pg Final     MCHC   Date Value Ref Range Status   12/06/2018 33.0 31.0 - 37.0 g/dL Final     RDW   Date Value Ref Range Status   12/06/2018 13.4 11.5 - 14.5 % Final     RDW-SD   Date Value Ref Range Status   12/06/2018 44.7 37.0 - 54.0 fl Final     MPV   Date Value Ref Range Status   12/06/2018 11.7 (H) 7.4 - 10.4 fL Final     Platelets   Date Value Ref Range Status   12/06/2018 151 140 - 500 10*3/mm3 Final     Neutrophil %   Date Value Ref Range Status   12/04/2018 79.2 (H) 45.0 - 70.0 % Final     Lymphocyte %   Date Value Ref Range Status   12/04/2018 10.1 (L) 20.0 - 45.0 % Final     Monocyte %   Date Value Ref Range Status   12/04/2018 9.7 (H) 3.0 - 8.0 % Final     Eosinophil %   Date Value Ref Range Status   12/04/2018 0.2 0.0 - 4.0 % Final     Basophil %   Date Value Ref Range Status   12/04/2018 0.4 0.0 - 2.0 % Final     Immature Grans %   Date Value Ref Range Status   12/04/2018 0.4 0.0 - 0.5 % Final     Neutrophils, Absolute   Date Value Ref Range Status   12/04/2018 10.79 (H) 1.50 - 8.30 10*3/mm3 Final     Lymphocytes, Absolute   Date Value Ref Range Status   12/04/2018 1.38 0.60 - 4.80 10*3/mm3 Final     Monocytes, Absolute   Date Value Ref Range Status   12/04/2018 1.32 (H) 0.00 - 1.00 10*3/mm3 Final     Eosinophils, Absolute   Date Value Ref Range Status   12/04/2018 0.03 (L) 0.10 - 0.30 10*3/mm3 Final     Basophils, Absolute   Date Value Ref Range Status   12/04/2018 0.06 0.00 - 0.20 10*3/mm3 Final     Immature Grans, Absolute   Date Value Ref Range Status   12/04/2018 0.06 (H) 0.00 - 0.03 10*3/mm3 Final     nRBC   Date Value Ref Range Status   12/04/2018 0.0 0.0 - 0.0 /100 WBC Final       Glucose   Date Value Ref Range Status   12/06/2018 87 65 - 99 mg/dL Final     Sodium   Date Value Ref Range Status   12/06/2018 141 136 - 145 mmol/L Final     Potassium   Date Value Ref  Range Status   12/06/2018 3.6 3.5 - 5.2 mmol/L Final     CO2   Date Value Ref Range Status   12/06/2018 19.5 (L) 22.0 - 29.0 mmol/L Final     Chloride   Date Value Ref Range Status   12/06/2018 110 (H) 98 - 107 mmol/L Final     Anion Gap   Date Value Ref Range Status   12/06/2018 11.5 mmol/L Final     Creatinine   Date Value Ref Range Status   12/06/2018 0.89 0.57 - 1.00 mg/dL Final     BUN   Date Value Ref Range Status   12/06/2018 12 8 - 23 mg/dL Final     BUN/Creatinine Ratio   Date Value Ref Range Status   12/06/2018 13.5 7.0 - 25.0 Final     Calcium   Date Value Ref Range Status   12/06/2018 8.0 (L) 8.8 - 10.5 mg/dL Final     eGFR Non  Amer   Date Value Ref Range Status   12/06/2018 64 >60 mL/min/1.73 Final     Alkaline Phosphatase   Date Value Ref Range Status   12/05/2018 65 40 - 129 U/L Final     Total Protein   Date Value Ref Range Status   12/05/2018 5.8 (L) 6.0 - 8.5 g/dL Final     ALT (SGPT)   Date Value Ref Range Status   12/05/2018 50 (H) 5 - 33 U/L Final     AST (SGOT)   Date Value Ref Range Status   12/05/2018 53 (H) 5 - 32 U/L Final     Total Bilirubin   Date Value Ref Range Status   12/05/2018 0.6 0.2 - 1.2 mg/dL Final     Albumin   Date Value Ref Range Status   12/05/2018 3.40 (L) 3.50 - 5.20 g/dL Final     Globulin   Date Value Ref Range Status   12/05/2018 2.4 gm/dL Final        N/V/D  Epigastric Pain secondary to above  History of Ulcerative Esophagitis and Thrush  Pulmonary HTN/ DDIMER  Troponin    Good discussion with Dr Dunbar today and he is concerned with Pt's active GI issues and proceding to L and R heart cath. I also feel Pt symptoms and Endoscopic findings do not always match up so despite two earlier EGDs will schedule an endoscopy for tomorrow. Clears till 1000 then NPO for EGD tomorrow around 1500

## 2018-12-06 NOTE — PLAN OF CARE
Problem: Patient Care Overview  Goal: Plan of Care Review  Outcome: Ongoing (interventions implemented as appropriate)   12/06/18 1802   Coping/Psychosocial   Plan of Care Reviewed With patient   Plan of Care Review   Progress improving   OTHER   Outcome Summary patient without complain this shift. Patient increased urinary output second to bumex administration, patient reports no shortness of breath and maintains sats on room air. patient to be NPO at 1000. Planned to be scoped in afternoon tomorrow     Goal: Individualization and Mutuality  Outcome: Ongoing (interventions implemented as appropriate)      Problem: Nausea/Vomiting (Adult)  Goal: Symptom Relief  Outcome: Ongoing (interventions implemented as appropriate)   12/06/18 1802   Nausea/Vomiting (Adult)   Symptom Relief making progress toward outcome     Goal: Adequate Hydration  Outcome: Ongoing (interventions implemented as appropriate)   12/06/18 1802   Nausea/Vomiting (Adult)   Adequate Hydration making progress toward outcome       Problem: Pain, Acute (Adult)  Goal: Identify Related Risk Factors and Signs and Symptoms  Outcome: Ongoing (interventions implemented as appropriate)   12/06/18 0528   Pain, Acute (Adult)   Related Risk Factors (Acute Pain) persistent pain   Signs and Symptoms (Acute Pain) facial mask of pain/grimace;verbalization of pain descriptors     Goal: Acceptable Pain Control/Comfort Level  Outcome: Ongoing (interventions implemented as appropriate)   12/06/18 1802   Pain, Acute (Adult)   Acceptable Pain Control/Comfort Level making progress toward outcome

## 2018-12-06 NOTE — PROGRESS NOTES
GI Daily Progress Note    Assessment/Plan:      Epigastric pain    Bilious vomiting with nausea    Type 2 myocardial infarction (CMS/HCC)    Esophagitis       LOS: 1 day     Lila Pabon is a 63 y.o. female who was admitted with N/V/D. She reports the symptoms are improving with treatment. Cardiology has seen with question of Pulmonary htn and possible PE-CTA pending. She is tolerating clears while on her BID PPI and history of Esophagitis.    Subjective:    Patient expresses vomiting  Patient denies abdominal pain, diarrhea and bloody stools    Objective:    Vital signs in last 24 hours:  Temp:  [97.3 °F (36.3 °C)-99 °F (37.2 °C)] 97.3 °F (36.3 °C)  Heart Rate:  [68-91] 68  Resp:  [16-18] 16  BP: (119-161)/(68-99) 123/75    Intake/Output last 3 shifts:  I/O last 3 completed shifts:  In: 2520 [P.O.:480; I.V.:1800; IV Piggyback:240]  Out: -   Intake/Output this shift:  No intake/output data recorded.    Physical Exam:Abdomen  Sounds Normal Active Bowel Sounds   Distension Soft   Tenderness Mildly Tender     Imaging Results (last 72 hours)     Procedure Component Value Units Date/Time    CT Angiogram Chest With & Without Contrast [[id number]] Updated:  12/05/18 1813    XR Chest 2 View [[id number]] Collected:  12/05/18 1500     Updated:  12/05/18 1507    Narrative:       CHEST X-RAY, 12/5/2018         HISTORY:  63-year-old female recently admitted to the hospital with nausea and  bilious vomiting. Elevated troponins. Echo showing pulmonary artery  hypertension. Former 44 year smoker, quit eight years ago.     TECHNIQUE:  PA and lateral upright chest series.     FINDINGS:  Generalized pulmonary hyperinflation likely reflects COPD.     There are tiny bilateral posterior pleural effusions lung with a tiny  amount of fluid within the pleural fissures, and peripheral bibasilar  Kerley B lines suggest mild interstitial edema. There is no airspace  edema, focal lung consolidation or additional pulmonary abnormality.  Heart  size is normal. The central pulmonary arteries do not appear  enlarged.       Impression:       1. COPD.  2. Minimal bibasilar interstitial edema and tiny pleural effusions.  3. Heart size normal.     This report was finalized on 12/5/2018 3:05 PM by Dr. Dudley Garcia MD.             WBC   Date Value Ref Range Status   12/05/2018 8.90 4.80 - 10.80 10*3/mm3 Final     RBC   Date Value Ref Range Status   12/05/2018 3.35 (L) 4.20 - 5.40 10*6/mm3 Final     Hemoglobin   Date Value Ref Range Status   12/05/2018 10.3 (L) 12.0 - 16.0 g/dL Final     Hematocrit   Date Value Ref Range Status   12/05/2018 30.7 (L) 37.0 - 47.0 % Final     MCV   Date Value Ref Range Status   12/05/2018 91.6 81.0 - 99.0 fL Final     MCH   Date Value Ref Range Status   12/05/2018 30.7 27.0 - 31.0 pg Final     MCHC   Date Value Ref Range Status   12/05/2018 33.6 31.0 - 37.0 g/dL Final     RDW   Date Value Ref Range Status   12/05/2018 13.4 11.5 - 14.5 % Final     RDW-SD   Date Value Ref Range Status   12/05/2018 45.6 37.0 - 54.0 fl Final     MPV   Date Value Ref Range Status   12/05/2018 11.7 (H) 7.4 - 10.4 fL Final     Platelets   Date Value Ref Range Status   12/05/2018 144 140 - 500 10*3/mm3 Final     Neutrophil %   Date Value Ref Range Status   12/04/2018 79.2 (H) 45.0 - 70.0 % Final     Lymphocyte %   Date Value Ref Range Status   12/04/2018 10.1 (L) 20.0 - 45.0 % Final     Monocyte %   Date Value Ref Range Status   12/04/2018 9.7 (H) 3.0 - 8.0 % Final     Eosinophil %   Date Value Ref Range Status   12/04/2018 0.2 0.0 - 4.0 % Final     Basophil %   Date Value Ref Range Status   12/04/2018 0.4 0.0 - 2.0 % Final     Immature Grans %   Date Value Ref Range Status   12/04/2018 0.4 0.0 - 0.5 % Final     Neutrophils, Absolute   Date Value Ref Range Status   12/04/2018 10.79 (H) 1.50 - 8.30 10*3/mm3 Final     Lymphocytes, Absolute   Date Value Ref Range Status   12/04/2018 1.38 0.60 - 4.80 10*3/mm3 Final     Monocytes, Absolute   Date Value Ref  Range Status   12/04/2018 1.32 (H) 0.00 - 1.00 10*3/mm3 Final     Eosinophils, Absolute   Date Value Ref Range Status   12/04/2018 0.03 (L) 0.10 - 0.30 10*3/mm3 Final     Basophils, Absolute   Date Value Ref Range Status   12/04/2018 0.06 0.00 - 0.20 10*3/mm3 Final     Immature Grans, Absolute   Date Value Ref Range Status   12/04/2018 0.06 (H) 0.00 - 0.03 10*3/mm3 Final     nRBC   Date Value Ref Range Status   12/04/2018 0.0 0.0 - 0.0 /100 WBC Final       Glucose   Date Value Ref Range Status   12/05/2018 82 65 - 99 mg/dL Final     Sodium   Date Value Ref Range Status   12/05/2018 141 136 - 145 mmol/L Final     Potassium   Date Value Ref Range Status   12/05/2018 3.2 (L) 3.5 - 5.2 mmol/L Final     CO2   Date Value Ref Range Status   12/05/2018 22.9 22.0 - 29.0 mmol/L Final     Chloride   Date Value Ref Range Status   12/05/2018 106 98 - 107 mmol/L Final     Anion Gap   Date Value Ref Range Status   12/05/2018 12.1 mmol/L Final     Creatinine   Date Value Ref Range Status   12/05/2018 0.89 0.57 - 1.00 mg/dL Final     BUN   Date Value Ref Range Status   12/05/2018 16 8 - 23 mg/dL Final     BUN/Creatinine Ratio   Date Value Ref Range Status   12/05/2018 18.0 7.0 - 25.0 Final     Calcium   Date Value Ref Range Status   12/05/2018 7.9 (L) 8.8 - 10.5 mg/dL Final     eGFR Non  Amer   Date Value Ref Range Status   12/05/2018 64 >60 mL/min/1.73 Final     Alkaline Phosphatase   Date Value Ref Range Status   12/05/2018 65 40 - 129 U/L Final     Total Protein   Date Value Ref Range Status   12/05/2018 5.8 (L) 6.0 - 8.5 g/dL Final     ALT (SGPT)   Date Value Ref Range Status   12/05/2018 50 (H) 5 - 33 U/L Final     AST (SGOT)   Date Value Ref Range Status   12/05/2018 53 (H) 5 - 32 U/L Final     Total Bilirubin   Date Value Ref Range Status   12/05/2018 0.6 0.2 - 1.2 mg/dL Final     Albumin   Date Value Ref Range Status   12/05/2018 3.40 (L) 3.50 - 5.20 g/dL Final     Globulin   Date Value Ref Range Status   12/05/2018  2.4 gm/dL Final        N/V/D  Epigastric Pain secondary to above  History of Ulcerative Esophagitis and Thrush  Pulmonary HTN/ DDIMER       Swallowing better w/o vomiting, so s/w improved but w/o any bleeding this episode or her last one this summer for which she was scoped twice would NOT withhold AC if needed. Discussed with Pt ,  and Nurse.

## 2018-12-07 ENCOUNTER — ANESTHESIA EVENT (OUTPATIENT)
Dept: PERIOP | Facility: HOSPITAL | Age: 63
End: 2018-12-07

## 2018-12-07 ENCOUNTER — ANESTHESIA (OUTPATIENT)
Dept: PERIOP | Facility: HOSPITAL | Age: 63
End: 2018-12-07

## 2018-12-07 LAB
ANION GAP SERPL CALCULATED.3IONS-SCNC: 9.6 MMOL/L
BUN BLD-MCNC: 10 MG/DL (ref 8–23)
BUN/CREAT SERPL: 11 (ref 7–25)
CALCIUM SPEC-SCNC: 8.7 MG/DL (ref 8.8–10.5)
CHLORIDE SERPL-SCNC: 104 MMOL/L (ref 98–107)
CO2 SERPL-SCNC: 28.4 MMOL/L (ref 22–29)
CREAT BLD-MCNC: 0.91 MG/DL (ref 0.57–1)
GFR SERPL CREATININE-BSD FRML MDRD: 62 ML/MIN/1.73
GLUCOSE BLD-MCNC: 91 MG/DL (ref 65–99)
POTASSIUM BLD-SCNC: 3.8 MMOL/L (ref 3.5–5.2)
SODIUM BLD-SCNC: 142 MMOL/L (ref 136–145)

## 2018-12-07 PROCEDURE — 88305 TISSUE EXAM BY PATHOLOGIST: CPT | Performed by: INTERNAL MEDICINE

## 2018-12-07 PROCEDURE — 25010000002 ONDANSETRON PER 1 MG: Performed by: FAMILY MEDICINE

## 2018-12-07 PROCEDURE — 0DB68ZX EXCISION OF STOMACH, VIA NATURAL OR ARTIFICIAL OPENING ENDOSCOPIC, DIAGNOSTIC: ICD-10-PCS | Performed by: INTERNAL MEDICINE

## 2018-12-07 PROCEDURE — 25010000002 HYDROMORPHONE 1 MG/ML SOLUTION: Performed by: FAMILY MEDICINE

## 2018-12-07 PROCEDURE — 99232 SBSQ HOSP IP/OBS MODERATE 35: CPT | Performed by: INTERNAL MEDICINE

## 2018-12-07 PROCEDURE — 43239 EGD BIOPSY SINGLE/MULTIPLE: CPT | Performed by: INTERNAL MEDICINE

## 2018-12-07 PROCEDURE — 25010000002 PROPOFOL 10 MG/ML EMULSION: Performed by: NURSE ANESTHETIST, CERTIFIED REGISTERED

## 2018-12-07 PROCEDURE — 94799 UNLISTED PULMONARY SVC/PX: CPT

## 2018-12-07 PROCEDURE — 0DB58ZX EXCISION OF ESOPHAGUS, VIA NATURAL OR ARTIFICIAL OPENING ENDOSCOPIC, DIAGNOSTIC: ICD-10-PCS | Performed by: INTERNAL MEDICINE

## 2018-12-07 PROCEDURE — 80048 BASIC METABOLIC PNL TOTAL CA: CPT | Performed by: FAMILY MEDICINE

## 2018-12-07 RX ORDER — SODIUM CHLORIDE, SODIUM LACTATE, POTASSIUM CHLORIDE, CALCIUM CHLORIDE 600; 310; 30; 20 MG/100ML; MG/100ML; MG/100ML; MG/100ML
100 INJECTION, SOLUTION INTRAVENOUS CONTINUOUS
Status: DISCONTINUED | OUTPATIENT
Start: 2018-12-07 | End: 2018-12-08

## 2018-12-07 RX ORDER — LIDOCAINE HYDROCHLORIDE 20 MG/ML
INJECTION, SOLUTION INFILTRATION; PERINEURAL AS NEEDED
Status: DISCONTINUED | OUTPATIENT
Start: 2018-12-07 | End: 2018-12-07 | Stop reason: SURG

## 2018-12-07 RX ORDER — PROPOFOL 10 MG/ML
VIAL (ML) INTRAVENOUS AS NEEDED
Status: DISCONTINUED | OUTPATIENT
Start: 2018-12-07 | End: 2018-12-07 | Stop reason: SURG

## 2018-12-07 RX ORDER — SODIUM CHLORIDE, SODIUM LACTATE, POTASSIUM CHLORIDE, CALCIUM CHLORIDE 600; 310; 30; 20 MG/100ML; MG/100ML; MG/100ML; MG/100ML
INJECTION, SOLUTION INTRAVENOUS CONTINUOUS PRN
Status: DISCONTINUED | OUTPATIENT
Start: 2018-12-07 | End: 2018-12-07 | Stop reason: SURG

## 2018-12-07 RX ORDER — GLYCOPYRROLATE 0.2 MG/ML
INJECTION INTRAMUSCULAR; INTRAVENOUS AS NEEDED
Status: DISCONTINUED | OUTPATIENT
Start: 2018-12-07 | End: 2018-12-07 | Stop reason: SURG

## 2018-12-07 RX ORDER — MEPERIDINE HYDROCHLORIDE 25 MG/ML
12.5 INJECTION INTRAMUSCULAR; INTRAVENOUS; SUBCUTANEOUS
Status: ACTIVE | OUTPATIENT
Start: 2018-12-07 | End: 2018-12-08

## 2018-12-07 RX ADMIN — ONDANSETRON 4 MG: 2 SOLUTION INTRAMUSCULAR; INTRAVENOUS at 23:06

## 2018-12-07 RX ADMIN — SUCRALFATE 1 G: 1 TABLET ORAL at 21:23

## 2018-12-07 RX ADMIN — GLYCOPYRROLATE 0.1 MG: 0.2 INJECTION INTRAMUSCULAR; INTRAVENOUS at 16:23

## 2018-12-07 RX ADMIN — LIDOCAINE HYDROCHLORIDE 100 MG: 20 INJECTION, SOLUTION INFILTRATION; PERINEURAL at 16:29

## 2018-12-07 RX ADMIN — SODIUM CHLORIDE, PRESERVATIVE FREE 3 ML: 5 INJECTION INTRAVENOUS at 08:14

## 2018-12-07 RX ADMIN — SODIUM CHLORIDE 8 MG/HR: 900 INJECTION INTRAVENOUS at 21:23

## 2018-12-07 RX ADMIN — SODIUM CHLORIDE 8 MG/HR: 900 INJECTION INTRAVENOUS at 01:26

## 2018-12-07 RX ADMIN — HYDROMORPHONE HYDROCHLORIDE 0.5 MG: 1 INJECTION, SOLUTION INTRAMUSCULAR; INTRAVENOUS; SUBCUTANEOUS at 23:43

## 2018-12-07 RX ADMIN — SODIUM CHLORIDE, POTASSIUM CHLORIDE, SODIUM LACTATE AND CALCIUM CHLORIDE: 600; 310; 30; 20 INJECTION, SOLUTION INTRAVENOUS at 15:32

## 2018-12-07 RX ADMIN — PROPOFOL 30 MG: 10 INJECTION, EMULSION INTRAVENOUS at 16:34

## 2018-12-07 RX ADMIN — PROPOFOL 40 MG: 10 INJECTION, EMULSION INTRAVENOUS at 16:31

## 2018-12-07 RX ADMIN — SODIUM CHLORIDE 8 MG/HR: 900 INJECTION INTRAVENOUS at 06:49

## 2018-12-07 RX ADMIN — SUCRALFATE 1 G: 1 TABLET ORAL at 17:30

## 2018-12-07 RX ADMIN — SODIUM CHLORIDE 8 MG/HR: 900 INJECTION INTRAVENOUS at 10:39

## 2018-12-07 RX ADMIN — SODIUM CHLORIDE, POTASSIUM CHLORIDE, SODIUM LACTATE AND CALCIUM CHLORIDE 100 ML/HR: 600; 310; 30; 20 INJECTION, SOLUTION INTRAVENOUS at 18:01

## 2018-12-07 RX ADMIN — SODIUM CHLORIDE, POTASSIUM CHLORIDE, SODIUM LACTATE AND CALCIUM CHLORIDE 100 ML/HR: 600; 310; 30; 20 INJECTION, SOLUTION INTRAVENOUS at 23:01

## 2018-12-07 RX ADMIN — SODIUM CHLORIDE, PRESERVATIVE FREE 3 ML: 5 INJECTION INTRAVENOUS at 23:05

## 2018-12-07 RX ADMIN — PROPOFOL 80 MG: 10 INJECTION, EMULSION INTRAVENOUS at 16:29

## 2018-12-07 RX ADMIN — SUCRALFATE 1 G: 1 TABLET ORAL at 05:57

## 2018-12-07 RX ADMIN — PROPOFOL 50 MG: 10 INJECTION, EMULSION INTRAVENOUS at 16:37

## 2018-12-07 NOTE — PLAN OF CARE
Problem: Patient Care Overview  Goal: Plan of Care Review  Outcome: Ongoing (interventions implemented as appropriate)   12/07/18 1821   Coping/Psychosocial   Plan of Care Reviewed With patient   Plan of Care Review   Progress improving   OTHER   Outcome Summary Patient arrived back to floor from EDG around 1745. No complaints of pain and VSS. Protonix and LR infusing. SCDS on. Started on full liquids.        Problem: Nausea/Vomiting (Adult)  Goal: Symptom Relief  Outcome: Ongoing (interventions implemented as appropriate)    Goal: Adequate Hydration  Outcome: Ongoing (interventions implemented as appropriate)      Problem: Pain, Acute (Adult)  Goal: Acceptable Pain Control/Comfort Level  Outcome: Ongoing (interventions implemented as appropriate)

## 2018-12-07 NOTE — NURSING NOTE
"Continued Stay Note  MARY Aguirre     Patient Name: Lila Pabon  MRN: [id number]  Today's Date: 12/7/2018    Admit Date: 12/4/2018    Discharge Plan     Row Name 12/07/18 1255       Plan    Plan Comments  poke with Mrs Pabon at bedside.  She states \"I can hardly stand fluids, they make me sick.  I'm nauseated a lot.  I'm having an EGD this afternoon before they can do my heart cath.\"  Plan remains home when stable.  EGD today and per cardio cath next week.  Will continue to follow        Discharge Codes    No documentation.             Ashleigh Alva RN    "

## 2018-12-07 NOTE — PROGRESS NOTES
LOS: 3 days   Patient Care Team:  Jaskaran Zhang MD as PCP - General (Family Medicine)    Chief Complaint:     F/u elevated troponin and elevated RVSP    Interval History:     Has nausea but no vomiting and no CP.  No dyspnea.     Objective   Vital Signs  Temp:  [97.2 °F (36.2 °C)-98.8 °F (37.1 °C)] 98.3 °F (36.8 °C)  Heart Rate:  [59-69] 68  Resp:  [18] 18  BP: (109-128)/(61-85) 121/78    Intake/Output Summary (Last 24 hours) at 12/7/2018 0734  Last data filed at 12/6/2018 2339  Gross per 24 hour   Intake 1627.5 ml   Output 5400 ml   Net -3772.5 ml       Comfortable NAD  Neck supple, no JVD or thyromegaly appreciated  S1/S2 RRR, no m/r/g  Lungs CTA B, normal effort  Abdomen S/NT/ND (+) BS, no HSM appreciated  Extremities warm, no clubbing, cyanosis, or edema  No visible or palpable skin lesions  A/Ox4, mood and affect appropriate    Results Review:      Results from last 7 days   Lab Units  12/07/18   0401  12/06/18   0338  12/05/18   0355   SODIUM mmol/L  142  141  141   POTASSIUM mmol/L  3.8  3.6  3.2*   CHLORIDE mmol/L  104  110*  106   CO2 mmol/L  28.4  19.5*  22.9   BUN mg/dL  10  12  16   CREATININE mg/dL  0.91  0.89  0.89   GLUCOSE mg/dL  91  87  82   CALCIUM mg/dL  8.7*  8.0*  7.9*     Results from last 7 days   Lab Units  12/04/18   0645  12/04/18   0237   TROPONIN T ng/mL  0.102*  0.141*     Results from last 7 days   Lab Units  12/06/18   0338  12/05/18   0355  12/04/18   0237   WBC 10*3/mm3  7.80  8.90  13.64*   HEMOGLOBIN g/dL  9.7*  10.3*  12.7   HEMATOCRIT %  29.4*  30.7*  36.7*   PLATELETS 10*3/mm3  151  144  199                       I reviewed the patient's new clinical results.  I personally viewed and interpreted the patient's EKG/Telemetry data        Medication Review:     sodium chloride 3 mL Intravenous Q12H   sucralfate 1 g Oral 4x Daily AC & at Bedtime         pantoprazole 8 mg/hr Last Rate: 8 mg/hr (12/07/18 0649)       Assessment/Plan       Epigastric pain    Bilious  vomiting with nausea    Type 2 myocardial infarction (CMS/HCC)    Esophagitis    1.  Epigastric discomfort with billous vomiting and nausea-drinking minimal clear liquids now, followed by GI - EGD today.   2.  Troponin above reference-EKG today has demonstrated resolution of her T-wave inversion.  Her echocardiogram demonstrates mild hypokinesis of the inferior wall..  No symptoms of instability. Will want to proceed with heart cath ([facility]) once medical status allows.  Would start antiplatelet (ASA or Plavix) if OK with GI.   3.  Pulmonary hypertension-this is a new finding.  She did have mild pulmonary hypertension on her echocardiogram in June, but her pulmonary care pressure today is 80 mmHg which is a dramatic increase.  CT angio shows no PE. Will check with [facility] when perform her cath    EGD today  - likely cath next week.  Still NPO      Lenore Perdomo MD  12/07/18  7:34 AM

## 2018-12-07 NOTE — PROGRESS NOTES
"Daily Progress Note:      Chief complaint: Follow-up esophagitis, NSTEMI    Subjective: Feels better.  Abdominal pain is persistent but improved.     Vital Signs  Temp:  [97.2 °F (36.2 °C)-98.8 °F (37.1 °C)] 98.3 °F (36.8 °C)  Heart Rate:  [59-69] 68  Resp:  [18] 18  BP: (109-128)/(61-85) 121/78  Oxygen Therapy  SpO2: 96 %  Pulse Oximetry Type: Continuous  Device (Oxygen Therapy): room air}  Body mass index is 20.67 kg/m².  Flowsheet Rows      First Filed Value   Admission Height  162 cm (63.78\") Documented at 12/04/2018 0215   Admission Weight  56.7 kg (125 lb) Documented at 12/04/2018 0215                   Documented weights    12/04/18 0215 12/04/18 0443 12/05/18 0843 12/06/18 0600   Weight: 56.7 kg (125 lb) 57.6 kg (127 lb) 57.6 kg (127 lb) 56.4 kg (124 lb 6.4 oz)    12/06/18 0700 12/07/18 0628 12/07/18 0747   Weight: 59.4 kg (131 lb) 55.1 kg (121 lb 6 oz) 54.6 kg (120 lb 6.4 oz)           Patient Vitals for the past 24 hrs:   BP Temp Temp src Pulse Resp SpO2 Weight   12/07/18 0747 -- -- -- -- -- -- 54.6 kg (120 lb 6.4 oz)   12/07/18 0628 121/78 98.3 °F (36.8 °C) Oral 68 18 96 % 55.1 kg (121 lb 6 oz)   12/07/18 0408 128/85 98 °F (36.7 °C) Oral 64 18 94 % --   12/06/18 2339 109/61 98.2 °F (36.8 °C) Oral 59 18 96 % --   12/06/18 2003 119/78 97.2 °F (36.2 °C) Oral 67 18 97 % --   12/06/18 1502 120/85 98.8 °F (37.1 °C) Oral 69 18 96 % --   12/06/18 1106 117/80 98.3 °F (36.8 °C) Oral 69 18 96 % --   12/06/18 0900 -- -- -- -- -- 98 % --       54.6 kg (120 lb 6.4 oz)      Intake/Output Summary (Last 24 hours) at 12/7/2018 0802  Last data filed at 12/6/2018 2339  Gross per 24 hour   Intake 1627.5 ml   Output 5400 ml   Net -3772.5 ml       Review of Systems   Constitutional: Negative for activity change and appetite change.   HENT: Negative for congestion, postnasal drip, rhinorrhea and sinus pain.    Respiratory: Negative for chest tightness, shortness of breath and wheezing.    Cardiovascular: Negative for chest pain. "   Gastrointestinal: Positive for abdominal pain. Negative for abdominal distention, diarrhea, nausea and vomiting.   Endocrine: Negative for polyphagia and polyuria.   Genitourinary: Negative for frequency.   Skin: Negative for rash.   Neurological: Negative for light-headedness.   Hematological: Does not bruise/bleed easily.   Psychiatric/Behavioral: Negative for agitation and behavioral problems.       Physical Exam   Constitutional: She appears well-developed and well-nourished.   HENT:   Head: Normocephalic.   Mouth/Throat: Oropharynx is clear and moist.   Eyes: Conjunctivae are normal.   Neck: Normal range of motion. No JVD present. No thyromegaly present.   Cardiovascular: Normal rate, regular rhythm and normal heart sounds.   No murmur heard.  Pulmonary/Chest: Effort normal. No respiratory distress. She has no wheezes.   Abdominal: Soft. Bowel sounds are normal. She exhibits no distension. There is tenderness in the epigastric area. There is no guarding.   Neurological: She is alert.   Skin: Skin is warm and dry. No rash noted.   Nursing note and vitals reviewed.      Medication Review:   I have reviewed the patient's current medication list  Scheduled Meds:    sodium chloride 3 mL Intravenous Q12H   sucralfate 1 g Oral 4x Daily AC & at Bedtime     Continuous Infusions:    pantoprazole 8 mg/hr Last Rate: 8 mg/hr (12/07/18 0649)     PRN Meds:.•  acetaminophen  •  bisacodyl  •  calcium carbonate  •  HYDROmorphone  •  magnesium hydroxide  •  ondansetron  •  ondansetron  •  sodium chloride  •  sodium chloride  •  sodium chloride  @medsinfusion@      Labs:  Results from last 7 days   Lab Units  12/06/18   0338  12/05/18   0355  12/04/18   0237   WBC 10*3/mm3  7.80  8.90  13.64*   HEMOGLOBIN g/dL  9.7*  10.3*  12.7   HEMATOCRIT %  29.4*  30.7*  36.7*   PLATELETS 10*3/mm3  151  144  199     Results from last 7 days   Lab Units  12/07/18   0401  12/06/18   0338  12/05/18   0355  12/04/18   0237   SODIUM mmol/L  142   141  141  138   POTASSIUM mmol/L  3.8  3.6  3.2*  3.8   CHLORIDE mmol/L  104  110*  106  96*   CO2 mmol/L  28.4  19.5*  22.9  22.5   BUN mg/dL  10  12  16  16   CREATININE mg/dL  0.91  0.89  0.89  1.09*   CALCIUM mg/dL  8.7*  8.0*  7.9*  9.7   BILIRUBIN mg/dL   --    --   0.6  0.9   ALK PHOS U/L   --    --   65  67   ALT (SGPT) U/L   --    --   50*  40*   AST (SGOT) U/L   --    --   53*  46*   GLUCOSE mg/dL  91  87  82  141*           Lab Results (last 24 hours)     Procedure Component Value Units Date/Time    Basic Metabolic Panel [[id number]]  (Abnormal) Collected:  12/07/18 0401    Specimen:  Blood Updated:  12/07/18 0526     Glucose 91 mg/dL      BUN 10 mg/dL      Creatinine 0.91 mg/dL      Sodium 142 mmol/L      Potassium 3.8 mmol/L      Chloride 104 mmol/L      CO2 28.4 mmol/L      Calcium 8.7 mg/dL      eGFR Non African Amer 62 mL/min/1.73      BUN/Creatinine Ratio 11.0     Anion Gap 9.6 mmol/L     Narrative:       GFR Normal >60  Chronic Kidney Disease <60  Kidney Failure <15    BNP [[id number]]  (Abnormal) Collected:  12/06/18 0338    Specimen:  Blood Updated:  12/06/18 0840     proBNP 8,147.0 pg/mL     Narrative:       Among patients with dyspnea, NT-proBNP is highly sensitive for the detection of acute congestive heart failure. In addition NT-proBNP of <300 pg/ml effectively rules out acute congestive heart failure with 99% negative predictive value.        Results from last 7 days   Lab Units  12/05/18   1338  12/04/18   0645  12/04/18   0237   TROPONIN T ng/mL   --   0.102*  0.141*   D DIMER QUANT MCGFEU/mL  1.53*   --    --          Results from last 7 days   Lab Units  12/06/18   0338   PROBNP pg/mL  8,147.0*                   Invalid input(s): LDLCALC          No results found for: POCGLU  Results from last 7 days   Lab Units  12/04/18   0702  12/04/18   0310   LACTATE mmol/L  0.8  3.4*         Results from last 7 days   Lab Units  12/04/18   0321   NITRITE UA   Negative   WBC UA /HPF  3-5*   BACTERIA  UA /HPF  Trace*   SQUAM EPITHEL UA /HPF  3-6*             Radiology:  Imaging Results (last 24 hours)     ** No results found for the last 24 hours. **          Cardiology:  ECG/EMG Results (last 24 hours)     Procedure Component Value Units Date/Time    ECG 12 Lead [[id number]] Collected:  12/05/18 1151     Updated:  12/05/18 1153    Narrative:       RR Interval= 870 ms  MS Interval= 116 ms  QRSD Interval= 77 ms  QT Interval= 417 ms  QTc Interval= 447 ms  Heart Rate= 69 ms  P Axis= -11 deg  QRS Axis= -35 deg  T Wave Axis= 65 deg  I: 40 Axis= 50 deg  T: 40 Axis= -48 deg  ST Axis= 58 deg  Sinus rhythm  Borderline short MS interval  Left axis deviation  Low voltage, extremity leads  Electronically Signed by:  Date and Time of Study: 2018-12-05 11:51:00    Adult Transthoracic Echo Complete W/ Cont if Necessary Per Protocol [[id number]] Collected:  12/05/18 0733     Updated:  12/05/18 1248     BSA 1.6 m^2      IVSd 0.75 cm      LVIDd 5.1 cm      LVIDs 3.4 cm      LVPWd 0.76 cm      IVS/LVPW 0.98     FS 33.0 %      EDV(Teich) 121.6 ml      ESV(Teich) 47.1 ml      EF(Teich) 61.3 %      EDV(cubed) 129.6 ml      ESV(cubed) 39.0 ml      EF(cubed) 69.9 %      LV mass(C)d 129.0 grams      LV mass(C)dI 80.0 grams/m^2      SV(Teich) 74.5 ml      SI(Teich) 46.2 ml/m^2      SV(cubed) 90.6 ml      SI(cubed) 56.2 ml/m^2      Ao root diam 3.1 cm      Ao root area 7.5 cm^2      ACS 1.6 cm      LA dimension 3.4 cm      LA/Ao 1.1     LVOT diam 1.8 cm      LVOT area 2.5 cm^2      LVOT area(traced) 2.5 cm^2      RVOT diam 1.9 cm      RVOT area 2.8 cm^2      LVLd ap4 7.5 cm      EDV(MOD-sp4) 89.4 ml      LVLs ap4 6.2 cm      ESV(MOD-sp4) 42.8 ml      EF(MOD-sp4) 52.1 %      LVLd ap2 7.6 cm      EDV(MOD-sp2) 82.8 ml      LVLs ap2 6.3 cm      ESV(MOD-sp2) 36.2 ml      EF(MOD-sp2) 56.3 %      SV(MOD-sp4) 46.6 ml      SI(MOD-sp4) 28.9 ml/m^2      SV(MOD-sp2) 46.6 ml      SI(MOD-sp2) 28.9 ml/m^2      Ao root area (BSA corrected) 1.9     LV Oliver  Vol (BSA corrected) 55.4 ml/m^2      LV Sys Vol (BSA corrected) 26.5 ml/m^2      MV A dur 0.19 sec      MV E max jr 119.0 cm/sec      MV A max jr 52.6 cm/sec      MV E/A 2.3     MV V2 max 119.0 cm/sec      MV max PG 5.7 mmHg      MV V2 mean 61.5 cm/sec      MV mean PG 2.0 mmHg      MV V2 VTI 30.2 cm      MVA(VTI) 1.8 cm^2      MV P1/2t max jr 116.0 cm/sec      MV P1/2t 92.8 msec      MVA(P1/2t) 2.4 cm^2      MV dec slope 366.0 cm/sec^2      MV dec time 0.16 sec      Ao pk jr 133.0 cm/sec      Ao max PG 7.1 mmHg      Ao max PG (full) 2.1 mmHg      Ao V2 mean 92.3 cm/sec      Ao mean PG 4.0 mmHg      Ao mean PG (full) 2.0 mmHg      Ao V2 VTI 28.8 cm      LOREE(I,A) 1.9 cm^2      LOREE(I,D) 1.9 cm^2      LOREE(V,A) 2.1 cm^2      LOREE(V,D) 2.1 cm^2      LV V1 max PG 4.9 mmHg      LV V1 mean PG 2.0 mmHg      LV V1 max 111.0 cm/sec      LV V1 mean 66.5 cm/sec      LV V1 VTI 21.0 cm      MR max jr 413.0 cm/sec      MR max PG 68.2 mmHg      SV(Ao) 217.4 ml      SI(Ao) 134.8 ml/m^2      SV(LVOT) 53.4 ml      SV(RVOT) 41.7 ml      SI(LVOT) 33.1 ml/m^2      PA V2 max 103.0 cm/sec      PA max PG 4.2 mmHg      PA max PG (full) 2.6 mmHg      BH CV ECHO LIMA - PVA(V,A) 1.7 cm^2      BH CV ECHO LIMA - PVA(V,D) 1.7 cm^2      PA acc time 0.14 sec      RV V1 max PG 1.6 mmHg      RV V1 mean PG 1.0 mmHg      RV V1 max 63.5 cm/sec      RV V1 mean 39.6 cm/sec      RV V1 VTI 14.7 cm      TR max jr 425.0 cm/sec      RVSP(TR) 80.3 mmHg      RAP systole 8.0 mmHg      PA pr(Accel) 15.6 mmHg      Pulm Sys Jr 70.0 cm/sec      Pulm Oliver Jr 65.1 cm/sec      Pulm S/D 1.1     Qp/Qs 0.78     Pulm A Revs Dur 0.17 sec      Pulm A Revs Jr 42.7 cm/sec      MVA P1/2T LCG 1.9 cm^2      BH CV ECHO LIMA - BZI_BMI 21.8 kilograms/m^2      BH CV ECHO LIMA - BSA([place]) 1.6 m^2      BH CV ECHO LIMA - BZI_METRIC_WEIGHT 57.6 kg      BH CV ECHO LIMA - BZI_METRIC_HEIGHT 162.6 cm      Target HR (85%) 132 bpm      Max. Pred. HR (100%) 155 bpm      BH CV VAS BP  RIGHT /81 mmHg      TDI S' 17.00 cm/sec      RV Base 3.60 cm      LA volume 57.0 cm3      Dimensionless Index 0.8 (DI)      LA Volume Index 38.0 mL/m2      Avg E/e' ratio 10.35     EF(MOD-bp) 54.0 %      Lat Peak E' Jr 13.0 cm/sec      Med Peak E' Jr 10.00 cm/sec      TAPSE (>1.6) 3.00 cm2      Echo EF Estimated 54 %     Narrative:       · Left ventricular systolic function is normal. Estimated EF = 54%.  · The following left ventricular wall segments are hypokinetic: mid   inferior and basal inferior.  · Left atrial cavity size is mildly dilated.  · Left ventricular diastolic dysfunction (grade II) consistent with   pseudonormalization.  · Mild mitral valve regurgitation is present  · Moderate tricuspid valve regurgitation is present.  · Mild pulmonic valve regurgitation is present.  · Calculated right ventricular systolic pressure from tricuspid   regurgitation is 80.3 mmHg. Severe pulmonary hypertension is present.             I have reviewed recent labs results and consult notes.  Parts of this note may have been copied and pasted but patient was examined and interviewed by me today    Assessment and Plan:  1.  Recurrent esophagitis continue IV PPI infusion and IV fluids.   she is tolerating clear liquids and slowly improving     2.   NSTEMI with echocardiographic findings and elevated troponin likely  a non-STEMI.  Plans for future cardiac catheterization discussed with Dr. Dunbar and the patient.  In view of her GI problems develop or she have an endoscopy prior to any cardiac intervention planned for today.    3.  Hypertension controlled nothing acute     4.  Severe pulmonary hypertension uncertain etiology    5.  Fluid overload resolved

## 2018-12-07 NOTE — BRIEF OP NOTE
ESOPHAGOGASTRODUODENOSCOPY  Progress Note    Lila Pabon  12/4/2018 - 12/7/2018    Pre-op Diagnosis:   Epigastric pain [R10.13]  Bilious vomiting with nausea [R11.14]  Esophagitis [K20.9]       Post-Op Diagnosis Codes:     * Epigastric pain [R10.13]     * Bilious vomiting with nausea [R11.14]     * Reflux esophagitis [K21.0]     * Candida esophagitis (CMS/[facility]) [B37.81]    Procedure/CPT® Codes:      Procedure(s):  ESOPHAGOGASTRODUODENOSCOPY JWITH BIOPSIES    Surgeon(s):  Laureano Rehman MD    Anesthesia: Monitor Anesthesia Care    Staff:   Circulator: Jeane Anthony RN  Scrub Person: Jammie Richmond    Estimated Blood Loss: none    Urine Voided: * No values recorded between 12/7/2018  4:19 PM and 12/7/2018  4:45 PM *    Specimens:                ID Type Source Tests Collected by Time   A : Gastric Biopsy Tissue Stomach TISSUE PATHOLOGY EXAM Laureano Rehman MD 12/7/2018 1634   B : Distal Esophagus Biopsy Tissue Esophagus, Distal TISSUE PATHOLOGY EXAM Laureano Rehman MD 12/7/2018 1637         Drains:      Findings: Candida Esophagitis/Reflux esophagitis-Biopsy  Gastritis-Biopsy  Normal Duodenum    Complications: none      Laureano Rehman MD     Date: 12/7/2018  Time: 4:48 PM

## 2018-12-07 NOTE — ANESTHESIA PREPROCEDURE EVALUATION
Anesthesia Evaluation     Patient summary reviewed and Nursing notes reviewed   no history of anesthetic complications:  NPO Solid Status: > 8 hours  NPO Liquid Status: > 4 hours           Airway   Mallampati: I  TM distance: >3 FB  Neck ROM: full  No difficulty expected  Dental - normal exam   (+) upper dentures and implants    Comment: Mini implant lower    Pulmonary     breath sounds clear to auscultation  (+) a smoker (3 ppd for 40 years, quit ) Former,   Sleep apnea: snores.    ROS comment: Pulmonary hypertension on echo  Cardiovascular     ECG reviewed  Rhythm: regular  Rate: normal    (+) hypertension well controlled less than 2 medications, valvular problems/murmurs MR and TI, past MI ,     ROS comment: Lila Pabon MRN  [id number] Sex  Female  (Age)  1955 (63 y.o.)  Admission Information     Admission Date/Time Discharge Date/Time Room/Bed  18  0213  [facility] OR/OR  Sedation Narrator Report     Sedation Narrator Report  Interpretation Summary     · Left ventricular systolic function is normal. Estimated EF = 54%.  · The following left ventricular wall segments are hypokinetic: mid inferior and basal inferior.  · Left atrial cavity size is mildly dilated.  · Left ventricular diastolic dysfunction (grade II) consistent with pseudonormalization.  · Mild mitral valve regurgitation is present  · Moderate tricuspid valve regurgitation is present.  · Mild pulmonic valve regurgitation is present.  · Calculated right ventricular systolic pressure from tricuspid regurgitation is 80.3 mmHg. Severe pulmonary hypertension is present.    ECG 12 Lead   Order: [id number]   Status:  Final result   Visible to patient:  No (Not Released)   Next appt:  None     Narrative       RR Interval= 870 ms  NE Interval= 116 ms  QRSD Interval= 77 ms  QT Interval= 417 ms  QTc Interval= 447 ms  Heart Rate= 69 ms  P Axis= -11 deg  QRS Axis= -35 deg  T Wave Axis= 65 deg  I: 40 Axis= 50 deg  T: 40 Axis= -48 deg  ST Axis= 58  deg  Sinus rhythm  Borderline short W[place] interval  Left axis deviation  Low voltage, extremity leads  No Change from Prior Tracing  Electronically Signed by:  Bam Dunbar ([facility]) 06-Dec-2018 16:02:08  Date and Time of Study: 2018-12-05 11:51:00    Specimen Collected: 12/05/18 11:51        Heart cath 6/26/18  Conclusions:   1.  Normal coronary angiography  2.  Normal left ventricular size and systolic function.  Mid anterolateral wall hypokinesis     Recommendations: Medical management.  GI consultation as well.    Neuro/Psych  (+) headaches,     Numbness: numbness feet and fingers on occ, sciatica.  GI/Hepatic/Renal/Endo    (+)  GERD (pain, vomiting),      Musculoskeletal     (+) back pain, radiculopathy Left lower extremity  Abdominal  - normal exam   Substance History   (-) drug useAlcohol use: occ.     OB/GYN negative ob/gyn ROS         Other   (+) arthritis (knees, hips, shoulders, neck, back)     (-) blood dyscrasia                  Anesthesia Plan    ASA 2     MAC     intravenous induction   Anesthetic plan, all risks, benefits, and alternatives have been provided, discussed and informed consent has been obtained with: patient.  Use of blood products discussed with consented to blood products.

## 2018-12-07 NOTE — ANESTHESIA POSTPROCEDURE EVALUATION
Patient: Lila Pabon    Procedure Summary     Date:  12/07/18 Room / Location:  [facility] ENDOSCOPY 1 /  [facility] OR    Anesthesia Start:  1619 Anesthesia Stop:  1645    Procedure:  ESOPHAGOGASTRODUODENOSCOPY JWITH BIOPSIES (N/A Esophagus) Diagnosis:       Epigastric pain      Bilious vomiting with nausea      Reflux esophagitis      Candida esophagitis (CMS/HCC)      (Epigastric pain [R10.13])      (Bilious vomiting with nausea [R11.14])      (Esophagitis [K20.9])    Surgeon:  Laureano Rehman MD Provider:  Suhas Lee CRNA    Anesthesia Type:  MAC ASA Status:  2          Anesthesia Type: MAC  Last vitals  BP   109/75 (12/07/18 1700)   Temp   97.7 °F (36.5 °C) (12/07/18 1451)   Pulse   66 (12/07/18 1700)   Resp   14 (12/07/18 1700)     SpO2   95 % (12/07/18 1700)     Post Anesthesia Care and Evaluation    Patient location during evaluation: PHASE II  Patient participation: complete - patient participated  Level of consciousness: awake and alert  Pain score: 0  Pain management: adequate  Airway patency: patent  Anesthetic complications: No anesthetic complications  PONV Status: none  Cardiovascular status: acceptable  Respiratory status: acceptable  Hydration status: acceptable

## 2018-12-07 NOTE — PLAN OF CARE
Problem: Nausea/Vomiting (Adult)  Goal: Symptom Relief  Outcome: Ongoing (interventions implemented as appropriate)    Goal: Adequate Hydration  Outcome: Ongoing (interventions implemented as appropriate)      Problem: Pain, Acute (Adult)  Goal: Identify Related Risk Factors and Signs and Symptoms  Outcome: Ongoing (interventions implemented as appropriate)    Goal: Acceptable Pain Control/Comfort Level  Outcome: Ongoing (interventions implemented as appropriate)

## 2018-12-07 NOTE — OP NOTE
ESOPHAGOGASTRODUODENOSCOPY  Procedure Report    Patient Name:  Lila Pabon  YOB: 1955    Date of Surgery:  12/4/2018 - 12/7/2018     Indications:  Nausea Vomiting    Pre-op Diagnosis:   Epigastric pain [R10.13]  Bilious vomiting with nausea [R11.14]  Esophagitis [K20.9]    Post-Op Diagnosis Codes:     * Epigastric pain [R10.13]     * Bilious vomiting with nausea [R11.14]     * Reflux esophagitis [K21.0]     * Candida esophagitis (CMS/[facility]) [B37.81]         Procedure/CPT® Codes:      Procedure(s):  ESOPHAGOGASTRODUODENOSCOPY JWITH BIOPSIES    Staff:  Surgeon(s):  Laureano Rehman MD         Anesthesia: Monitor Anesthesia Care    Estimated Blood Loss: none    Specimens:   ID Type Source Tests Collected by Time   A : Gastric Biopsy Tissue Stomach TISSUE PATHOLOGY EXAM Laureano Rehman MD 12/7/2018 1634   B : Distal Esophagus Biopsy Tissue Esophagus, Distal TISSUE PATHOLOGY EXAM Laureano Rehman MD 12/7/2018 1637       Implants:    Nothing was implanted during the procedure      Description of Procedure: After having signed informed consent, she was brought to the endoscopy suite, placed in the left lateral decubitus position and given her IV sedation.  A bite block was placed between her mandible and maxilla.  A scope was introduced into the oropharynx, then advanced under direct visualization into the esophagus.  The mid esophagus had moderate candida esophagitis.  The scope was advanced into the distal esophagus and the Z-line was only mildly irregular.  There was no active erosion or ulcer.  The scope was advanced into the stomach in retroflexion revealing normal cardia and fundus.  The scope was de-retroflexed and advanced to the antrum.  There was streaky erythema and scattered erosions, but no ulceration in the distal stomach.  The scope was advanced through the pylorus to the duodenal bulb which was examined and normal and around the angle to the second and third  portions of the duodenum revealing a normal ampulla and bile-stained mucosa.  The scope was withdrawn back into the antrum and biopsies were taken to rule out H. pylori.  The scope was withdrawn into the distal esophagus.  Biopsies were taken of the distal esophagus as well as the mid esophagus including some of the candida.  These were sent together as esophageal biopsies.  As the scope was taken from the patient, the remainder of the esophagus was normal appearing.  She tolerated the procedure very well.                      Findings:  Candida Esophagitis/Reflux esophagitis-Biopsy  Gastritis-Biopsy  Normal Duodenum      Complications: None    Recommendations: Continue present meds and Diet and Await Path but will order a HIDA as soon as this can be arranged      Laureano Rehman MD     Date: 12/7/2018  Time: 4:50 PM

## 2018-12-07 NOTE — PROGRESS NOTES
"Adult Nutrition  Assessment/PES    Patient Name:  Lila Pabon  YOB: 1955  MRN: [id number]  Admit Date:  12/4/2018    Assessment Date:  12/7/2018    Comments:  Advance diet as indicated post EGD. May benefit from oral supplement to aid with po/wt.   Will cont to follow and monitor.     Reason for Assessment     Row Name 12/07/18 1447          Reason for Assessment    Reason For Assessment  per organizational policy NPO/Clear x 3      Diagnosis  gastrointestinal disease N/V/D, severe pulm HTN hx esophagitis stage IV in june          Nutrition/Diet History     Row Name 12/07/18 1448          Nutrition/Diet History    Typical Food/Fluid Intake  Pt reports  since been here has dropped, NKFA. from June to Sept avoid coffee, soda, tomato/OJ etc. IN Sept \"healed\" so added these back. Eating fine until after dinner night of admit got really sick early in am & couldn't stop emesis.          Anthropometrics     Row Name 12/07/18 1451 12/07/18 1450       Anthropometrics    Height  162.6 cm (64\")  --    Weight  54.7 kg (120 lb 11.2 oz)  54.6 kg (120 lb 5.9 oz)       Ideal Body Weight (IBW)    Ideal Body Weight (IBW) (kg)  55  --    % Ideal Body Weight  99.54  --       Usual Body Weight (UBW)    Usual Body Weight  --  57.2 kg (126 lb)    % Usual Body Weight  --  95.53    Weight Loss  --  unintentional 7#    Weight Loss Time Frame  --  during admission...pt states got too much wtih fluid and then they backed it off       Body Mass Index (BMI)    BMI (kg/m2)  20.76  --    BMI Assessment  --  BMI 18.5-24.9: normal                                   Row Name 12/07/18 0747          Anthropometrics    Weight  54.6 kg (120 lb 6.4 oz)         Labs/Tests/Procedures/Meds     Row Name 12/07/18 1451          Labs/Procedures/Meds    Lab Results Reviewed  reviewed        Diagnostic Tests/Procedures    Diagnostic Test/Procedure Reviewed  reviewed        Medications    Pertinent Medications Reviewed  reviewed     " "    Physical Findings     Row Name 12/07/18 1451          Physical Findings    Overall Physical Appearance  other (see comments) temporal muscle loss noted         Estimated/Assessed Needs     Row Name 12/07/18 1451          Calculation Measurements    Weight Used For Calculations  54.6 kg (120 lb 5.9 oz)     Height  162.6 cm (64\")        Estimated/Assessed Needs    Additional Documentation  Calorie Requirements (Group);Protein Requirements (Group);Fluid Requirements (Group)        Calorie Requirements    Estimated Calorie Need Method  Radiant-St Jeor     Estimated Calorie Requirement Comment  8511-8547 kcal ( mifflin 1.2-1.3)                                                                                         Protein Requirements    Est Protein Requirement Amount (gms/kg)  1.0 gm protein     Estimated Protein Requirements (gms/day)  54.6        Fluid Requirements    Estimated Fluid Requirements (mL/day)  -- 3699-9863 ml     Estimated Fluid Requirement Method  RDA Method               Nutrition Prescription Ordered     Row Name 12/07/18 1452          Nutrition Prescription PO    Current PO Diet  NPO         Evaluation of Received Nutrient/Fluid Intake     Row Name 12/07/18 1452 12/07/18 1451       Calculation Measurements    Weight Used For Calculations  --  54.6 kg (120 lb 5.9 oz)    Height  --  162.6 cm (64\")       Fluid Intake Evaluation    Oral Fluid (mL)  780 ave x 2, 49%  --       PO Evaluation    Number of Days PO Intake Evaluated  Insufficient Data  --        Evaluation of Prescribed Nutrient/Fluid Intake     Row Name 12/07/18 1451          Calculation Measurements    Weight Used For Calculations  54.6 kg (120 lb 5.9 oz)     Height  162.6 cm (64\")             Problem/Interventions:  Problem 1     Row Name 12/07/18 1453          Nutrition Diagnoses Problem 1    Problem 1  Altered GI Function     Etiology (related to)  Medical Diagnosis     Signs/Symptoms (evidenced by)  NPO                 Intervention " Goal     Row Name 12/07/18 1453          Intervention Goal    PO  Establish PO;PO intake (%)     PO Intake %  50 % or greater     Weight  No significant weight loss         Nutrition Intervention     Row Name 12/07/18 1453          Nutrition Intervention    RD/Tech Action  Follow Tx progress         Nutrition Prescription     Row Name 12/07/18 1453          Other Orders    Other  Advance diet as indicated post EGD         Education/Evaluation     Row Name 12/07/18 1453          Education    Education  Will Instruct as appropriate No needs at this time, pt already followed diet for esophagitis 6/18 to 9/18        Monitor/Evaluation    Monitor  Per protocol;I&O;PO intake;Pertinent labs;Weight;Symptoms           Electronically signed by:  Wandy Kumar RD  12/07/18 2:54 PM

## 2018-12-08 ENCOUNTER — APPOINTMENT (OUTPATIENT)
Dept: NUCLEAR MEDICINE | Facility: HOSPITAL | Age: 63
End: 2018-12-08

## 2018-12-08 PROCEDURE — 99232 SBSQ HOSP IP/OBS MODERATE 35: CPT | Performed by: INTERNAL MEDICINE

## 2018-12-08 PROCEDURE — 94799 UNLISTED PULMONARY SVC/PX: CPT

## 2018-12-08 PROCEDURE — 25010000002 FLUCONAZOLE PER 200 MG: Performed by: INTERNAL MEDICINE

## 2018-12-08 PROCEDURE — 78227 HEPATOBIL SYST IMAGE W/DRUG: CPT

## 2018-12-08 PROCEDURE — A9537 TC99M MEBROFENIN: HCPCS | Performed by: FAMILY MEDICINE

## 2018-12-08 PROCEDURE — 25010000002 SINCALIDE PER 5 MCG: Performed by: FAMILY MEDICINE

## 2018-12-08 PROCEDURE — 0 TECHNETIUM TC 99M MEBROFENIN KIT: Performed by: FAMILY MEDICINE

## 2018-12-08 RX ORDER — KIT FOR THE PREPARATION OF TECHNETIUM TC 99M MEBROFENIN 45 MG/10ML
1 INJECTION, POWDER, LYOPHILIZED, FOR SOLUTION INTRAVENOUS
Status: COMPLETED | OUTPATIENT
Start: 2018-12-08 | End: 2018-12-08

## 2018-12-08 RX ORDER — FLUCONAZOLE 100 MG/1
100 TABLET ORAL DAILY
Status: DISCONTINUED | OUTPATIENT
Start: 2018-12-09 | End: 2018-12-09 | Stop reason: HOSPADM

## 2018-12-08 RX ORDER — FLUCONAZOLE 2 MG/ML
200 INJECTION, SOLUTION INTRAVENOUS ONCE
Status: COMPLETED | OUTPATIENT
Start: 2018-12-08 | End: 2018-12-08

## 2018-12-08 RX ADMIN — SODIUM CHLORIDE 8 MG/HR: 900 INJECTION INTRAVENOUS at 13:29

## 2018-12-08 RX ADMIN — SODIUM CHLORIDE 8 MG/HR: 900 INJECTION INTRAVENOUS at 03:09

## 2018-12-08 RX ADMIN — SUCRALFATE 1 G: 1 TABLET ORAL at 13:32

## 2018-12-08 RX ADMIN — MEBROFENIN 1 DOSE: 45 INJECTION, POWDER, LYOPHILIZED, FOR SOLUTION INTRAVENOUS at 11:12

## 2018-12-08 RX ADMIN — SUCRALFATE 1 G: 1 TABLET ORAL at 16:45

## 2018-12-08 RX ADMIN — SUCRALFATE 1 G: 1 TABLET ORAL at 08:14

## 2018-12-08 RX ADMIN — SODIUM CHLORIDE 8 MG/HR: 900 INJECTION INTRAVENOUS at 08:20

## 2018-12-08 RX ADMIN — SODIUM CHLORIDE, POTASSIUM CHLORIDE, SODIUM LACTATE AND CALCIUM CHLORIDE 100 ML/HR: 600; 310; 30; 20 INJECTION, SOLUTION INTRAVENOUS at 08:20

## 2018-12-08 RX ADMIN — SODIUM CHLORIDE 8 MG/HR: 900 INJECTION INTRAVENOUS at 18:17

## 2018-12-08 RX ADMIN — SODIUM CHLORIDE, PRESERVATIVE FREE 3 ML: 5 INJECTION INTRAVENOUS at 20:28

## 2018-12-08 RX ADMIN — SINCALIDE 1.1 MCG: 5 INJECTION, POWDER, LYOPHILIZED, FOR SOLUTION INTRAVENOUS at 12:05

## 2018-12-08 RX ADMIN — FLUCONAZOLE, SODIUM CHLORIDE 200 MG: 2 INJECTION INTRAVENOUS at 13:29

## 2018-12-08 RX ADMIN — SUCRALFATE 1 G: 1 TABLET ORAL at 20:28

## 2018-12-08 NOTE — PLAN OF CARE
Problem: Patient Care Overview  Goal: Plan of Care Review  Outcome: Ongoing (interventions implemented as appropriate)      Problem: Nausea/Vomiting (Adult)  Goal: Symptom Relief  Outcome: Ongoing (interventions implemented as appropriate)    Goal: Adequate Hydration  Outcome: Ongoing (interventions implemented as appropriate)      Problem: Pain, Acute (Adult)  Goal: Identify Related Risk Factors and Signs and Symptoms  Outcome: Ongoing (interventions implemented as appropriate)    Goal: Acceptable Pain Control/Comfort Level  Outcome: Ongoing (interventions implemented as appropriate)

## 2018-12-08 NOTE — PROGRESS NOTES
GI Daily Progress Note    Assessment/Plan:      Epigastric pain    Bilious vomiting with nausea    Type 2 myocardial infarction (CMS/HCC)    Esophagitis       LOS: 4 days     Lila Pabon is a 63 y.o. female who was admitted with Nausea Vomiting. She reports the symptoms are improving with treatment. Pt tolerating full liquid and is in HIDA scan but her initial results show excellent EF.     Subjective:    Patient expresses abdominal pain  Patient denies vomiting and bloody stools    Objective:    Vital signs in last 24 hours:  Temp:  [97.5 °F (36.4 °C)-98.4 °F (36.9 °C)] 98.4 °F (36.9 °C)  Heart Rate:  [58-87] 61  Resp:  [14-18] 18  BP: (109-127)/(68-95) 112/76    Intake/Output last 3 shifts:  I/O last 3 completed shifts:  In: 1258.3 [P.O.:360; I.V.:898.3]  Out: 1100 [Urine:1100]  Intake/Output this shift:  I/O this shift:  In: 1386.7 [P.O.:360; I.V.:931.7; IV Piggyback:95]  Out: -     Physical Exam:Abdomen  Sounds Normal Active Bowel Sounds   Distension Soft   Tenderness Nontender     Imaging Results (last 72 hours)     Procedure Component Value Units Date/Time    NM HIDA Scan With Pharmacological Intervention [[id number]] Updated:  12/08/18 1229    CT Angiogram Chest With & Without Contrast [[id number]] Collected:  12/06/18 0710     Updated:  12/06/18 0715    Narrative:       CT CHEST WITH CONTRAST, PE PROTOCOL, 12/5/2018     HISTORY:  63-year-old female hospital inpatient with nausea, vomiting and  abdominal pain. Elevated troponin and d-dimer levels.     TECHNIQUE:    CT examination of the chest with IV contrast. CTA MIP multiplanar  pulmonary artery images were reformatted with 3-D postprocessing.  Radiation dose reduction techniques included automated exposure control.  Radiation audit for CT and nuclear cardiology exams in the last 12  months: 2.      COMPARISON:  *  CT chest, 4/13/2018.     FINDINGS:    Normal pulmonary arteries. No evidence of pulmonary. Normal caliber  thoracic aorta. No pericardial  effusion.     Mild diffuse interstitial pulmonary edema with tiny bilateral pleural  effusions and mild dependent posterior lung base atelectasis.      Trace ascites adjacent to the liver.       Impression:       1. Mild diffuse interstitial pulmonary edema and tiny bilateral pleural  effusions. No pericardial effusion.  2. No evidence of pulmonary embolism or other acute vascular abnormality  within the chest.  3. Initial stat report to the ordering service from Dr. Spencer Livingston  at 2113 hours on 12/5/2018.     This report was finalized on 12/6/2018 7:13 AM by Dr. Dudley Garcia MD.       XR Chest 2 View [[id number]] Collected:  12/05/18 1500     Updated:  12/05/18 1507    Narrative:       CHEST X-RAY, 12/5/2018         HISTORY:  63-year-old female recently admitted to the hospital with nausea and  bilious vomiting. Elevated troponins. Echo showing pulmonary artery  hypertension. Former 44 year smoker, quit eight years ago.     TECHNIQUE:  PA and lateral upright chest series.     FINDINGS:  Generalized pulmonary hyperinflation likely reflects COPD.     There are tiny bilateral posterior pleural effusions lung with a tiny  amount of fluid within the pleural fissures, and peripheral bibasilar  Kerley B lines suggest mild interstitial edema. There is no airspace  edema, focal lung consolidation or additional pulmonary abnormality.  Heart size is normal. The central pulmonary arteries do not appear  enlarged.       Impression:       1. COPD.  2. Minimal bibasilar interstitial edema and tiny pleural effusions.  3. Heart size normal.     This report was finalized on 12/5/2018 3:05 PM by Dr. Dudley Garcia MD.             WBC   Date Value Ref Range Status   12/06/2018 7.80 4.80 - 10.80 10*3/mm3 Final     RBC   Date Value Ref Range Status   12/06/2018 3.23 (L) 4.20 - 5.40 10*6/mm3 Final     Hemoglobin   Date Value Ref Range Status   12/06/2018 9.7 (L) 12.0 - 16.0 g/dL Final     Hematocrit   Date Value Ref Range  Status   12/06/2018 29.4 (L) 37.0 - 47.0 % Final     MCV   Date Value Ref Range Status   12/06/2018 91.0 81.0 - 99.0 fL Final     MCH   Date Value Ref Range Status   12/06/2018 30.0 27.0 - 31.0 pg Final     MCHC   Date Value Ref Range Status   12/06/2018 33.0 31.0 - 37.0 g/dL Final     RDW   Date Value Ref Range Status   12/06/2018 13.4 11.5 - 14.5 % Final     RDW-SD   Date Value Ref Range Status   12/06/2018 44.7 37.0 - 54.0 fl Final     MPV   Date Value Ref Range Status   12/06/2018 11.7 (H) 7.4 - 10.4 fL Final     Platelets   Date Value Ref Range Status   12/06/2018 151 140 - 500 10*3/mm3 Final       Glucose   Date Value Ref Range Status   12/07/2018 91 65 - 99 mg/dL Final     Sodium   Date Value Ref Range Status   12/07/2018 142 136 - 145 mmol/L Final     Potassium   Date Value Ref Range Status   12/07/2018 3.8 3.5 - 5.2 mmol/L Final     CO2   Date Value Ref Range Status   12/07/2018 28.4 22.0 - 29.0 mmol/L Final     Chloride   Date Value Ref Range Status   12/07/2018 104 98 - 107 mmol/L Final     Anion Gap   Date Value Ref Range Status   12/07/2018 9.6 mmol/L Final     Creatinine   Date Value Ref Range Status   12/07/2018 0.91 0.57 - 1.00 mg/dL Final     BUN   Date Value Ref Range Status   12/07/2018 10 8 - 23 mg/dL Final     BUN/Creatinine Ratio   Date Value Ref Range Status   12/07/2018 11.0 7.0 - 25.0 Final     Calcium   Date Value Ref Range Status   12/07/2018 8.7 (L) 8.8 - 10.5 mg/dL Final     eGFR Non  Amer   Date Value Ref Range Status   12/07/2018 62 >60 mL/min/1.73 Final     Candida Esophagitis  Reflux esophagitis  Nausea  Normal HIDA  Pulmonary Hypertension  Elevated BNP (from right heart strain?)    Have starte Fluconazole and anticipate transfer to [facility] for cardiology on Monday, will advance diet as tolerated.

## 2018-12-08 NOTE — PLAN OF CARE
Problem: Patient Care Overview  Goal: Plan of Care Review  Outcome: Ongoing (interventions implemented as appropriate)   12/08/18 0617   Coping/Psychosocial   Plan of Care Reviewed With patient   OTHER   Outcome Summary pt c/o abdominal pain and nausea throughout the shift. pain meds and zofrangievn with relief. Protonix gtt and LR infusing. Tolerating liquids. On telemetry running SR. SCD's on. Will montior.       Problem: Nausea/Vomiting (Adult)  Goal: Symptom Relief  Outcome: Ongoing (interventions implemented as appropriate)   12/08/18 0617   Nausea/Vomiting (Adult)   Symptom Relief making progress toward outcome     Goal: Adequate Hydration  Outcome: Ongoing (interventions implemented as appropriate)   12/08/18 0617   Nausea/Vomiting (Adult)   Adequate Hydration making progress toward outcome       Problem: Pain, Acute (Adult)  Goal: Acceptable Pain Control/Comfort Level  Outcome: Ongoing (interventions implemented as appropriate)   12/08/18 0617   Pain, Acute (Adult)   Acceptable Pain Control/Comfort Level making progress toward outcome

## 2018-12-08 NOTE — PROGRESS NOTES
Hospital Follow Up    LOS:  LOS: 4 days   Patient Name: Lila Pabon  Age/Sex: 63 y.o. female  : 1955  MRN: [id number]    Day of Service: 18   Length of Stay: 4  Encounter Provider: Juan C Richey MD  Place of Service: [facility] CARDIOLOGY  Patient Care Team:  Jaskaran Zhang MD as PCP - General (Family Medicine)    Subjective:     Chief Complaint: Chest pain/epigastric pain    Interval History: Patient did undergo a scope yesterday.  Still evidence of a little thrush some irritation.  Patient had a wave of nausea last night it did resolve without vomiting.  She is also able to eat some pudding which is the first substance she has been able to keep down.    Objective:     Objective:  Temp:  [97.5 °F (36.4 °C)-98.4 °F (36.9 °C)] 98.4 °F (36.9 °C)  Heart Rate:  [58-87] 61  Resp:  [14-20] 18  BP: (109-127)/(68-95) 112/76     Intake/Output Summary (Last 24 hours) at 2018 0754  Last data filed at 2018 2300  Gross per 24 hour   Intake 1258.33 ml   Output 500 ml   Net 758.33 ml     Body mass index is 20.72 kg/m².      18  0747 18  1450 18  1451   Weight: 54.6 kg (120 lb 6.4 oz) 54.6 kg (120 lb 5.9 oz) 54.7 kg (120 lb 11.2 oz)     Weight change: -0.442 kg (-15.6 oz)      Physical Exam:   General : Alert, cooperative, in no acute distress.  Neuro: alert,cooperative and oriented  Lungs: CTAB. Normal respiratory effort and rate.  CV:: Regular rate and rhythm, normal S1 and S2, no murmurs, gallops or rubs.  ABD: Soft, nontender, non-distended. positive bowel sounds  Extr: No edema or cyanosis, moves all extremities    Lab Review:   Results from last 7 days   Lab Units  18   0401  18   0338  18   0355  18   0237   SODIUM mmol/L  142  141  141  138   POTASSIUM mmol/L  3.8  3.6  3.2*  3.8   CHLORIDE mmol/L  104  110*  106  96*   CO2 mmol/L  28.4  19.5*  22.9  22.5   BUN mg/dL  10  12  16  16   CREATININE mg/dL  0.91   0.89  0.89  1.09*   GLUCOSE mg/dL  91  87  82  141*   CALCIUM mg/dL  8.7*  8.0*  7.9*  9.7   AST (SGOT) U/L   --    --   53*  46*   ALT (SGPT) U/L   --    --   50*  40*     Results from last 7 days   Lab Units  12/04/18   0645  12/04/18   0237   TROPONIN T ng/mL  0.102*  0.141*     Results from last 7 days   Lab Units  12/06/18   0338  12/05/18   0355   WBC 10*3/mm3  7.80  8.90   HEMOGLOBIN g/dL  9.7*  10.3*   HEMATOCRIT %  29.4*  30.7*   PLATELETS 10*3/mm3  151  144                   Invalid input(s): LDLCALC  Results from last 7 days   Lab Units  12/06/18   0338   PROBNP pg/mL  8,147.0*         I reviewed the patient's new clinical results.  I personally viewed and interpreted the patient's EKG  Current Medications:   Scheduled Meds:  sodium chloride 3 mL Intravenous Q12H   sucralfate 1 g Oral 4x Daily AC & at Bedtime     Continuous Infusions:  lactated ringers 100 mL/hr Last Rate: 100 mL/hr (12/07/18 2301)   pantoprazole 8 mg/hr Last Rate: 8 mg/hr (12/08/18 0309)       Allergies:  Allergies   Allergen Reactions   • Mobic [Meloxicam] Rash       Assessment:       Epigastric pain    Bilious vomiting with nausea    Type 2 myocardial infarction (CMS/HCC)    Esophagitis        Plan:   1.  Nausea and vomiting.  Patient still did have a wave of nausea but no vomiting.  Did keep some putting down which is the best she's done.  Scope was done with Candida esophagitis.  2.  Troponin above reference range with hypokinesis of the inferior wall.  We'll need a left and right heart catheter could she also has newly diagnosed pulmonary hypertension.  At this point the patient continues to remain stable with plan to transfer to [facility] on Sunday evening or Monday morning for a right and left heart catheterization.  We'll work out those logistics.  At this point patient is stable will see tomorrow only as needed if she has any more issues otherwise the plan is to transfer her for further management.        Juan C RESENDIZ  MD [place]  12/08/18  7:54 AM

## 2018-12-08 NOTE — PROGRESS NOTES
"Daily Progress Note:      Chief complaint: Follow-up esophagitis, NSTEMI    Subjective: Feels better.  Abdominal pain is persistent but improved.  Had some nausea but is slowly improving she had a full liquid breakfast and had some pretty but she is Down.  Her endoscopy showed some esophagitis likely Candida    Vital Signs  Temp:  [97.5 °F (36.4 °C)-98.4 °F (36.9 °C)] 98.4 °F (36.9 °C)  Heart Rate:  [58-87] 61  Resp:  [14-18] 18  BP: (109-127)/(68-95) 112/76  Oxygen Therapy  SpO2: 97 %  Pulse Oximetry Type: Intermittent  Device (Oxygen Therapy): room air}  Body mass index is 21.01 kg/m².  Flowsheet Rows      First Filed Value   Admission Height  162 cm (63.78\") Documented at 12/04/2018 0215   Admission Weight  56.7 kg (125 lb) Documented at 12/04/2018 0215                   Documented weights    12/04/18 0215 12/04/18 0443 12/05/18 0843 12/06/18 0600   Weight: 56.7 kg (125 lb) 57.6 kg (127 lb) 57.6 kg (127 lb) 56.4 kg (124 lb 6.4 oz)    12/06/18 0700 12/07/18 0628 12/07/18 0747 12/07/18 1450   Weight: 59.4 kg (131 lb) 55.1 kg (121 lb 6 oz) 54.6 kg (120 lb 6.4 oz) 54.6 kg (120 lb 5.9 oz)    12/07/18 1451 12/08/18 0840   Weight: 54.7 kg (120 lb 11.2 oz) 55.5 kg (122 lb 6.4 oz)           Patient Vitals for the past 24 hrs:   BP Temp Temp src Pulse Resp SpO2 Height Weight   12/08/18 0840 -- -- -- -- -- -- -- 55.5 kg (122 lb 6.4 oz)   12/08/18 0633 112/76 98.4 °F (36.9 °C) Oral 61 18 97 % -- --   12/08/18 0345 112/68 97.9 °F (36.6 °C) Oral 58 18 97 % -- --   12/07/18 2339 116/76 97.5 °F (36.4 °C) Oral 69 18 98 % -- --   12/07/18 2007 -- -- -- -- -- 95 % -- --   12/07/18 1814 126/87 -- -- 73 16 98 % -- --   12/07/18 1744 127/83 98.2 °F (36.8 °C) Oral 62 16 -- -- --   12/07/18 1700 109/75 -- -- 66 14 95 % -- --   12/07/18 1650 124/95 -- -- 87 18 96 % -- --   12/07/18 1451 127/74 97.7 °F (36.5 °C) Temporal 65 16 97 % 162.6 cm (64\") 54.7 kg (120 lb 11.2 oz)   12/07/18 1450 -- -- -- -- -- -- -- 54.6 kg (120 lb 5.9 oz) "       55.5 kg (122 lb 6.4 oz)      Intake/Output Summary (Last 24 hours) at 12/8/2018 1301  Last data filed at 12/8/2018 0921  Gross per 24 hour   Intake 2285 ml   Output --   Net 2285 ml       Review of Systems   Constitutional: Negative for activity change and appetite change.   HENT: Negative for congestion, postnasal drip, rhinorrhea and sinus pain.    Respiratory: Negative for chest tightness, shortness of breath and wheezing.    Cardiovascular: Negative for chest pain.   Gastrointestinal: Positive for abdominal pain. Negative for abdominal distention, diarrhea, nausea and vomiting.   Endocrine: Negative for polyphagia and polyuria.   Genitourinary: Negative for frequency.   Skin: Negative for rash.   Neurological: Negative for light-headedness.   Hematological: Does not bruise/bleed easily.   Psychiatric/Behavioral: Negative for agitation and behavioral problems.       Physical Exam   Constitutional: She appears well-developed and well-nourished.   HENT:   Head: Normocephalic.   Mouth/Throat: Oropharynx is clear and moist.   Eyes: Conjunctivae are normal.   Neck: Normal range of motion. No JVD present. No thyromegaly present.   Cardiovascular: Normal rate, regular rhythm and normal heart sounds.   No murmur heard.  Pulmonary/Chest: Effort normal. No respiratory distress. She has no wheezes.   Abdominal: Soft. Bowel sounds are normal. She exhibits no distension. There is tenderness in the epigastric area. There is no guarding.   Neurological: She is alert.   Skin: Skin is warm and dry. No rash noted.   Nursing note and vitals reviewed.      Medication Review:   I have reviewed the patient's current medication list  Scheduled Meds:    fluconazole 200 mg Intravenous Once   [START ON 12/9/2018] fluconazole 100 mg Oral Daily   sodium chloride 3 mL Intravenous Q12H   sucralfate 1 g Oral 4x Daily AC & at Bedtime     Continuous Infusions:    lactated ringers 100 mL/hr Last Rate: 100 mL/hr (12/08/18 0820)    pantoprazole 8 mg/hr Last Rate: 8 mg/hr (12/08/18 0820)     PRN Meds:.•  acetaminophen  •  bisacodyl  •  calcium carbonate  •  HYDROmorphone  •  magnesium hydroxide  •  meperidine  •  ondansetron  •  ondansetron  •  sodium chloride  •  sodium chloride  •  sodium chloride  @medsinfusion@      Labs:  Results from last 7 days   Lab Units  12/06/18   0338  12/05/18   0355  12/04/18   0237   WBC 10*3/mm3  7.80  8.90  13.64*   HEMOGLOBIN g/dL  9.7*  10.3*  12.7   HEMATOCRIT %  29.4*  30.7*  36.7*   PLATELETS 10*3/mm3  151  144  199     Results from last 7 days   Lab Units  12/07/18   0401  12/06/18   0338  12/05/18   0355  12/04/18   0237   SODIUM mmol/L  142  141  141  138   POTASSIUM mmol/L  3.8  3.6  3.2*  3.8   CHLORIDE mmol/L  104  110*  106  96*   CO2 mmol/L  28.4  19.5*  22.9  22.5   BUN mg/dL  10  12  16  16   CREATININE mg/dL  0.91  0.89  0.89  1.09*   CALCIUM mg/dL  8.7*  8.0*  7.9*  9.7   BILIRUBIN mg/dL   --    --   0.6  0.9   ALK PHOS U/L   --    --   65  67   ALT (SGPT) U/L   --    --   50*  40*   AST (SGOT) U/L   --    --   53*  46*   GLUCOSE mg/dL  91  87  82  141*           Lab Results (last 24 hours)     Procedure Component Value Units Date/Time    Tissue Pathology Exam [[id number]] Collected:  12/07/18 1634    Specimen:  Tissue from Stomach; Tissue from Esophagus, Distal Updated:  12/07/18 1700        Results from last 7 days   Lab Units  12/05/18   1338  12/04/18   0645  12/04/18   0237   TROPONIN T ng/mL   --   0.102*  0.141*   D DIMER QUANT MCGFEU/mL  1.53*   --    --          Results from last 7 days   Lab Units  12/06/18   0338   PROBNP pg/mL  8,147.0*                   Invalid input(s): LDLCALC          No results found for: POCGLU  Results from last 7 days   Lab Units  12/04/18   0702  12/04/18   0310   LACTATE mmol/L  0.8  3.4*         Results from last 7 days   Lab Units  12/04/18   0321   NITRITE UA   Negative   WBC UA /HPF  3-5*   BACTERIA UA /HPF  Trace*   SQUAM EPITHEL UA /HPF  3-6*              Radiology:  Imaging Results (last 24 hours)     Procedure Component Value Units Date/Time    NM HIDA Scan With Pharmacological Intervention [[id number]] Updated:  12/08/18 1229          Cardiology:  ECG/EMG Results (last 24 hours)     Procedure Component Value Units Date/Time    ECG 12 Lead [[id number]] Collected:  12/05/18 1151     Updated:  12/05/18 1153    Narrative:       RR Interval= 870 ms  CA Interval= 116 ms  QRSD Interval= 77 ms  QT Interval= 417 ms  QTc Interval= 447 ms  Heart Rate= 69 ms  P Axis= -11 deg  QRS Axis= -35 deg  T Wave Axis= 65 deg  I: 40 Axis= 50 deg  T: 40 Axis= -48 deg  ST Axis= 58 deg  Sinus rhythm  Borderline short CA interval  Left axis deviation  Low voltage, extremity leads  Electronically Signed by:  Date and Time of Study: 2018-12-05 11:51:00    Adult Transthoracic Echo Complete W/ Cont if Necessary Per Protocol [[id number]] Collected:  12/05/18 0733     Updated:  12/05/18 1248     BSA 1.6 m^2      IVSd 0.75 cm      LVIDd 5.1 cm      LVIDs 3.4 cm      LVPWd 0.76 cm      IVS/LVPW 0.98     FS 33.0 %      EDV(Teich) 121.6 ml      ESV(Teich) 47.1 ml      EF(Teich) 61.3 %      EDV(cubed) 129.6 ml      ESV(cubed) 39.0 ml      EF(cubed) 69.9 %      LV mass(C)d 129.0 grams      LV mass(C)dI 80.0 grams/m^2      SV(Teich) 74.5 ml      SI(Teich) 46.2 ml/m^2      SV(cubed) 90.6 ml      SI(cubed) 56.2 ml/m^2      Ao root diam 3.1 cm      Ao root area 7.5 cm^2      ACS 1.6 cm      LA dimension 3.4 cm      LA/Ao 1.1     LVOT diam 1.8 cm      LVOT area 2.5 cm^2      LVOT area(traced) 2.5 cm^2      RVOT diam 1.9 cm      RVOT area 2.8 cm^2      LVLd ap4 7.5 cm      EDV(MOD-sp4) 89.4 ml      LVLs ap4 6.2 cm      ESV(MOD-sp4) 42.8 ml      EF(MOD-sp4) 52.1 %      LVLd ap2 7.6 cm      EDV(MOD-sp2) 82.8 ml      LVLs ap2 6.3 cm      ESV(MOD-sp2) 36.2 ml      EF(MOD-sp2) 56.3 %      SV(MOD-sp4) 46.6 ml      SI(MOD-sp4) 28.9 ml/m^2      SV(MOD-sp2) 46.6 ml      SI(MOD-sp2) 28.9 ml/m^2      Ao root area  (BSA corrected) 1.9     LV Oliver Vol (BSA corrected) 55.4 ml/m^2      LV Sys Vol (BSA corrected) 26.5 ml/m^2      MV A dur 0.19 sec      MV E max jr 119.0 cm/sec      MV A max jr 52.6 cm/sec      MV E/A 2.3     MV V2 max 119.0 cm/sec      MV max PG 5.7 mmHg      MV V2 mean 61.5 cm/sec      MV mean PG 2.0 mmHg      MV V2 VTI 30.2 cm      MVA(VTI) 1.8 cm^2      MV P1/2t max jr 116.0 cm/sec      MV P1/2t 92.8 msec      MVA(P1/2t) 2.4 cm^2      MV dec slope 366.0 cm/sec^2      MV dec time 0.16 sec      Ao pk jr 133.0 cm/sec      Ao max PG 7.1 mmHg      Ao max PG (full) 2.1 mmHg      Ao V2 mean 92.3 cm/sec      Ao mean PG 4.0 mmHg      Ao mean PG (full) 2.0 mmHg      Ao V2 VTI 28.8 cm      LOREE(I,A) 1.9 cm^2      LOREE(I,D) 1.9 cm^2      LOREE(V,A) 2.1 cm^2      LOREE(V,D) 2.1 cm^2      LV V1 max PG 4.9 mmHg      LV V1 mean PG 2.0 mmHg      LV V1 max 111.0 cm/sec      LV V1 mean 66.5 cm/sec      LV V1 VTI 21.0 cm      MR max jr 413.0 cm/sec      MR max PG 68.2 mmHg      SV(Ao) 217.4 ml      SI(Ao) 134.8 ml/m^2      SV(LVOT) 53.4 ml      SV(RVOT) 41.7 ml      SI(LVOT) 33.1 ml/m^2      PA V2 max 103.0 cm/sec      PA max PG 4.2 mmHg      PA max PG (full) 2.6 mmHg      BH CV ECHO LIMA - PVA(V,A) 1.7 cm^2      BH CV ECHO LIMA - PVA(V,D) 1.7 cm^2      PA acc time 0.14 sec      RV V1 max PG 1.6 mmHg      RV V1 mean PG 1.0 mmHg      RV V1 max 63.5 cm/sec      RV V1 mean 39.6 cm/sec      RV V1 VTI 14.7 cm      TR max jr 425.0 cm/sec      RVSP(TR) 80.3 mmHg      RAP systole 8.0 mmHg      PA pr(Accel) 15.6 mmHg      Pulm Sys Jr 70.0 cm/sec      Pulm Oliver Jr 65.1 cm/sec      Pulm S/D 1.1     Qp/Qs 0.78     Pulm A Revs Dur 0.17 sec      Pulm A Revs Jr 42.7 cm/sec      MVA P1/2T LCG 1.9 cm^2      BH CV ECHO LIMA - BZI_BMI 21.8 kilograms/m^2      BH CV ECHO LIMA - BSA(DuboisCOCK) 1.6 m^2      BH CV ECHO LIMA - BZI_METRIC_WEIGHT 57.6 kg      BH CV ECHO LIMA - BZI_METRIC_HEIGHT 162.6 cm      Target HR (85%) 132 bpm      Max. Pred. HR  (100%) 155 bpm      BH CV VAS BP RIGHT /81 mmHg      TDI S' 17.00 cm/sec      RV Base 3.60 cm      LA volume 57.0 cm3      Dimensionless Index 0.8 (DI)      LA Volume Index 38.0 mL/m2      Avg E/e' ratio 10.35     EF(MOD-bp) 54.0 %      Lat Peak E' Jr 13.0 cm/sec      Med Peak E' Jr 10.00 cm/sec      TAPSE (>1.6) 3.00 cm2      Echo EF Estimated 54 %     Narrative:       · Left ventricular systolic function is normal. Estimated EF = 54%.  · The following left ventricular wall segments are hypokinetic: mid   inferior and basal inferior.  · Left atrial cavity size is mildly dilated.  · Left ventricular diastolic dysfunction (grade II) consistent with   pseudonormalization.  · Mild mitral valve regurgitation is present  · Moderate tricuspid valve regurgitation is present.  · Mild pulmonic valve regurgitation is present.  · Calculated right ventricular systolic pressure from tricuspid   regurgitation is 80.3 mmHg. Severe pulmonary hypertension is present.             I have reviewed recent labs results and consult notes.  Parts of this note may have been copied and pasted but patient was examined and interviewed by me today    Assessment and Plan:  1.  Recurrent esophagitis  both reflux and Candida will continue with present medications     2.   NSTEMI with echocardiographic findings and elevated troponin likely  a non-STEMI.  Plans for a right and left heart catheterization planned for Monday per cardiology    3.  Hypertension controlled nothing acute     4.  Severe pulmonary hypertension uncertain etiology    5.  Fluid overload resolved

## 2018-12-09 ENCOUNTER — HOSPITAL ENCOUNTER (OUTPATIENT)
Facility: HOSPITAL | Age: 63
Setting detail: OBSERVATION
Discharge: HOME | End: 2018-12-10
Attending: INTERNAL MEDICINE | Admitting: INTERNAL MEDICINE

## 2018-12-09 VITALS
BODY MASS INDEX: 21.51 KG/M2 | OXYGEN SATURATION: 98 % | SYSTOLIC BLOOD PRESSURE: 106 MMHG | TEMPERATURE: 97.9 F | HEART RATE: 67 BPM | RESPIRATION RATE: 16 BRPM | DIASTOLIC BLOOD PRESSURE: 70 MMHG | WEIGHT: 126 LBS | HEIGHT: 64 IN

## 2018-12-09 PROCEDURE — 94799 UNLISTED PULMONARY SVC/PX: CPT

## 2018-12-09 PROCEDURE — G0378 HOSPITAL OBSERVATION PER HR: HCPCS

## 2018-12-09 PROCEDURE — 99238 HOSP IP/OBS DSCHRG MGMT 30/<: CPT | Performed by: HOSPITALIST

## 2018-12-09 PROCEDURE — 99232 SBSQ HOSP IP/OBS MODERATE 35: CPT | Performed by: INTERNAL MEDICINE

## 2018-12-09 RX ORDER — LOSARTAN POTASSIUM 25 MG/1
25 TABLET ORAL DAILY
Status: DISCONTINUED | OUTPATIENT
Start: 2018-12-09 | End: 2018-12-10 | Stop reason: HOSPADM

## 2018-12-09 RX ORDER — DOCUSATE SODIUM 100 MG/1
100 CAPSULE, LIQUID FILLED ORAL NIGHTLY
COMMUNITY

## 2018-12-09 RX ORDER — CALCIUM CARBONATE 200(500)MG
1 TABLET,CHEWABLE ORAL 2 TIMES DAILY PRN
Status: DISCONTINUED | OUTPATIENT
Start: 2018-12-09 | End: 2018-12-10 | Stop reason: HOSPADM

## 2018-12-09 RX ORDER — SODIUM CHLORIDE 0.9 % (FLUSH) 0.9 %
1-10 SYRINGE (ML) INJECTION AS NEEDED
Status: DISCONTINUED | OUTPATIENT
Start: 2018-12-09 | End: 2018-12-10 | Stop reason: HOSPADM

## 2018-12-09 RX ORDER — TRAZODONE HYDROCHLORIDE 100 MG/1
100 TABLET ORAL NIGHTLY
Status: DISCONTINUED | OUTPATIENT
Start: 2018-12-09 | End: 2018-12-10 | Stop reason: HOSPADM

## 2018-12-09 RX ORDER — ONDANSETRON 2 MG/ML
4 INJECTION INTRAMUSCULAR; INTRAVENOUS EVERY 8 HOURS PRN
Status: CANCELLED | OUTPATIENT
Start: 2018-12-09

## 2018-12-09 RX ORDER — ONDANSETRON 2 MG/ML
4 INJECTION INTRAMUSCULAR; INTRAVENOUS EVERY 6 HOURS PRN
Status: CANCELLED | OUTPATIENT
Start: 2018-12-09

## 2018-12-09 RX ORDER — SODIUM CHLORIDE 0.9 % (FLUSH) 0.9 %
3 SYRINGE (ML) INJECTION EVERY 12 HOURS SCHEDULED
Status: DISCONTINUED | OUTPATIENT
Start: 2018-12-09 | End: 2018-12-10 | Stop reason: HOSPADM

## 2018-12-09 RX ORDER — UREA 10 %
140 LOTION (ML) TOPICAL
Status: DISCONTINUED | OUTPATIENT
Start: 2018-12-10 | End: 2018-12-10 | Stop reason: HOSPADM

## 2018-12-09 RX ORDER — FLUCONAZOLE 100 MG/1
100 TABLET ORAL DAILY
Status: CANCELLED | OUTPATIENT
Start: 2018-12-10 | End: 2018-12-18

## 2018-12-09 RX ORDER — FLUCONAZOLE 100 MG/1
100 TABLET ORAL DAILY
Status: DISCONTINUED | OUTPATIENT
Start: 2018-12-10 | End: 2018-12-10 | Stop reason: HOSPADM

## 2018-12-09 RX ORDER — PANTOPRAZOLE SODIUM 20 MG/1
20 TABLET, DELAYED RELEASE ORAL
Status: DISCONTINUED | OUTPATIENT
Start: 2018-12-09 | End: 2018-12-09 | Stop reason: SDUPTHER

## 2018-12-09 RX ORDER — ASPIRIN 81 MG/1
81 TABLET ORAL DAILY
COMMUNITY

## 2018-12-09 RX ORDER — CALCIUM CARBONATE 200(500)MG
1 TABLET,CHEWABLE ORAL 2 TIMES DAILY PRN
Status: CANCELLED | OUTPATIENT
Start: 2018-12-09

## 2018-12-09 RX ORDER — SODIUM CHLORIDE 0.9 % (FLUSH) 0.9 %
3 SYRINGE (ML) INJECTION EVERY 12 HOURS SCHEDULED
Status: CANCELLED | OUTPATIENT
Start: 2018-12-09

## 2018-12-09 RX ORDER — IBUPROFEN 800 MG/1
800 TABLET ORAL EVERY 8 HOURS PRN
COMMUNITY
End: 2020-01-15

## 2018-12-09 RX ORDER — SODIUM CHLORIDE 0.9 % (FLUSH) 0.9 %
1-10 SYRINGE (ML) INJECTION AS NEEDED
Status: CANCELLED | OUTPATIENT
Start: 2018-12-09

## 2018-12-09 RX ORDER — PANTOPRAZOLE SODIUM 40 MG/1
40 TABLET, DELAYED RELEASE ORAL
Status: CANCELLED | OUTPATIENT
Start: 2018-12-09

## 2018-12-09 RX ORDER — SUCRALFATE 1 G/1
1 TABLET ORAL
Status: DISCONTINUED | OUTPATIENT
Start: 2018-12-09 | End: 2018-12-10 | Stop reason: HOSPADM

## 2018-12-09 RX ORDER — HYDROMORPHONE HYDROCHLORIDE 1 MG/ML
0.5 INJECTION, SOLUTION INTRAMUSCULAR; INTRAVENOUS; SUBCUTANEOUS
Status: DISCONTINUED | OUTPATIENT
Start: 2018-12-09 | End: 2018-12-10 | Stop reason: HOSPADM

## 2018-12-09 RX ORDER — SODIUM CHLORIDE 9 MG/ML
40 INJECTION, SOLUTION INTRAVENOUS AS NEEDED
Status: CANCELLED | OUTPATIENT
Start: 2018-12-09

## 2018-12-09 RX ORDER — ONDANSETRON 2 MG/ML
4 INJECTION INTRAMUSCULAR; INTRAVENOUS EVERY 8 HOURS PRN
Status: DISCONTINUED | OUTPATIENT
Start: 2018-12-09 | End: 2018-12-10 | Stop reason: HOSPADM

## 2018-12-09 RX ORDER — SODIUM CHLORIDE 0.9 % (FLUSH) 0.9 %
10 SYRINGE (ML) INJECTION AS NEEDED
Status: DISCONTINUED | OUTPATIENT
Start: 2018-12-09 | End: 2018-12-10 | Stop reason: HOSPADM

## 2018-12-09 RX ORDER — ACETAMINOPHEN 325 MG/1
650 TABLET ORAL EVERY 4 HOURS PRN
Status: CANCELLED | OUTPATIENT
Start: 2018-12-09

## 2018-12-09 RX ORDER — FLUTICASONE PROPIONATE 50 MCG
2 SPRAY, SUSPENSION (ML) NASAL AS NEEDED
Status: DISCONTINUED | OUTPATIENT
Start: 2018-12-09 | End: 2018-12-10 | Stop reason: HOSPADM

## 2018-12-09 RX ORDER — SODIUM CHLORIDE 9 MG/ML
40 INJECTION, SOLUTION INTRAVENOUS AS NEEDED
Status: DISCONTINUED | OUTPATIENT
Start: 2018-12-09 | End: 2018-12-10 | Stop reason: HOSPADM

## 2018-12-09 RX ORDER — DOCUSATE SODIUM 100 MG/1
100 CAPSULE, LIQUID FILLED ORAL NIGHTLY
Status: DISCONTINUED | OUTPATIENT
Start: 2018-12-09 | End: 2018-12-10 | Stop reason: HOSPADM

## 2018-12-09 RX ORDER — ONDANSETRON 2 MG/ML
4 INJECTION INTRAMUSCULAR; INTRAVENOUS EVERY 6 HOURS PRN
Status: DISCONTINUED | OUTPATIENT
Start: 2018-12-09 | End: 2018-12-10 | Stop reason: HOSPADM

## 2018-12-09 RX ORDER — PANTOPRAZOLE SODIUM 40 MG/1
40 TABLET, DELAYED RELEASE ORAL
Status: DISCONTINUED | OUTPATIENT
Start: 2018-12-09 | End: 2018-12-10 | Stop reason: HOSPADM

## 2018-12-09 RX ORDER — ACETAMINOPHEN 325 MG/1
650 TABLET ORAL EVERY 4 HOURS PRN
Status: DISCONTINUED | OUTPATIENT
Start: 2018-12-09 | End: 2018-12-10 | Stop reason: HOSPADM

## 2018-12-09 RX ORDER — SODIUM CHLORIDE 0.9 % (FLUSH) 0.9 %
10 SYRINGE (ML) INJECTION AS NEEDED
Status: CANCELLED | OUTPATIENT
Start: 2018-12-09

## 2018-12-09 RX ORDER — HYDROMORPHONE HCL 110MG/55ML
0.5 PATIENT CONTROLLED ANALGESIA SYRINGE INTRAVENOUS
Status: CANCELLED | OUTPATIENT
Start: 2018-12-09 | End: 2018-12-15

## 2018-12-09 RX ORDER — BISACODYL 5 MG/1
5 TABLET, DELAYED RELEASE ORAL DAILY PRN
Status: CANCELLED | OUTPATIENT
Start: 2018-12-09

## 2018-12-09 RX ORDER — PANTOPRAZOLE SODIUM 40 MG/1
40 TABLET, DELAYED RELEASE ORAL
Status: DISCONTINUED | OUTPATIENT
Start: 2018-12-09 | End: 2018-12-09 | Stop reason: HOSPADM

## 2018-12-09 RX ORDER — ASPIRIN 81 MG/1
81 TABLET, CHEWABLE ORAL DAILY
Status: DISCONTINUED | OUTPATIENT
Start: 2018-12-09 | End: 2018-12-10 | Stop reason: HOSPADM

## 2018-12-09 RX ORDER — SUCRALFATE 1 G/1
1 TABLET ORAL
Status: CANCELLED | OUTPATIENT
Start: 2018-12-09

## 2018-12-09 RX ORDER — BISACODYL 5 MG/1
5 TABLET, DELAYED RELEASE ORAL DAILY PRN
Status: DISCONTINUED | OUTPATIENT
Start: 2018-12-09 | End: 2018-12-10 | Stop reason: HOSPADM

## 2018-12-09 RX ADMIN — SUCRALFATE 1 G: 1 TABLET ORAL at 21:06

## 2018-12-09 RX ADMIN — SODIUM CHLORIDE 8 MG/HR: 900 INJECTION INTRAVENOUS at 08:20

## 2018-12-09 RX ADMIN — SUCRALFATE 1 G: 1 TABLET ORAL at 18:23

## 2018-12-09 RX ADMIN — SODIUM CHLORIDE, PRESERVATIVE FREE 3 ML: 5 INJECTION INTRAVENOUS at 08:26

## 2018-12-09 RX ADMIN — PANTOPRAZOLE SODIUM 40 MG: 40 TABLET, DELAYED RELEASE ORAL at 18:23

## 2018-12-09 RX ADMIN — TRAZODONE HYDROCHLORIDE 100 MG: 100 TABLET, FILM COATED ORAL at 21:06

## 2018-12-09 RX ADMIN — Medication 81 MG: at 18:23

## 2018-12-09 RX ADMIN — FLUCONAZOLE 100 MG: 100 TABLET ORAL at 08:34

## 2018-12-09 RX ADMIN — SUCRALFATE 1 G: 1 TABLET ORAL at 06:35

## 2018-12-09 RX ADMIN — LOSARTAN POTASSIUM 25 MG: 25 TABLET, FILM COATED ORAL at 18:23

## 2018-12-09 RX ADMIN — ESTROGENS, CONJUGATED 0.45 MG: 0.3 TABLET, FILM COATED ORAL at 21:05

## 2018-12-09 RX ADMIN — DOCUSATE SODIUM 100 MG: 100 CAPSULE, LIQUID FILLED ORAL at 21:05

## 2018-12-09 RX ADMIN — SODIUM CHLORIDE 8 MG/HR: 900 INJECTION INTRAVENOUS at 03:30

## 2018-12-09 RX ADMIN — SODIUM CHLORIDE, PRESERVATIVE FREE 3 ML: 5 INJECTION INTRAVENOUS at 21:06

## 2018-12-09 RX ADMIN — SUCRALFATE 1 G: 1 TABLET ORAL at 12:05

## 2018-12-09 NOTE — DISCHARGE SUMMARY
Lila GALVIN Law  1955  [id number]        Hospitalists Discharge Summary    Date of Admission: 12/4/2018  Date of Discharge:  12/9/2018    Primary Discharge Diagnoses:   NSTEMI with hypokinesis of inferior wall  Pulmonary Hypertension  Elevated troponin  Epigastric pain with esophagitis (billous vomiting and nausea)/ reflux esophagitis and candida esophagitis/ treated by diflucan.    Secondary Discharge Diagnoses:   Osteoarthritis  Essential Hypertension  H/O NSTEMI 6/25/18  Former smoker/ quit 12/2010 smoked 44 years    PCP  Patient Care Team:  Jaskaran Zhang MD as PCP - General (Family Medicine)    Consults:   Consults     Date and Time Order Name Status Description    12/4/2018 0528 Inpatient Gastroenterology Consult Completed     12/4/2018 0528 Inpatient Cardiology Consult            Operations and Procedures Performed:  Procedure(s):  ESOPHAGOGASTRODUODENOSCOPY JWITH BIOPSIES     Xr Chest 2 View    Result Date: 12/5/2018  Narrative: CHEST X-RAY, 12/5/2018     HISTORY: 63-year-old female recently admitted to the hospital with nausea and bilious vomiting. Elevated troponins. Echo showing pulmonary artery hypertension. Former 44 year smoker, quit eight years ago.  TECHNIQUE: PA and lateral upright chest series.  FINDINGS: Generalized pulmonary hyperinflation likely reflects COPD.  There are tiny bilateral posterior pleural effusions lung with a tiny amount of fluid within the pleural fissures, and peripheral bibasilar Kerley B lines suggest mild interstitial edema. There is no airspace edema, focal lung consolidation or additional pulmonary abnormality. Heart size is normal. The central pulmonary arteries do not appear enlarged.      Impression: 1. COPD. 2. Minimal bibasilar interstitial edema and tiny pleural effusions. 3. Heart size normal.  This report was finalized on 12/5/2018 3:05 PM by Dr. uDdley Garcia MD.      Ct Angiogram Chest With & Without Contrast    Result Date:  12/6/2018  Narrative: CT CHEST WITH CONTRAST, PE PROTOCOL, 12/5/2018  HISTORY: 63-year-old female hospital inpatient with nausea, vomiting and abdominal pain. Elevated troponin and d-dimer levels.  TECHNIQUE:  CT examination of the chest with IV contrast. CTA MIP multiplanar pulmonary artery images were reformatted with 3-D postprocessing. Radiation dose reduction techniques included automated exposure control. Radiation audit for CT and nuclear cardiology exams in the last 12 months: 2.  COMPARISON: *  CT chest, 4/13/2018.  FINDINGS:  Normal pulmonary arteries. No evidence of pulmonary. Normal caliber thoracic aorta. No pericardial effusion.  Mild diffuse interstitial pulmonary edema with tiny bilateral pleural effusions and mild dependent posterior lung base atelectasis.  Trace ascites adjacent to the liver.      Impression: 1. Mild diffuse interstitial pulmonary edema and tiny bilateral pleural effusions. No pericardial effusion. 2. No evidence of pulmonary embolism or other acute vascular abnormality within the chest. 3. Initial stat report to the ordering service from Dr. Spencer Livingston at 2113 hours on 12/5/2018.  This report was finalized on 12/6/2018 7:13 AM by Dr. Dudley Garcia MD.      Nm Hida Scan With Pharmacological Intervention    Result Date: 12/9/2018  Narrative: HIDA SCAN  HISTORY: Nausea and vomiting with increase in 2-3 pain attacks over the past year. Patient reports prior ultrasound imaging demonstrated no evidence of cholelithiasis. Evaluation of gallbladder function. Pain symptoms in the right upper quadrant. Upper endoscopy demonstrated reflux disease. Most recent right upper quadrant pain attack one week ago. Most symptoms tend to occur after meals.  DOSE: 5.0 mCi technetium 99m Choletec. 1.1 mcg CCK  COMPARISON: None  FINDINGS: Initial image demonstrates expected uptake of radiotracer by the liver. Subsequent images demonstrate uptake within the common bile duct with visualization of  the gallbladder at the 15 minute mj and progressive uptake corresponding to the gallbladder up to the 60 minute mj. This is also confirmed on cine images. CCK was administered and ejection fraction measured. Ejection fraction measures 89%, within normal limits.      Impression: 1. No evidence of cystic duct obstruction or acute cholecystitis. 2. Ejection fraction measures 89%, within normal limits.  This report was finalized on 12/9/2018 8:53 AM by Dr. Arnaldo Patel MD.      Mammo Screening Digital Tomosynthesis Bilateral With Cad    Result Date: 11/15/2018  Narrative: EXAM: 1. Bilateral digital screening mammogram with CAD. 2. Bilateral digital screening breast tomosynthesis.  INDICATION: Routine screening  TECHNIQUE: Bilateral digital screening mammogram images were obtained and reviewed with CAD. Digital breast tomosynthesis images were also reviewed.  COMPARISON: 7/7/2014  FINDINGS: There are scattered fibroglandular densities. No masses or suspicious microcalcifications.      Impression: Negative mammogram. Routine screening 1 year is recommended.  BI-RADS CATEGORY 1: Negative.  Women over the age of 40 undergoing screening mammography are entered into a reminder system with target due date for the next mammogram.  This report was finalized on 11/15/2018 11:43 AM by Dr. Carlos Méndez MD.        Allergies:  is allergic to mobic [meloxicam].    Paulo  reviewed    Discharge Medications:     Discharge Medications      ASK your doctor about these medications      Instructions Start Date   amitriptyline 25 MG tablet  Commonly known as:  ELAVIL   12.5 mg, Oral, Nightly      CALCIUM 600+D 600-800 MG-UNIT tablet  Generic drug:  calcium carb-cholecalciferol   1 tablet, Oral, Nightly      estrogens (conjugated) 0.45 MG tablet  Commonly known as:  PREMARIN   0.45 mg, Oral, Nightly, Take daily for 21 days then do not take for 7 days.      ferrous sulfate 140 (45 Fe) MG tablet controlled-release tablet   140 mg, Oral,  Daily With Breakfast      fluticasone 50 MCG/ACT nasal spray  Commonly known as:  FLONASE   2 sprays, Nasal, As Needed      losartan 25 MG tablet  Commonly known as:  COZAAR   25 mg, Oral, Daily      pantoprazole 20 MG EC tablet  Commonly known as:  PROTONIX   1 pill by mouth every morning before breakfast 4 weeks, then every other day for 2 weeks, then every third day for 2 weeks and then stop.      traZODone 100 MG tablet  Commonly known as:  DESYREL   100 mg, Oral, Nightly             History of Present Illness:  Patient came in with abdominal pain and nausea and vomiting    Hospital Course   She was worked up by GI with EGD and was noted to have reflux esophagitis and candida esophagitis treated with diflucan.    Cardiology was consulted and followed while in the hospital at [place].  A decision was made that a heart catheterization was needed.  Dr. Richey decided to move the patient to [facility] today to have that done.      Last Lab Results:   Lab Results (most recent)     Procedure Component Value Units Date/Time    Tissue Pathology Exam [[id number]] Collected:  12/07/18 1634    Specimen:  Tissue from Stomach; Tissue from Esophagus, Distal Updated:  12/07/18 1700    Basic Metabolic Panel [[id number]]  (Abnormal) Collected:  12/07/18 0401    Specimen:  Blood Updated:  12/07/18 0526     Glucose 91 mg/dL      BUN 10 mg/dL      Creatinine 0.91 mg/dL      Sodium 142 mmol/L      Potassium 3.8 mmol/L      Chloride 104 mmol/L      CO2 28.4 mmol/L      Calcium 8.7 mg/dL      eGFR Non African Amer 62 mL/min/1.73      BUN/Creatinine Ratio 11.0     Anion Gap 9.6 mmol/L     Narrative:       GFR Normal >60  Chronic Kidney Disease <60  Kidney Failure <15    BNP [[id number]]  (Abnormal) Collected:  12/06/18 0338    Specimen:  Blood Updated:  12/06/18 0840     proBNP 8,147.0 pg/mL     Narrative:       Among patients with dyspnea, NT-proBNP is highly sensitive for the detection of acute congestive heart  failure. In addition NT-proBNP of <300 pg/ml effectively rules out acute congestive heart failure with 99% negative predictive value.    Basic Metabolic Panel [[id number]]  (Abnormal) Collected:  12/06/18 0338    Specimen:  Blood Updated:  12/06/18 0539     Glucose 87 mg/dL      BUN 12 mg/dL      Creatinine 0.89 mg/dL      Sodium 141 mmol/L      Potassium 3.6 mmol/L      Chloride 110 mmol/L      CO2 19.5 mmol/L      Calcium 8.0 mg/dL      eGFR Non African Amer 64 mL/min/1.73      BUN/Creatinine Ratio 13.5     Anion Gap 11.5 mmol/L     Narrative:       GFR Normal >60  Chronic Kidney Disease <60  Kidney Failure <15    CBC (No Diff) [[id number]]  (Abnormal) Collected:  12/06/18 0338    Specimen:  Blood Updated:  12/06/18 0505     WBC 7.80 10*3/mm3      RBC 3.23 10*6/mm3      Hemoglobin 9.7 g/dL      Hematocrit 29.4 %      MCV 91.0 fL      MCH 30.0 pg      MCHC 33.0 g/dL      RDW 13.4 %      RDW-SD 44.7 fl      MPV 11.7 fL      Platelets 151 10*3/mm3     D-dimer, Quantitative [[id number]]  (Abnormal) Collected:  12/05/18 1338    Specimen:  Blood Updated:  12/05/18 1352     D-Dimer, Quantitative 1.53 MCGFEU/mL     Narrative:       Can be elevated in, but is not diagnostic for deep vein thrombosis (DVT) or pulmonary embolis (PE).  It is also elevated in other medical conditions.  Clinical correlation is required.  The negative cut-off value for the D-Dimer is 0.50 mcg FEU/mL for DVT and PE.    CBC (No Diff) [[id number]]  (Abnormal) Collected:  12/05/18 0355    Specimen:  Blood Updated:  12/05/18 0535     WBC 8.90 10*3/mm3      RBC 3.35 10*6/mm3      Hemoglobin 10.3 g/dL      Hematocrit 30.7 %      MCV 91.6 fL      MCH 30.7 pg      MCHC 33.6 g/dL      RDW 13.4 %      RDW-SD 45.6 fl      MPV 11.7 fL      Platelets 144 10*3/mm3     Comprehensive Metabolic Panel [[id number]]  (Abnormal) Collected:  12/05/18 0355    Specimen:  Blood Updated:  12/05/18 0533     Glucose 82 mg/dL      BUN 16 mg/dL      Creatinine 0.89 mg/dL       Sodium 141 mmol/L      Potassium 3.2 mmol/L      Chloride 106 mmol/L      CO2 22.9 mmol/L      Calcium 7.9 mg/dL      Total Protein 5.8 g/dL      Albumin 3.40 g/dL      ALT (SGPT) 50 U/L      AST (SGOT) 53 U/L      Alkaline Phosphatase 65 U/L      Total Bilirubin 0.6 mg/dL      eGFR Non African Amer 64 mL/min/1.73      Globulin 2.4 gm/dL      A/G Ratio 1.4 g/dL      BUN/Creatinine Ratio 18.0     Anion Gap 12.1 mmol/L     [facility] Draw [[id number]] Collected:  12/04/18 0237    Specimen:  Blood Updated:  12/04/18 0928    Narrative:       The following orders were created for panel order [facility] Draw.  Procedure                               Abnormality         Status                     ---------                               -----------         ------                     Light Blue Top[[id number]]                                                              Green Top (Gel)[[id number]]                                  Final result               Lavender Top[[id number]]                                     Final result               Gold Top - SST[[id number]]                                                                Please view results for these tests on the individual orders.    Troponin [[id number]]  (Abnormal) Collected:  12/04/18 0645    Specimen:  Blood Updated:  12/04/18 0807     Troponin T 0.102 ng/mL     Narrative:       Troponin T Reference Ranges:  Less than 0.03 ng/mL:    Negative for AMI  0.03 to 0.09 ng/mL:      Indeterminant for AMI  Greater than 0.09 ng/mL: Positive for AMI    Lactic Acid, Reflex [[id number]]  (Normal) Collected:  12/04/18 0702    Specimen:  Blood Updated:  12/04/18 0736     Lactate 0.8 mmol/L     Lactic Acid, Reflex Timer (This will reflex a repeat order 3-3:15 hours after ordered.) [[id number]] Collected:  12/04/18 0310    Specimen:  Blood Updated:  12/04/18 0646     Extra Tube Hold for add-ons.     Comment: Auto resulted.       Green Top (Gel) [[id number]] Collected:  12/04/18 0237     Specimen:  Blood Updated:  12/04/18 0345     Extra Tube Hold for add-ons.     Comment: Auto resulted.       Lavender Top [[id number]] Collected:  12/04/18 0237    Specimen:  Blood Updated:  12/04/18 0345     Extra Tube hold for add-on     Comment: Auto resulted       Urinalysis, Microscopic Only - Urine, Clean Catch [[id number]]  (Abnormal) Collected:  12/04/18 0321    Specimen:  Urine, Clean Catch Updated:  12/04/18 0345     RBC, UA 3-5 /HPF      WBC, UA 3-5 /HPF      Bacteria, UA Trace /HPF      Squamous Epithelial Cells, UA 3-6 /HPF      Hyaline Casts, UA None Seen /LPF      Amorphous Crystals, UA Small/1+ /HPF      Methodology Manual Light Microscopy    Urinalysis With Microscopic If Indicated (No Culture) - Urine, Clean Catch [[id number]]  (Abnormal) Collected:  12/04/18 0321    Specimen:  Urine, Clean Catch Updated:  12/04/18 0338     Color, UA Yellow     Appearance, UA Slightly Cloudy     pH, UA 8.5     Specific Gravity, UA 1.020     Glucose, UA Negative     Ketones, UA >=160 mg/dL (4+)     Bilirubin, UA Negative     Blood, UA Trace     Protein, UA Trace     Leuk Esterase, UA Negative     Nitrite, UA Negative     Urobilinogen, UA 0.2 E.U./dL    Lactic Acid, Plasma [[id number]]  (Abnormal) Collected:  12/04/18 0310    Specimen:  Blood Updated:  12/04/18 0335     Lactate 3.4 mmol/L     Troponin [[id number]]  (Abnormal) Collected:  12/04/18 0237    Specimen:  Blood Updated:  12/04/18 0327     Troponin T 0.141 ng/mL     Narrative:       Troponin T Reference Ranges:  Less than 0.03 ng/mL:    Negative for AMI  0.03 to 0.09 ng/mL:      Indeterminant for AMI  Greater than 0.09 ng/mL: Positive for AMI    Comprehensive Metabolic Panel [[id number]]  (Abnormal) Collected:  12/04/18 0237    Specimen:  Blood Updated:  12/04/18 0305     Glucose 141 mg/dL      BUN 16 mg/dL      Creatinine 1.09 mg/dL      Sodium 138 mmol/L      Potassium 3.8 mmol/L      Chloride 96 mmol/L      CO2 22.5 mmol/L      Calcium 9.7 mg/dL      Total  Protein 7.4 g/dL      Albumin 4.50 g/dL      ALT (SGPT) 40 U/L      AST (SGOT) 46 U/L      Alkaline Phosphatase 67 U/L      Total Bilirubin 0.9 mg/dL      eGFR Non African Amer 50 mL/min/1.73      Globulin 2.9 gm/dL      A/G Ratio 1.6 g/dL      BUN/Creatinine Ratio 14.7     Anion Gap 19.5 mmol/L     Lipase [[id number]]  (Abnormal) Collected:  12/04/18 0237    Specimen:  Blood Updated:  12/04/18 0305     Lipase 75 U/L     CBC & Differential [[id number]] Collected:  12/04/18 0237    Specimen:  Blood Updated:  12/04/18 0245    Narrative:       The following orders were created for panel order CBC & Differential.  Procedure                               Abnormality         Status                     ---------                               -----------         ------                     CBC Auto Differential[[id number]]        Abnormal            Final result                 Please view results for these tests on the individual orders.    CBC Auto Differential [[id number]]  (Abnormal) Collected:  12/04/18 0237    Specimen:  Blood Updated:  12/04/18 0245     WBC 13.64 10*3/mm3      RBC 4.18 10*6/mm3      Hemoglobin 12.7 g/dL      Hematocrit 36.7 %      MCV 87.8 fL      MCH 30.4 pg      MCHC 34.6 g/dL      RDW 13.2 %      RDW-SD 42.9 fl      MPV 10.9 fL      Platelets 199 10*3/mm3      Neutrophil % 79.2 %      Lymphocyte % 10.1 %      Monocyte % 9.7 %      Eosinophil % 0.2 %      Basophil % 0.4 %      Immature Grans % 0.4 %      Neutrophils, Absolute 10.79 10*3/mm3      Lymphocytes, Absolute 1.38 10*3/mm3      Monocytes, Absolute 1.32 10*3/mm3      Eosinophils, Absolute 0.03 10*3/mm3      Basophils, Absolute 0.06 10*3/mm3      Immature Grans, Absolute 0.06 10*3/mm3      nRBC 0.0 /100 WBC         Imaging Results (most recent)     Procedure Component Value Units Date/Time    NM HIDA Scan With Pharmacological Intervention [[id number]] Collected:  12/09/18 0849     Updated:  12/09/18 0855    Narrative:       HIDA SCAN     HISTORY:    Nausea and vomiting with increase in 2-3 pain attacks over the past  year. Patient reports prior ultrasound imaging demonstrated no evidence  of cholelithiasis. Evaluation of gallbladder function. Pain symptoms in  the right upper quadrant. Upper endoscopy demonstrated reflux disease.  Most recent right upper quadrant pain attack one week ago. Most symptoms  tend to occur after meals.     DOSE:   5.0 mCi technetium 99m Choletec. 1.1 mcg CCK     COMPARISON:   None     FINDINGS:   Initial image demonstrates expected uptake of radiotracer by the liver.  Subsequent images demonstrate uptake within the common bile duct with  visualization of the gallbladder at the 15 minute mj and progressive  uptake corresponding to the gallbladder up to the 60 minute mj. This  is also confirmed on cine images. CCK was administered and ejection  fraction measured. Ejection fraction measures 89%, within normal limits.       Impression:       1. No evidence of cystic duct obstruction or acute cholecystitis.  2. Ejection fraction measures 89%, within normal limits.     This report was finalized on 12/9/2018 8:53 AM by Dr. Arnaldo Patel MD.       CT Angiogram Chest With & Without Contrast [[id number]] Collected:  12/06/18 0710     Updated:  12/06/18 0715    Narrative:       CT CHEST WITH CONTRAST, PE PROTOCOL, 12/5/2018     HISTORY:  63-year-old female hospital inpatient with nausea, vomiting and  abdominal pain. Elevated troponin and d-dimer levels.     TECHNIQUE:    CT examination of the chest with IV contrast. CTA MIP multiplanar  pulmonary artery images were reformatted with 3-D postprocessing.  Radiation dose reduction techniques included automated exposure control.  Radiation audit for CT and nuclear cardiology exams in the last 12  months: 2.      COMPARISON:  *  CT chest, 4/13/2018.     FINDINGS:    Normal pulmonary arteries. No evidence of pulmonary. Normal caliber  thoracic aorta. No pericardial effusion.     Mild diffuse  interstitial pulmonary edema with tiny bilateral pleural  effusions and mild dependent posterior lung base atelectasis.      Trace ascites adjacent to the liver.       Impression:       1. Mild diffuse interstitial pulmonary edema and tiny bilateral pleural  effusions. No pericardial effusion.  2. No evidence of pulmonary embolism or other acute vascular abnormality  within the chest.  3. Initial stat report to the ordering service from Dr. Spencer Livingston  at 2113 hours on 12/5/2018.     This report was finalized on 12/6/2018 7:13 AM by Dr. Dudley Garcia MD.       XR Chest 2 View [[id number]] Collected:  12/05/18 1500     Updated:  12/05/18 1507    Narrative:       CHEST X-RAY, 12/5/2018         HISTORY:  63-year-old female recently admitted to the hospital with nausea and  bilious vomiting. Elevated troponins. Echo showing pulmonary artery  hypertension. Former 44 year smoker, quit eight years ago.     TECHNIQUE:  PA and lateral upright chest series.     FINDINGS:  Generalized pulmonary hyperinflation likely reflects COPD.     There are tiny bilateral posterior pleural effusions lung with a tiny  amount of fluid within the pleural fissures, and peripheral bibasilar  Kerley B lines suggest mild interstitial edema. There is no airspace  edema, focal lung consolidation or additional pulmonary abnormality.  Heart size is normal. The central pulmonary arteries do not appear  enlarged.       Impression:       1. COPD.  2. Minimal bibasilar interstitial edema and tiny pleural effusions.  3. Heart size normal.     This report was finalized on 12/5/2018 3:05 PM by Dr. Dudley Garcia MD.             PROCEDURES  Procedure(s):  ESOPHAGOGASTRODUODENOSCOPY JWITH BIOPSIES    Condition on Discharge:  Stable    Physical Exam at Discharge  Vital Signs  Temp:  [97.1 °F (36.2 °C)-97.9 °F (36.6 °C)] 97.9 °F (36.6 °C)  Heart Rate:  [59-79] 67  Resp:  [16-19] 16  BP: (106-126)/(66-75) 106/70     Body mass index is 21.63  kg/m².      Physical Exam:  Physical Exam   Constitutional: Patient appears well-developed and thin and in no acute distress   HEENT:   Head: Normocephalic and atraumatic.   Eyes:  Pupils are equal, round, and reactive to light. EOM are intact. Sclera are anicteric and non-injected.  Mouth and Throat: Patient has moist mucous membranes. Oropharynx is clear of any erythema or exudate.     Neck: Neck supple. No JVD present. No thyromegaly present. No lymphadenopathy present.  Cardiovascular: Regular rate, regular rhythm, S1 normal and S2 normal.  Exam reveals no gallop and no friction rub.  No murmur heard.  Pulmonary/Chest: Lungs are clear to auscultation bilaterally. No respiratory distress. No wheezes. No rhonchi. No rales.   Abdominal: Soft. Bowel sounds are normal. No distension and no mass. There is no hepatosplenomegaly. There is no tenderness.   Musculoskeletal: Normal Muscle tone  Extremities: No edema. Pulses are palpable in all 4 extremities.  Neurological: Patient is alert and oriented to person, place, and time. Cranial nerves II-XII are grossly intact with no focal deficits.  Skin: Skin is warm. No rash noted. Nails show no clubbing.  No cyanosis or erythema.    Discharge Disposition  To [facility] for Cardiac Catheterization    Visiting Nurse:    No     Home PT/OT:  No     Home Safety Evaluation:  No     DME  None    Discharge Diet:      Dietary Orders (From admission, onward)    Start     Ordered    12/08/18 1259  Diet Regular; Cardiac  Diet Effective Now     Question Answer Comment   Diet Texture / Consistency Regular    Common Modifiers Cardiac        12/08/18 1258          Activity at Discharge:  As per physicians at [facility]    Pre-discharge education  Cardiac catheterization education      Follow-up Appointments  No future appointments.      Test Results Pending at Discharge   Order Current Status    Tissue Pathology Exam In process        Discussed the risks and  benefits and answered any questions she had.     Yulia Colvin,   12/09/18  2:10 PM    Time: 20 min (if over 30 minutes give explanation as to why it took greater than 30 minutes)

## 2018-12-09 NOTE — PROGRESS NOTES
"Daily Progress Note:      Chief complaint: Follow-up esophagitis, NSTEMI    Subjective: Feels better.  Pain nausea is resolved tolerating a regular diet    Vital Signs  Temp:  [97.1 °F (36.2 °C)-97.9 °F (36.6 °C)] 97.9 °F (36.6 °C)  Heart Rate:  [59-79] 67  Resp:  [16-19] 16  BP: (106-126)/(66-75) 106/70  Oxygen Therapy  SpO2: 98 %  Pulse Oximetry Type: Continuous  Device (Oxygen Therapy): room air}  Body mass index is 21.63 kg/m².  Flowsheet Rows      First Filed Value   Admission Height  162 cm (63.78\") Documented at 12/04/2018 0215   Admission Weight  56.7 kg (125 lb) Documented at 12/04/2018 0215                   Documented weights    12/04/18 0215 12/04/18 0443 12/05/18 0843 12/06/18 0600   Weight: 56.7 kg (125 lb) 57.6 kg (127 lb) 57.6 kg (127 lb) 56.4 kg (124 lb 6.4 oz)    12/06/18 0700 12/07/18 0628 12/07/18 0747 12/07/18 1450   Weight: 59.4 kg (131 lb) 55.1 kg (121 lb 6 oz) 54.6 kg (120 lb 6.4 oz) 54.6 kg (120 lb 5.9 oz)    12/07/18 1451 12/08/18 0840 12/09/18 0623   Weight: 54.7 kg (120 lb 11.2 oz) 55.5 kg (122 lb 6.4 oz) 57.2 kg (126 lb)           Patient Vitals for the past 24 hrs:   BP Temp Temp src Pulse Resp SpO2 Weight   12/09/18 1109 106/70 97.9 °F (36.6 °C) Oral 67 16 98 % --   12/09/18 0811 -- -- -- 79 -- 97 % --   12/09/18 0623 114/68 97.7 °F (36.5 °C) Oral 71 16 100 % 57.2 kg (126 lb)   12/08/18 2243 126/73 97.5 °F (36.4 °C) Oral 73 19 98 % --   12/08/18 2032 112/66 97.1 °F (36.2 °C) Oral 59 18 99 % --   12/08/18 2015 -- -- -- -- -- 98 % --   12/08/18 1515 116/75 97.3 °F (36.3 °C) Oral 60 18 98 % --       57.2 kg (126 lb)      Intake/Output Summary (Last 24 hours) at 12/9/2018 1338  Last data filed at 12/9/2018 1200  Gross per 24 hour   Intake 1060 ml   Output 500 ml   Net 560 ml       Review of Systems   Constitutional: Negative for activity change and appetite change.   HENT: Negative for congestion, postnasal drip, rhinorrhea and sinus pain.    Respiratory: Negative for chest tightness, " shortness of breath and wheezing.    Cardiovascular: Negative for chest pain.   Gastrointestinal: Negative for abdominal distention, diarrhea, nausea and vomiting.   Endocrine: Negative for polyphagia and polyuria.   Genitourinary: Negative for frequency.   Skin: Negative for rash.   Neurological: Negative for light-headedness.   Hematological: Does not bruise/bleed easily.   Psychiatric/Behavioral: Negative for agitation and behavioral problems.       Physical Exam   Constitutional: She appears well-developed and well-nourished.   HENT:   Head: Normocephalic.   Mouth/Throat: Oropharynx is clear and moist.   Eyes: Conjunctivae are normal.   Neck: Normal range of motion. No JVD present. No thyromegaly present.   Cardiovascular: Normal rate, regular rhythm and normal heart sounds.   No murmur heard.  Pulmonary/Chest: Effort normal. No respiratory distress. She has no wheezes.   Abdominal: Soft. Bowel sounds are normal. She exhibits no distension. There is no guarding.   Neurological: She is alert.   Skin: Skin is warm and dry. No rash noted.   Nursing note and vitals reviewed.      Medication Review:   I have reviewed the patient's current medication list  Scheduled Meds:    fluconazole 100 mg Oral Daily   pantoprazole 40 mg Oral BID AC   sodium chloride 3 mL Intravenous Q12H   sucralfate 1 g Oral 4x Daily AC & at Bedtime     Continuous Infusions:     PRN Meds:.•  acetaminophen  •  bisacodyl  •  calcium carbonate  •  HYDROmorphone  •  magnesium hydroxide  •  ondansetron  •  ondansetron  •  sodium chloride  •  sodium chloride  •  sodium chloride  @medsinfusion@      Labs:  Results from last 7 days   Lab Units  12/06/18   0338  12/05/18 0355  12/04/18   0237   WBC 10*3/mm3  7.80  8.90  13.64*   HEMOGLOBIN g/dL  9.7*  10.3*  12.7   HEMATOCRIT %  29.4*  30.7*  36.7*   PLATELETS 10*3/mm3  151  144  199     Results from last 7 days   Lab Units  12/07/18   0401  12/06/18   0338  12/05/18   0355  12/04/18   0237   SODIUM  mmol/L  142  141  141  138   POTASSIUM mmol/L  3.8  3.6  3.2*  3.8   CHLORIDE mmol/L  104  110*  106  96*   CO2 mmol/L  28.4  19.5*  22.9  22.5   BUN mg/dL  10  12  16  16   CREATININE mg/dL  0.91  0.89  0.89  1.09*   CALCIUM mg/dL  8.7*  8.0*  7.9*  9.7   BILIRUBIN mg/dL   --    --   0.6  0.9   ALK PHOS U/L   --    --   65  67   ALT (SGPT) U/L   --    --   50*  40*   AST (SGOT) U/L   --    --   53*  46*   GLUCOSE mg/dL  91  87  82  141*           Lab Results (last 24 hours)     ** No results found for the last 24 hours. **        Results from last 7 days   Lab Units  12/05/18   1338  12/04/18   0645  12/04/18   0237   TROPONIN T ng/mL   --   0.102*  0.141*   D DIMER QUANT MCGFEU/mL  1.53*   --    --          Results from last 7 days   Lab Units  12/06/18   0338   PROBNP pg/mL  8,147.0*                   Invalid input(s): LDLCALC          No results found for: POCGLU  Results from last 7 days   Lab Units  12/04/18   0702  12/04/18   0310   LACTATE mmol/L  0.8  3.4*         Results from last 7 days   Lab Units  12/04/18   0321   NITRITE UA   Negative   WBC UA /HPF  3-5*   BACTERIA UA /HPF  Trace*   SQUAM EPITHEL UA /HPF  3-6*             Radiology:  Imaging Results (last 24 hours)     Procedure Component Value Units Date/Time    NM HIDA Scan With Pharmacological Intervention [[id number]] Collected:  12/09/18 0849     Updated:  12/09/18 0855    Narrative:       HIDA SCAN     HISTORY:   Nausea and vomiting with increase in 2-3 pain attacks over the past  year. Patient reports prior ultrasound imaging demonstrated no evidence  of cholelithiasis. Evaluation of gallbladder function. Pain symptoms in  the right upper quadrant. Upper endoscopy demonstrated reflux disease.  Most recent right upper quadrant pain attack one week ago. Most symptoms  tend to occur after meals.     DOSE:   5.0 mCi technetium 99m Choletec. 1.1 mcg CCK     COMPARISON:   None     FINDINGS:   Initial image demonstrates expected uptake of radiotracer  by the liver.  Subsequent images demonstrate uptake within the common bile duct with  visualization of the gallbladder at the 15 minute mj and progressive  uptake corresponding to the gallbladder up to the 60 minute mj. This  is also confirmed on cine images. CCK was administered and ejection  fraction measured. Ejection fraction measures 89%, within normal limits.       Impression:       1. No evidence of cystic duct obstruction or acute cholecystitis.  2. Ejection fraction measures 89%, within normal limits.     This report was finalized on 12/9/2018 8:53 AM by Dr. Arnaldo Patel MD.             Cardiology:  ECG/EMG Results (last 24 hours)     Procedure Component Value Units Date/Time    ECG 12 Lead [[id number]] Collected:  12/05/18 1151     Updated:  12/05/18 1153    Narrative:       RR Interval= 870 ms  NC Interval= 116 ms  QRSD Interval= 77 ms  QT Interval= 417 ms  QTc Interval= 447 ms  Heart Rate= 69 ms  P Axis= -11 deg  QRS Axis= -35 deg  T Wave Axis= 65 deg  I: 40 Axis= 50 deg  T: 40 Axis= -48 deg  ST Axis= 58 deg  Sinus rhythm  Borderline short NC interval  Left axis deviation  Low voltage, extremity leads  Electronically Signed by:  Date and Time of Study: 2018-12-05 11:51:00    Adult Transthoracic Echo Complete W/ Cont if Necessary Per Protocol [[id number]] Collected:  12/05/18 0733     Updated:  12/05/18 1248     BSA 1.6 m^2      IVSd 0.75 cm      LVIDd 5.1 cm      LVIDs 3.4 cm      LVPWd 0.76 cm      IVS/LVPW 0.98     FS 33.0 %      EDV(Teich) 121.6 ml      ESV(Teich) 47.1 ml      EF(Teich) 61.3 %      EDV(cubed) 129.6 ml      ESV(cubed) 39.0 ml      EF(cubed) 69.9 %      LV mass(C)d 129.0 grams      LV mass(C)dI 80.0 grams/m^2      SV(Teich) 74.5 ml      SI(Teich) 46.2 ml/m^2      SV(cubed) 90.6 ml      SI(cubed) 56.2 ml/m^2      Ao root diam 3.1 cm      Ao root area 7.5 cm^2      ACS 1.6 cm      LA dimension 3.4 cm      LA/Ao 1.1     LVOT diam 1.8 cm      LVOT area 2.5 cm^2      LVOT area(traced) 2.5  cm^2      RVOT diam 1.9 cm      RVOT area 2.8 cm^2      LVLd ap4 7.5 cm      EDV(MOD-sp4) 89.4 ml      LVLs ap4 6.2 cm      ESV(MOD-sp4) 42.8 ml      EF(MOD-sp4) 52.1 %      LVLd ap2 7.6 cm      EDV(MOD-sp2) 82.8 ml      LVLs ap2 6.3 cm      ESV(MOD-sp2) 36.2 ml      EF(MOD-sp2) 56.3 %      SV(MOD-sp4) 46.6 ml      SI(MOD-sp4) 28.9 ml/m^2      SV(MOD-sp2) 46.6 ml      SI(MOD-sp2) 28.9 ml/m^2      Ao root area (BSA corrected) 1.9     LV Oliver Vol (BSA corrected) 55.4 ml/m^2      LV Sys Vol (BSA corrected) 26.5 ml/m^2      MV A dur 0.19 sec      MV E max jr 119.0 cm/sec      MV A max jr 52.6 cm/sec      MV E/A 2.3     MV V2 max 119.0 cm/sec      MV max PG 5.7 mmHg      MV V2 mean 61.5 cm/sec      MV mean PG 2.0 mmHg      MV V2 VTI 30.2 cm      MVA(VTI) 1.8 cm^2      MV P1/2t max jr 116.0 cm/sec      MV P1/2t 92.8 msec      MVA(P1/2t) 2.4 cm^2      MV dec slope 366.0 cm/sec^2      MV dec time 0.16 sec      Ao pk jr 133.0 cm/sec      Ao max PG 7.1 mmHg      Ao max PG (full) 2.1 mmHg      Ao V2 mean 92.3 cm/sec      Ao mean PG 4.0 mmHg      Ao mean PG (full) 2.0 mmHg      Ao V2 VTI 28.8 cm      LOREE(I,A) 1.9 cm^2      LOREE(I,D) 1.9 cm^2      LOREE(V,A) 2.1 cm^2      LOREE(V,D) 2.1 cm^2      LV V1 max PG 4.9 mmHg      LV V1 mean PG 2.0 mmHg      LV V1 max 111.0 cm/sec      LV V1 mean 66.5 cm/sec      LV V1 VTI 21.0 cm      MR max jr 413.0 cm/sec      MR max PG 68.2 mmHg      SV(Ao) 217.4 ml      SI(Ao) 134.8 ml/m^2      SV(LVOT) 53.4 ml      SV(RVOT) 41.7 ml      SI(LVOT) 33.1 ml/m^2      PA V2 max 103.0 cm/sec      PA max PG 4.2 mmHg      PA max PG (full) 2.6 mmHg      BH CV ECHO LIMA - PVA(V,A) 1.7 cm^2      BH CV ECHO LIMA - PVA(V,D) 1.7 cm^2      PA acc time 0.14 sec      RV V1 max PG 1.6 mmHg      RV V1 mean PG 1.0 mmHg      RV V1 max 63.5 cm/sec      RV V1 mean 39.6 cm/sec      RV V1 VTI 14.7 cm      TR max jr 425.0 cm/sec      RVSP(TR) 80.3 mmHg      RAP systole 8.0 mmHg      PA pr(Accel) 15.6 mmHg      Pulm Sys Jr  70.0 cm/sec      Pulm Oliver Jr 65.1 cm/sec      Pulm S/D 1.1     Qp/Qs 0.78     Pulm A Revs Dur 0.17 sec      Pulm A Revs Jr 42.7 cm/sec      MVA P1/2T LCG 1.9 cm^2       CV ECHO LIMA - BZI_BMI 21.8 kilograms/m^2       CV ECHO LIMA - BSA(HAYCOCK) 1.6 m^2       CV ECHO LIMA - BZI_METRIC_WEIGHT 57.6 kg       CV ECHO LIMA - BZI_METRIC_HEIGHT 162.6 cm      Target HR (85%) 132 bpm      Max. Pred. HR (100%) 155 bpm       CV VAS BP RIGHT /81 mmHg      TDI S' 17.00 cm/sec      RV Base 3.60 cm      LA volume 57.0 cm3      Dimensionless Index 0.8 (DI)      LA Volume Index 38.0 mL/m2      Avg E/e' ratio 10.35     EF(MOD-bp) 54.0 %      Lat Peak E' Jr 13.0 cm/sec      Med Peak E' Jr 10.00 cm/sec      TAPSE (>1.6) 3.00 cm2      Echo EF Estimated 54 %     Narrative:       · Left ventricular systolic function is normal. Estimated EF = 54%.  · The following left ventricular wall segments are hypokinetic: mid   inferior and basal inferior.  · Left atrial cavity size is mildly dilated.  · Left ventricular diastolic dysfunction (grade II) consistent with   pseudonormalization.  · Mild mitral valve regurgitation is present  · Moderate tricuspid valve regurgitation is present.  · Mild pulmonic valve regurgitation is present.  · Calculated right ventricular systolic pressure from tricuspid   regurgitation is 80.3 mmHg. Severe pulmonary hypertension is present.             I have reviewed recent labs results and consult notes.  Parts of this note may have been copied and pasted but patient was examined and interviewed by me today    Assessment and Plan:  1.  Recurrent esophagitis  both reflux and Candida will continue with present medications     2.   NSTEMI with echocardiographic findings and elevated troponin likely  a non-STEMI.  Plans for a right and left heart catheterization planned for Monday per cardiology    3.  Hypertension controlled nothing acute     4.  Severe pulmonary hypertension uncertain  etiology    5.  Fluid overload resolved

## 2018-12-09 NOTE — PLAN OF CARE
Problem: Patient Care Overview  Goal: Individualization and Mutuality   12/09/18 0345   Individualization   Patient Specific Preferences Patient was pleased to actually get sleep last night despite not having her trazadone.

## 2018-12-09 NOTE — PROGRESS NOTES
GI Daily Progress Note    Assessment/Plan:      Epigastric pain    Bilious vomiting with nausea    Type 2 myocardial infarction (CMS/HCC)    Esophagitis       LOS: 5 days     Lila Pabon is a 63 y.o. female who was admitted with Nausea vomiting. She reports the symptoms are improving with treatment. Tolerating small amounts of reg diet. Has been up and about her room and is still on PPi drip but will D/C that. Plans for Transfer for cath in the AM. Will folow up on path.    Subjective:    Patient expresses abdominal pain  Patient denies vomiting, diarrhea, difficulty swallowing and loss of appetite    Objective:    Vital signs in last 24 hours:  Temp:  [97.1 °F (36.2 °C)-97.7 °F (36.5 °C)] 97.7 °F (36.5 °C)  Heart Rate:  [59-79] 79  Resp:  [16-19] 16  BP: (112-126)/(66-75) 114/68    Intake/Output last 3 shifts:  I/O last 3 completed shifts:  In: 3218.3 [P.O.:840; I.V.:2183.3; IV Piggyback:195]  Out: -   Intake/Output this shift:  I/O this shift:  In: 480 [P.O.:480]  Out: -     Physical Exam:Abdomen  Sounds Normal Active Bowel Sounds   Distension Soft   Tenderness Mildly Tender     Imaging Results (last 72 hours)     Procedure Component Value Units Date/Time    NM HIDA Scan With Pharmacological Intervention [[id number]] Collected:  12/09/18 0849     Updated:  12/09/18 0855    Narrative:       HIDA SCAN     HISTORY:   Nausea and vomiting with increase in 2-3 pain attacks over the past  year. Patient reports prior ultrasound imaging demonstrated no evidence  of cholelithiasis. Evaluation of gallbladder function. Pain symptoms in  the right upper quadrant. Upper endoscopy demonstrated reflux disease.  Most recent right upper quadrant pain attack one week ago. Most symptoms  tend to occur after meals.     DOSE:   5.0 mCi technetium 99m Choletec. 1.1 mcg CCK     COMPARISON:   None     FINDINGS:   Initial image demonstrates expected uptake of radiotracer by the liver.  Subsequent images demonstrate uptake within the  common bile duct with  visualization of the gallbladder at the 15 minute mj and progressive  uptake corresponding to the gallbladder up to the 60 minute mj. This  is also confirmed on cine images. CCK was administered and ejection  fraction measured. Ejection fraction measures 89%, within normal limits.       Impression:       1. No evidence of cystic duct obstruction or acute cholecystitis.  2. Ejection fraction measures 89%, within normal limits.     This report was finalized on 12/9/2018 8:53 AM by Dr. rAnaldo Patel MD.             No results found for: WBC, RBC, HGB, HCT, MCV, MCH, MCHC, RDW, RDWSD, MPV, PLT, NEUTRORELPCT, LYMPHORELPCT, MONORELPCT, EOSRELPCT, BASORELPCT, AUTOIGPER, NEUTROABS, LYMPHSABS, MONOSABS, EOSABS, BASOSABS, AUTOIGNUM, NRBC    Glucose   Date Value Ref Range Status   12/07/2018 91 65 - 99 mg/dL Final     Sodium   Date Value Ref Range Status   12/07/2018 142 136 - 145 mmol/L Final     Potassium   Date Value Ref Range Status   12/07/2018 3.8 3.5 - 5.2 mmol/L Final     CO2   Date Value Ref Range Status   12/07/2018 28.4 22.0 - 29.0 mmol/L Final     Chloride   Date Value Ref Range Status   12/07/2018 104 98 - 107 mmol/L Final     Anion Gap   Date Value Ref Range Status   12/07/2018 9.6 mmol/L Final     Creatinine   Date Value Ref Range Status   12/07/2018 0.91 0.57 - 1.00 mg/dL Final     BUN   Date Value Ref Range Status   12/07/2018 10 8 - 23 mg/dL Final     BUN/Creatinine Ratio   Date Value Ref Range Status   12/07/2018 11.0 7.0 - 25.0 Final     Calcium   Date Value Ref Range Status   12/07/2018 8.7 (L) 8.8 - 10.5 mg/dL Final     eGFR Non  Amer   Date Value Ref Range Status   12/07/2018 62 >60 mL/min/1.73 Final     Candida Esophagitis  Reflux esophagitis  Nausea  Normal HIDA  Pulmonary Hypertension  Elevated BNP (from right heart strain?)       Will switch to BID PPI for now and will check on tomorrow at [facility]

## 2018-12-09 NOTE — PLAN OF CARE
Problem: Patient Care Overview  Goal: Plan of Care Review  Outcome: Ongoing (interventions implemented as appropriate)   12/09/18 0343   Coping/Psychosocial   Plan of Care Reviewed With patient   Plan of Care Review   Progress improving   OTHER   Outcome Summary No complaints of chest pain or discomfort overnight. No complaints of shortness of air. VSS and cardiac rhythm stable.       Problem: Nausea/Vomiting (Adult)  Goal: Symptom Relief  Outcome: Ongoing (interventions implemented as appropriate)    Goal: Adequate Hydration  Outcome: Ongoing (interventions implemented as appropriate)      Problem: Pain, Acute (Adult)  Goal: Identify Related Risk Factors and Signs and Symptoms  Outcome: Ongoing (interventions implemented as appropriate)    Goal: Acceptable Pain Control/Comfort Level  Outcome: Ongoing (interventions implemented as appropriate)

## 2018-12-10 VITALS
DIASTOLIC BLOOD PRESSURE: 81 MMHG | OXYGEN SATURATION: 99 % | TEMPERATURE: 97.9 F | BODY MASS INDEX: 20.12 KG/M2 | RESPIRATION RATE: 18 BRPM | SYSTOLIC BLOOD PRESSURE: 125 MMHG | HEIGHT: 65 IN | WEIGHT: 120.8 LBS | HEART RATE: 70 BPM

## 2018-12-10 PROCEDURE — G0378 HOSPITAL OBSERVATION PER HR: HCPCS

## 2018-12-10 PROCEDURE — 25010000002 FENTANYL CITRATE (PF) 100 MCG/2ML SOLUTION: Performed by: INTERNAL MEDICINE

## 2018-12-10 PROCEDURE — C1894 INTRO/SHEATH, NON-LASER: HCPCS | Performed by: INTERNAL MEDICINE

## 2018-12-10 PROCEDURE — 93460 R&L HRT ART/VENTRICLE ANGIO: CPT | Performed by: INTERNAL MEDICINE

## 2018-12-10 PROCEDURE — 85014 HEMATOCRIT: CPT

## 2018-12-10 PROCEDURE — 99152 MOD SED SAME PHYS/QHP 5/>YRS: CPT | Performed by: INTERNAL MEDICINE

## 2018-12-10 PROCEDURE — 99217 PR OBSERVATION CARE DISCHARGE MANAGEMENT: CPT | Performed by: INTERNAL MEDICINE

## 2018-12-10 PROCEDURE — 25010000002 MIDAZOLAM PER 1 MG: Performed by: INTERNAL MEDICINE

## 2018-12-10 PROCEDURE — 85018 HEMOGLOBIN: CPT

## 2018-12-10 PROCEDURE — 99153 MOD SED SAME PHYS/QHP EA: CPT | Performed by: INTERNAL MEDICINE

## 2018-12-10 PROCEDURE — 0 IOPAMIDOL PER 1 ML: Performed by: INTERNAL MEDICINE

## 2018-12-10 PROCEDURE — 25010000002 HEPARIN (PORCINE) PER 1000 UNITS: Performed by: INTERNAL MEDICINE

## 2018-12-10 PROCEDURE — C1769 GUIDE WIRE: HCPCS | Performed by: INTERNAL MEDICINE

## 2018-12-10 RX ORDER — FENTANYL CITRATE 50 UG/ML
INJECTION, SOLUTION INTRAMUSCULAR; INTRAVENOUS AS NEEDED
Status: DISCONTINUED | OUTPATIENT
Start: 2018-12-10 | End: 2018-12-10 | Stop reason: HOSPADM

## 2018-12-10 RX ORDER — MIDAZOLAM HYDROCHLORIDE 1 MG/ML
INJECTION INTRAMUSCULAR; INTRAVENOUS AS NEEDED
Status: DISCONTINUED | OUTPATIENT
Start: 2018-12-10 | End: 2018-12-10 | Stop reason: HOSPADM

## 2018-12-10 RX ORDER — SUCRALFATE 1 G/1
1 TABLET ORAL
Qty: 120 TABLET | Refills: 0 | Status: SHIPPED | OUTPATIENT
Start: 2018-12-10 | End: 2019-01-05 | Stop reason: SDUPTHER

## 2018-12-10 RX ORDER — SODIUM CHLORIDE 9 MG/ML
INJECTION, SOLUTION INTRAVENOUS CONTINUOUS PRN
Status: COMPLETED | OUTPATIENT
Start: 2018-12-10 | End: 2018-12-10

## 2018-12-10 RX ORDER — FLUCONAZOLE 100 MG/1
100 TABLET ORAL DAILY
Qty: 7 TABLET | Refills: 0 | Status: SHIPPED | OUTPATIENT
Start: 2018-12-11 | End: 2018-12-18

## 2018-12-10 RX ORDER — LIDOCAINE HYDROCHLORIDE 20 MG/ML
INJECTION, SOLUTION INFILTRATION; PERINEURAL AS NEEDED
Status: DISCONTINUED | OUTPATIENT
Start: 2018-12-10 | End: 2018-12-10 | Stop reason: HOSPADM

## 2018-12-10 RX ORDER — SODIUM CHLORIDE 9 MG/ML
50 INJECTION, SOLUTION INTRAVENOUS CONTINUOUS
Status: DISCONTINUED | OUTPATIENT
Start: 2018-12-10 | End: 2018-12-10 | Stop reason: HOSPADM

## 2018-12-10 RX ORDER — PANTOPRAZOLE SODIUM 40 MG/1
40 TABLET, DELAYED RELEASE ORAL
Qty: 60 TABLET | Refills: 2 | Status: SHIPPED | OUTPATIENT
Start: 2018-12-10 | End: 2019-02-11 | Stop reason: SDUPTHER

## 2018-12-10 RX ADMIN — SUCRALFATE 1 G: 1 TABLET ORAL at 06:47

## 2018-12-10 RX ADMIN — Medication 140 MG: at 08:29

## 2018-12-10 RX ADMIN — LOSARTAN POTASSIUM 25 MG: 25 TABLET, FILM COATED ORAL at 08:29

## 2018-12-10 RX ADMIN — SODIUM CHLORIDE, PRESERVATIVE FREE 3 ML: 5 INJECTION INTRAVENOUS at 08:30

## 2018-12-10 RX ADMIN — PANTOPRAZOLE SODIUM 40 MG: 40 TABLET, DELAYED RELEASE ORAL at 06:47

## 2018-12-10 RX ADMIN — Medication 81 MG: at 08:29

## 2018-12-10 RX ADMIN — FLUCONAZOLE 100 MG: 100 TABLET ORAL at 10:55

## 2018-12-10 RX ADMIN — SUCRALFATE 1 G: 1 TABLET ORAL at 10:55

## 2018-12-10 NOTE — NURSING NOTE
Case Management Discharge Note    Final Note: Patient transfered to [facility] to acute level of care.    Destination      No service has been selected for the patient.      Durable Medical Equipment      No service has been selected for the patient.      Dialysis/Infusion      No service has been selected for the patient.      Home Medical Care      No service has been selected for the patient.      Community Resources      No service has been selected for the patient.             Final Discharge Disposition Code: 02 - short [facility] for [facility]

## 2018-12-10 NOTE — PLAN OF CARE
Problem: Patient Care Overview  Goal: Individualization and Mutuality  Outcome: Outcome(s) achieved Date Met: 12/10/18    Goal: Discharge Needs Assessment  Outcome: Outcome(s) achieved Date Met: 12/10/18   12/10/18 1217   Discharge Needs Assessment   Readmission Within the Last 30 Days no previous admission in last 30 days   Concerns to be Addressed no discharge needs identified   Patient/Family Anticipates Transition to home;home with family   Patient/Family Anticipated Services at Transition none   Transportation Concerns car, none   Anticipated Changes Related to Illness none   Disability   Equipment Currently Used at Home none     Goal: Interprofessional Rounds/Family Conf  Outcome: Outcome(s) achieved Date Met: 12/10/18      Problem: Cardiac: ACS (Acute Coronary Syndrome) (Adult)  Goal: Signs and Symptoms of Listed Potential Problems Will be Absent, Minimized or Managed (Cardiac: ACS)  Outcome: Outcome(s) achieved Date Met: 12/10/18   12/10/18 1217   Goal/Outcome Evaluation   Problems Assessed (Acute Coronary Syndrome) all   Problems Present (Acute Coronary Syn) none

## 2018-12-10 NOTE — PLAN OF CARE
Problem: Patient Care Overview  Goal: Plan of Care Review  Outcome: Ongoing (interventions implemented as appropriate)   12/10/18 0529   Plan of Care Review   Progress no change   OTHER   Outcome Summary Pt admitted for observation and testing r/t elevated troponin level, NPO for cath this am, denies pain or discomfort this shift, vss     Goal: Discharge Needs Assessment  Outcome: Ongoing (interventions implemented as appropriate)      Problem: Cardiac: ACS (Acute Coronary Syndrome) (Adult)  Goal: Signs and Symptoms of Listed Potential Problems Will be Absent, Minimized or Managed (Cardiac: ACS)  Outcome: Ongoing (interventions implemented as appropriate)

## 2018-12-10 NOTE — PROGRESS NOTES
Hospital Follow Up      Chief Complaint: Follow up abnormal troponin, pulmonary hypertension    Interval History:  No significant abdominal pain.  Mild nausea.  No chest pain.     Objective:     Objective:  Temp:  [97.1 °F (36.2 °C)-97.9 °F (36.6 °C)] 97.1 °F (36.2 °C)  Heart Rate:  [60-79] 60  Resp:  [16] 16  BP: (106-123)/(61-84) 112/61     Intake/Output Summary (Last 24 hours) at 12/10/2018 0733  Last data filed at 12/9/2018 2035  Gross per 24 hour   Intake 840 ml   Output --   Net 840 ml     Body mass index is 20.42 kg/m².      12/09/18  1615   Weight: 54.8 kg (120 lb 12.8 oz)     Weight change:       Physical Exam:   General : Alert, cooperative, in no acute distress.  Neuro: alert,cooperative and oriented  Lungs: CTAB. Normal respiratory effort and rate.  CV:: Regular rate and rhythm, normal S1 and S2, no murmurs, gallops or rubs.  ABD: Soft, nontender, non-distended. positive bowel sounds  Extr: No edema or cyanosis, moves all extremities    Lab Review:   Results from last 7 days   Lab Units  12/07/18   0401  12/06/18   0338  12/05/18   0355  12/04/18   0237   SODIUM mmol/L  142  141  141  138   POTASSIUM mmol/L  3.8  3.6  3.2*  3.8   CHLORIDE mmol/L  104  110*  106  96*   CO2 mmol/L  28.4  19.5*  22.9  22.5   BUN mg/dL  10  12  16  16   CREATININE mg/dL  0.91  0.89  0.89  1.09*   GLUCOSE mg/dL  91  87  82  141*   CALCIUM mg/dL  8.7*  8.0*  7.9*  9.7   AST (SGOT) U/L   --    --   53*  46*   ALT (SGPT) U/L   --    --   50*  40*     Results from last 7 days   Lab Units  12/04/18   0645  12/04/18   0237   TROPONIN T ng/mL  0.102*  0.141*     Results from last 7 days   Lab Units  12/06/18   0338  12/05/18   0355   WBC 10*3/mm3  7.80  8.90   HEMOGLOBIN g/dL  9.7*  10.3*   HEMATOCRIT %  29.4*  30.7*   PLATELETS 10*3/mm3  151  144                   Invalid input(s): LDLCALC  Results from last 7 days   Lab Units  12/06/18   0338   PROBNP pg/mL  8,147.0*         I reviewed the patient's new clinical results.  I  personally viewed and interpreted the patient's EKG  Current Medications:   Scheduled Meds:  aspirin 81 mg Oral Daily   docusate sodium 100 mg Oral Nightly   estrogens (conjugated) 0.45 mg Oral Nightly   ferrous sulfate 140 mg Oral Daily With Breakfast   fluconazole 100 mg Oral Daily   losartan 25 mg Oral Daily   pantoprazole 40 mg Oral BID AC   sodium chloride 3 mL Intravenous Q12H   sucralfate 1 g Oral 4x Daily AC & at Bedtime   traZODone 100 mg Oral Nightly     Continuous Infusions:     Allergies:  Allergies   Allergen Reactions   • Mobic [Meloxicam] Rash       Assessment/Plan:     1. Epigastric discomfort  2. Candida esophagitis. On fluconazole per GI.    3. Abnormal troponin.  With inferior wall hypokinesis.    4. Pulmonary hypertension    -  For right and left heart catheterization today    Laquita Jessica MD  12/10/18  7:33 AM     ADDENDUM:  Cardiac catheterization with normal pulmonary pressures and coronary arteries.  Left ventricular angiogram with mid anterior wall hypokinesis similar to finding on cath in 6/2018.  Recurrent atypical stress induced cardiomyopathy?  Will discharge home today if ok with GI.

## 2018-12-10 NOTE — DISCHARGE SUMMARY
Hospital Discharge    Patient Name: Lila Pabon  Age/Sex: 63 y.o. female  : 1955  MRN: [id number]    Encounter Provider: Laquita Jessica MD  Referring Provider: Juan C Richey MD  Place of Service: [facility] CARDIOLOGY  Patient Care Team:  Jaskaran Zhang MD as PCP - General (Family Medicine)         Date of Discharge:  12/10/2018     Date of Admit: 2018    Discharge Condition: Stable    Discharge Diagnosis:  1. Candida esophagitis  2. Left ventricular mid anterolateral wall hypokinesis (with normal coronary arteries)    Hospital Course:   Lila Pabno is a 63 y.o. female with a history of esophagitis who was admitted to [facility] with complaints of severe epigastric pain and intractable vomiting.  On admission she was noted to have anterolateral T wave changes and elevated troponin.  She presented similarly in the past with esophagitis in 2018.  A cardiac catheterization at that time showed normal coronary arteries and a mild, mid anterolateral hypokinesis.  Her cardiac work up this admission included an echocardiogram that showed inferior wall hypokinesis with normal left ventricular systolic function, EF of 54%, grade 2 diastolic dysfunction and severe pulmonary hypertension.  GI saw the patient and after she failed medical management, underwent an EGD on 2018 that showed evidence of candida and reflux esophagitis.  HIDA scan was normal.  She was started on fluconazole and her protonix drip was switched to oral twice daily.  She was then transferred to [facility] on  for a right and left heart catheterization.      Cardiac catheterization on 12/10 showed normal coronary arteries, mild mid anterolateral wall hypokinesis and normal pulmonary pressures.  Her symptoms had largely improved at this point and she was discharged later that same day.     She will follow up with Dr. Zhang in 1-2 weeks, Dr. Bai in 4 weeks and  Dr. Rehman in 2 months.      Objective:  Temp:  [97.1 °F (36.2 °C)-97.9 °F (36.6 °C)] 97.9 °F (36.6 °C)  Heart Rate:  [60-70] 70  Resp:  [16-18] 18  BP: (112-125)/(61-84) 125/81    Intake/Output Summary (Last 24 hours) at 12/10/2018 1207  Last data filed at 12/10/2018 1146  Gross per 24 hour   Intake 1350 ml   Output --   Net 1350 ml     Body mass index is 20.42 kg/m².      12/09/18  1615   Weight: 54.8 kg (120 lb 12.8 oz)     Weight change:       Physical Exam:   General Appearance:    No acute distress, alert and oriented x4   Lungs:     Clear to auscultation bilaterally     Heart:    Regular rhythm and normal rate.  No murmurs, gallops, or       rubs.   Abdomen:     Soft, non-tender, non-distended.    Extremities:   Moves all extremities well.  No clubbing, cyanosis, or edema.       Procedures Performed  Procedure(s):  Right and Left Heart Cath       Consults:  Consults     Date and Time Order Name Status Description    12/9/2018 1638 Inpatient Cardiology Consult      12/4/2018 0528 Inpatient Gastroenterology Consult Completed           Pertinent Test Results:  Results from last 7 days   Lab Units  12/07/18   0401  12/06/18   0338  12/05/18   0355  12/04/18   0237   SODIUM mmol/L  142  141  141  138   POTASSIUM mmol/L  3.8  3.6  3.2*  3.8   CHLORIDE mmol/L  104  110*  106  96*   CO2 mmol/L  28.4  19.5*  22.9  22.5   BUN mg/dL  10  12  16  16   CREATININE mg/dL  0.91  0.89  0.89  1.09*   GLUCOSE mg/dL  91  87  82  141*   CALCIUM mg/dL  8.7*  8.0*  7.9*  9.7   AST (SGOT) U/L   --    --   53*  46*   ALT (SGPT) U/L   --    --   50*  40*     Results from last 7 days   Lab Units  12/04/18   0645  12/04/18   0237   TROPONIN T ng/mL  0.102*  0.141*     Results from last 7 days   Lab Units  12/06/18   0338  12/05/18   0355  12/04/18   0237   WBC 10*3/mm3  7.80  8.90  13.64*   HEMOGLOBIN g/dL  9.7*  10.3*  12.7   HEMATOCRIT %  29.4*  30.7*  36.7*   PLATELETS 10*3/mm3  151  144  199                   Invalid input(s):  LDLCALC  Results from last 7 days   Lab Units  12/06/18   0338   PROBNP pg/mL  8,147.0*           Discharge Medications     Discharge Medications      New Medications      Instructions Start Date   fluconazole 100 MG tablet  Commonly known as:  DIFLUCAN   100 mg, Oral, Daily      sucralfate 1 g tablet  Commonly known as:  CARAFATE   1 g, Oral, 4 Times Daily Before Meals & Nightly         Changes to Medications      Instructions Start Date   pantoprazole 40 MG EC tablet  Commonly known as:  PROTONIX  What changed:    · medication strength  · how much to take  · how to take this  · when to take this  · additional instructions   40 mg, Oral, 2 Times Daily Before Meals         Continue These Medications      Instructions Start Date   amitriptyline 25 MG tablet  Commonly known as:  ELAVIL   12.5 mg, Oral, Nightly      aspirin 81 MG chewable tablet   81 mg, Oral, Daily      CALCIUM 600+D 600-800 MG-UNIT tablet  Generic drug:  calcium carb-cholecalciferol   1 tablet, Oral, Nightly      docusate sodium 100 MG capsule  Commonly known as:  COLACE   100 mg, Oral, Nightly      estrogens (conjugated) 0.45 MG tablet  Commonly known as:  PREMARIN   0.45 mg, Oral, Nightly, Take daily for 21 days then do not take for 7 days.      ferrous sulfate 140 (45 Fe) MG tablet controlled-release tablet   140 mg, Oral, Daily With Breakfast      fluticasone 50 MCG/ACT nasal spray  Commonly known as:  FLONASE   2 sprays, Nasal, As Needed      ibuprofen 800 MG tablet  Commonly known as:  ADVIL,MOTRIN   800 mg, Oral, Every 8 Hours PRN, For arthritis       losartan 25 MG tablet  Commonly known as:  COZAAR   25 mg, Oral, Daily      traZODone 100 MG tablet  Commonly known as:  DESYREL   100 mg, Oral, Nightly             Discharge Diet:    Dietary Orders (From admission, onward)    Start     Ordered    12/10/18 1031  Diet Regular; Cardiac  Diet Effective Now     Question Answer Comment   Diet Texture / Consistency Regular    Common Modifiers Cardiac         12/10/18 1030          Activity at Discharge:  as tolerated     Discharge disposition: home     Discharge Instructions and Follow ups:  Future Appointments   Date Time Provider Department Center   2/11/2019  1:45 PM Laureano Rehman MD [facility] None     Additional Instructions for the Follow-ups that You Need to Schedule     Discharge Follow-up with PCP   As directed       Currently Documented PCP:    Jaskaran Zhang MD    PCP Phone Number:    [phone]     Follow Up Details:  1-2 weeks         Discharge Follow-up with Specified Provider: Dr. Rehman; 2 Months   As directed      To:  Dr. Rehman    Follow Up:  2 Months           Follow-up Information     Jaskaran Zhang MD .    Specialty:  Family Medicine  Why:  1-2 weeks  Contact information:  [address]  [place] [id number]  [phone]                      Laquita Jessica MD  12/10/18  12:07 PM    Time: Discharge 40 min

## 2018-12-11 LAB
CYTO UR: NORMAL
HCT VFR BLDA CALC: 31 % (ref 38–51)
HCT VFR BLDA CALC: 31 % (ref 38–51)
HGB BLDA-MCNC: 10.5 G/DL (ref 12–17)
HGB BLDA-MCNC: 10.5 G/DL (ref 12–17)
LAB AP CASE REPORT: NORMAL
PATH REPORT.FINAL DX SPEC: NORMAL
PATH REPORT.GROSS SPEC: NORMAL
SAO2 % BLDA: 68 % (ref 95–98)
SAO2 % BLDA: 98 % (ref 95–98)

## 2018-12-28 RX ORDER — PANTOPRAZOLE SODIUM 20 MG/1
TABLET, DELAYED RELEASE ORAL
Qty: 40 TABLET | Refills: 0 | OUTPATIENT
Start: 2018-12-28

## 2018-12-28 NOTE — TELEPHONE ENCOUNTER
Should have stopped this medication by now (was to taper following September EGD).  Also  Appears she has followup with Dr Rehman

## 2019-01-07 RX ORDER — SUCRALFATE 1 G/1
TABLET ORAL
Qty: 120 TABLET | Refills: 0 | Status: SHIPPED | OUTPATIENT
Start: 2019-01-07 | End: 2019-02-11 | Stop reason: SDUPTHER

## 2019-01-15 ENCOUNTER — OFFICE VISIT (OUTPATIENT)
Dept: CARDIOLOGY | Facility: CLINIC | Age: 64
End: 2019-01-15

## 2019-01-15 VITALS
HEIGHT: 64 IN | DIASTOLIC BLOOD PRESSURE: 76 MMHG | HEART RATE: 58 BPM | WEIGHT: 125 LBS | SYSTOLIC BLOOD PRESSURE: 116 MMHG | BODY MASS INDEX: 21.34 KG/M2

## 2019-01-15 DIAGNOSIS — I51.81 STRESS-INDUCED CARDIOMYOPATHY: Primary | ICD-10-CM

## 2019-01-15 DIAGNOSIS — I21.A1 TYPE 2 MYOCARDIAL INFARCTION (HCC): ICD-10-CM

## 2019-01-15 DIAGNOSIS — K20.90 ESOPHAGITIS: ICD-10-CM

## 2019-01-15 PROBLEM — R11.14 BILIOUS VOMITING WITH NAUSEA: Status: RESOLVED | Noted: 2018-06-26 | Resolved: 2019-01-15

## 2019-01-15 PROBLEM — R10.13 EPIGASTRIC PAIN: Status: RESOLVED | Noted: 2018-06-25 | Resolved: 2019-01-15

## 2019-01-15 PROCEDURE — 99213 OFFICE O/P EST LOW 20 MIN: CPT | Performed by: INTERNAL MEDICINE

## 2019-01-15 PROCEDURE — 93000 ELECTROCARDIOGRAM COMPLETE: CPT | Performed by: INTERNAL MEDICINE

## 2019-01-15 NOTE — PROGRESS NOTES
Date of Office Visit: 01/15/2019  Encounter Provider: Vincent Bai MD  Place of Service: [facility] CARDIOLOGY  Patient Name: Lila Pabon  :1955    Chief Complaint   Patient presents with   • Cardiomyopathy   :     HPI: Lila Pabon is a 63 y.o. female who presents today to follow up.    In 2018 she presented with terrible epigastric pain, nausea, and vomiting.  Her troponin was elevated.  An echo showed subtle anterolateral hypokinesis, LVEF 57%, and mild-moderate pulmonary hypertension.  Coronary angiography was normal.  I suspected this was a variant of stress induced cardiomyopathy.  She was diagnosed with grade IV esophagitis.     In 2018, her PPI dose was decreased.  She again presented with severe epigastric pain, nausea, and vomiting.  Her troponin was again elevated. She now had new anterolateral T wave inversions on EKG; an echo showed more pronounced lateral hypokinesis, LVEF 54%, and severe PHTN (80 mm Hg).    She underwent another coronary angiogram, which was normal. She had a right heart cath as well, and her pressures were completely normal (RVSP 21 mm Hg).  She was again diagnosed with esophagitis.    She is doing really now!  She has no cardiac complaints.     Past Medical History:   Diagnosis Date   • Anemia     IRON SUPPLEMENTS   • Arthritis    • Esophagitis 2018   • GERD (gastroesophageal reflux disease)    • Hypertension    • Stress-induced cardiomyopathy 1/15/2019   • Type 2 myocardial infarction (CMS/HCC) 1/15/2019       Past Surgical History:   Procedure Laterality Date   • CARDIAC CATHETERIZATION N/A 2018    Procedure: Left Heart Cath and Cors;  Surgeon: Suhas Almonte MD;  Location: [facility] CATH INVASIVE LOCATION;  Service: Cardiovascular   • CARDIAC CATHETERIZATION N/A 2018    Procedure: Left ventriculography;  Surgeon: Suhas Almonte MD;  Location:  [facility] CATH INVASIVE LOCATION;  Service: Cardiovascular    • CARDIAC CATHETERIZATION N/A 12/10/2018    Procedure: Right and Left Heart Cath;  Surgeon: Laquita Jessica MD;  [facility];  Service: Cardiology   • CARDIAC CATHETERIZATION N/A 12/10/2018    Procedure: Coronary angiography;  Surgeon: Laquita Jessica MD;  Location:  [facility] CATH INVASIVE LOCATION;  Service: Cardiology   • CARDIAC CATHETERIZATION N/A 12/10/2018    Procedure: Left ventriculography;  Surgeon: Laquita Jessica MD;  Location:  [facility] CATH INVASIVE LOCATION;  Service: Cardiology   • CATARACT EXTRACTION Bilateral    • CATARACT EXTRACTION     • ENDOSCOPY N/A 2018    Procedure: ESOPHAGOGASTRODUODENOSCOPY with biopsies;  Surgeon: Rupa Hays MD;  [facility];  Service: Gastroenterology   • ENDOSCOPY N/A 2018    Procedure: ESOPHAGOGASTRODUODENOSCOPY with biopsies;  Surgeon: Rupa Hays MD;  [facility];  Service: Gastroenterology   • ENDOSCOPY N/A 2018    Procedure: ESOPHAGOGASTRODUODENOSCOPY JWITH BIOPSIES;  Surgeon: Laureano Rehman MD;  Location: [facility] OR;  Service: Gastroenterology   • GLAUCOMA SURGERY     • HYSTERECTOMY     • SHOULDER SURGERY Right     RUPTURED BICEP TENDON       Social History     Socioeconomic History   • Marital status:      Spouse name: Not on file   • Number of children: Not on file   • Years of education: Not on file   • Highest education level: Not on file   Social Needs   • Financial resource strain: Not on file   • Food insecurity - worry: Not on file   • Food insecurity - inability: Not on file   • Transportation needs - medical: Not on file   • Transportation needs - non-medical: Not on file   Occupational History   • Not on file   Tobacco Use   • Smoking status: Former Smoker     Years: 44.00     Types: Cigarettes     Last attempt to quit: 2010     Years since quittin.0   • Smokeless tobacco: Never Used   Substance and Sexual Activity   • Alcohol use: Yes      "Comment: Rare   • Drug use: No   • Sexual activity: Defer   Other Topics Concern   • Not on file   Social History Narrative   • Not on file       Family History   Problem Relation Age of Onset   • Breast cancer Mother    • Breast cancer Maternal Aunt        Review of Systems   All other systems reviewed and are negative.      Allergies   Allergen Reactions   • Mobic [Meloxicam] Rash         Current Outpatient Medications:   •  amitriptyline (ELAVIL) 25 MG tablet, Take 12.5 mg by mouth Every Night., Disp: , Rfl:   •  aspirin 81 MG chewable tablet, Chew 81 mg Daily., Disp: , Rfl:   •  calcium carb-cholecalciferol (CALCIUM 600+D) 600-800 MG-UNIT tablet, Take 1 tablet by mouth Every Night., Disp: , Rfl:   •  docusate sodium (COLACE) 100 MG capsule, Take 100 mg by mouth Every Night., Disp: , Rfl:   •  estrogens, conjugated, (PREMARIN) 0.45 MG tablet, Take 0.45 mg by mouth Every Night. Take daily for 21 days then do not take for 7 days. , Disp: , Rfl:   •  ferrous sulfate 140 (45 Fe) MG tablet controlled-release tablet, Take 140 mg by mouth Daily With Breakfast., Disp: , Rfl:   •  fluticasone (FLONASE) 50 MCG/ACT nasal spray, 2 sprays into the nostril(s) as directed by provider As Needed for Rhinitis., Disp: , Rfl:   •  ibuprofen (ADVIL,MOTRIN) 800 MG tablet, Take 800 mg by mouth Every 8 (Eight) Hours As Needed for Mild Pain . For arthritis, Disp: , Rfl:   •  losartan (COZAAR) 25 MG tablet, Take 25 mg by mouth Daily., Disp: , Rfl:   •  pantoprazole (PROTONIX) 40 MG EC tablet, Take 1 tablet by mouth 2 (Two) Times a Day Before Meals., Disp: 60 tablet, Rfl: 2  •  sucralfate (CARAFATE) 1 g tablet, TAKE ONE TABLET BY MOUTH FOUR TIMES A DAY BEFORE MEALS AND AT BEDTIME, Disp: 120 tablet, Rfl: 0  •  traZODone (DESYREL) 100 MG tablet, Take 100 mg by mouth Every Night., Disp: , Rfl:       Objective:     Vitals:    01/15/19 1125   BP: 116/76   Pulse: 58   Weight: 56.7 kg (125 lb)   Height: 162.6 cm (64\")     Body mass index is 21.46 " kg/m².    Physical Exam   Constitutional: She is oriented to person, place, and time. She appears well-developed and well-nourished.   HENT:   Head: Normocephalic.   Nose: Nose normal.   Mouth/Throat: Oropharynx is clear and moist.   Eyes: Conjunctivae and EOM are normal. Pupils are equal, round, and reactive to light.   Neck: Normal range of motion. No JVD present.   Cardiovascular: Normal rate, regular rhythm, normal heart sounds and intact distal pulses.   No murmur heard.  Pulmonary/Chest: Effort normal and breath sounds normal.   Abdominal: Soft. She exhibits no mass. There is no tenderness.   Musculoskeletal: Normal range of motion. She exhibits no edema.   Lymphadenopathy:     She has no cervical adenopathy.   Neurological: She is alert and oriented to person, place, and time. No cranial nerve deficit.   Skin: Skin is warm and dry. No rash noted.   Psychiatric: She has a normal mood and affect. Her behavior is normal. Judgment and thought content normal.   Vitals reviewed.        ECG 12 Lead  Date/Time: 1/15/2019 2:31 PM  Performed by: Vincent Bai MD  Authorized by: Vincent Bai MD   Comparison: compared with previous ECG   Similar to previous ECG  Rhythm: sinus rhythm  Conduction: conduction normal  ST Segments: ST segments normal  T Waves: T waves normal  QRS axis: normal  Other: no other findings  Clinical impression: normal ECG              Assessment:       Diagnosis Plan   1. Stress-induced cardiomyopathy  Adult Transthoracic Echo Limited W/ Cont if Necessary Per Protocol   2. Type 2 myocardial infarction (CMS/HCC)     3. Esophagitis            Plan:       She has had variant stress-induced cardiomyopathy twice in the setting of severe esophagitis.  She also had PHTN by echo but normal pressures by RHC.  She will have a follow up limited echo in three months to re-evaluate LVEF/wall motion but I suspect she needs no further workup.    Sincerely,       Vincent Bai MD

## 2019-02-04 RX ORDER — SUCRALFATE 1 G/1
TABLET ORAL
Qty: 120 TABLET | Refills: 0 | OUTPATIENT
Start: 2019-02-04

## 2019-02-11 ENCOUNTER — OFFICE VISIT (OUTPATIENT)
Dept: GASTROENTEROLOGY | Facility: CLINIC | Age: 64
End: 2019-02-11

## 2019-02-11 VITALS — WEIGHT: 127.8 LBS | BODY MASS INDEX: 21.82 KG/M2 | HEIGHT: 64 IN

## 2019-02-11 DIAGNOSIS — I51.81 STRESS-INDUCED CARDIOMYOPATHY: ICD-10-CM

## 2019-02-11 DIAGNOSIS — D12.6 ADENOMATOUS POLYP OF COLON, UNSPECIFIED PART OF COLON: ICD-10-CM

## 2019-02-11 DIAGNOSIS — K21.00 GASTROESOPHAGEAL REFLUX DISEASE WITH ESOPHAGITIS: Primary | ICD-10-CM

## 2019-02-11 PROCEDURE — 99213 OFFICE O/P EST LOW 20 MIN: CPT | Performed by: INTERNAL MEDICINE

## 2019-02-11 RX ORDER — PANTOPRAZOLE SODIUM 40 MG/1
40 TABLET, DELAYED RELEASE ORAL DAILY
Qty: 90 TABLET | Refills: 3 | Status: SHIPPED | OUTPATIENT
Start: 2019-02-11 | End: 2020-02-13 | Stop reason: SDUPTHER

## 2019-02-11 RX ORDER — SUCRALFATE 1 G/1
1 TABLET ORAL
Qty: 120 TABLET | Refills: 10 | Status: SHIPPED | OUTPATIENT
Start: 2019-02-11 | End: 2020-02-13 | Stop reason: SDUPTHER

## 2019-02-11 NOTE — PROGRESS NOTES
PATIENT INFORMATION  Lila Pabon       - 1955    CHIEF COMPLAINT  Chief Complaint   Patient presents with   • Follow-up     2 mo follow up on EGD       HISTORY OF PRESENT ILLNESS  Here from hospital had reflux and Candida but also was sent for Catrh which despite her Hypokinesis was normal, to follow with ECHOs    Her heart burn is well controll on BID PPi and is done with her Carafate     We reviewed her diet recommendation and you let her try QDay dosing and PRN carafate            REVIEW OF SYSTEMS  Review of Systems   HENT: Positive for congestion, ear pain, rhinorrhea, sinus pressure, sneezing and sore throat.    Respiratory: Positive for cough.    Musculoskeletal: Positive for arthralgias, back pain, joint swelling, neck pain and neck stiffness.   Hematological: Bruises/bleeds easily.   All other systems reviewed and are negative.        ACTIVE PROBLEMS  Patient Active Problem List    Diagnosis   • Gastroesophageal reflux disease with esophagitis [K21.0]   • Adenomatous polyp of colon [D12.6]   • Stress-induced cardiomyopathy [I51.81]   • Type 2 myocardial infarction (CMS/HCC) [I21.A1]   • Esophagitis [K20.9]         PAST MEDICAL HISTORY  Past Medical History:   Diagnosis Date   • Anemia     IRON SUPPLEMENTS   • Arthritis    • Colon polyp    • Esophagitis 2018   • GERD (gastroesophageal reflux disease)    • Hypertension    • Stress-induced cardiomyopathy 1/15/2019   • Type 2 myocardial infarction (CMS/HCC) 1/15/2019         SURGICAL HISTORY  Past Surgical History:   Procedure Laterality Date   • CARDIAC CATHETERIZATION N/A 2018    Procedure: Left Heart Cath and Cors;  Surgeon: Suhas Almonte MD;  Location: [facility] INVASIVE LOCATION;  Service: Cardiovascular   • CARDIAC CATHETERIZATION N/A 2018    Procedure: Left ventriculography;  Surgeon: Suhas Almonte MD;  Location: [facility] CATH INVASIVE LOCATION;  Service: Cardiovascular   • CARDIAC CATHETERIZATION N/A  12/10/2018    Procedure: Right and Left Heart Cath;  Surgeon: Laquita Jessica MD;  Location: [facility] CATH INVASIVE LOCATION;  Service: Cardiology   • CARDIAC CATHETERIZATION N/A 12/10/2018    Procedure: Coronary angiography;  Surgeon: Laquita Jessica MD;  [facility];  Service: Cardiology   • CARDIAC CATHETERIZATION N/A 12/10/2018    Procedure: Left ventriculography;  Surgeon: Laquita Jessica MD;  [facility];  Service: Cardiology   • CATARACT EXTRACTION Bilateral    • CATARACT EXTRACTION     • COLONOSCOPY     • ENDOSCOPY N/A 2018    Procedure: ESOPHAGOGASTRODUODENOSCOPY with biopsies;  Surgeon: Rupa Hays MD;  [facility];  Service: Gastroenterology   • ENDOSCOPY N/A 2018    Procedure: ESOPHAGOGASTRODUODENOSCOPY with biopsies;  Surgeon: Rupa Hays MD;  [facility];  Service: Gastroenterology   • ENDOSCOPY N/A 2018    Procedure: ESOPHAGOGASTRODUODENOSCOPY JWITH BIOPSIES;  Surgeon: Laureano Rehman MD;  Location: [facility] OR;  Service: Gastroenterology   • GLAUCOMA SURGERY     • HYSTERECTOMY     • SHOULDER SURGERY Right     RUPTURED BICEP TENDON         FAMILY HISTORY  Family History   Problem Relation Age of Onset   • Breast cancer Mother    • Breast cancer Maternal Aunt    • Colon polyps Son    • Colon cancer Neg Hx          SOCIAL HISTORY  Social History     Occupational History   • Not on file   Tobacco Use   • Smoking status: Former Smoker     Years: 44.00     Types: Cigarettes     Last attempt to quit: 2010     Years since quittin.1   • Smokeless tobacco: Never Used   Substance and Sexual Activity   • Alcohol use: Yes     Comment: Rare   • Drug use: No   • Sexual activity: Defer       Debilities/Disabilities Identified: None    Emotional Behavior: Appropriate    CURRENT MEDICATIONS    Current Outpatient Medications:   •  amitriptyline (ELAVIL) 25 MG tablet, Take 12.5 mg by mouth  "Every Night., Disp: , Rfl:   •  aspirin 81 MG chewable tablet, Chew 81 mg Daily., Disp: , Rfl:   •  calcium carb-cholecalciferol (CALCIUM 600+D) 600-800 MG-UNIT tablet, Take 1 tablet by mouth Every Night., Disp: , Rfl:   •  docusate sodium (COLACE) 100 MG capsule, Take 100 mg by mouth Every Night., Disp: , Rfl:   •  estrogens, conjugated, (PREMARIN) 0.45 MG tablet, Take 0.45 mg by mouth Every Night. Take daily for 21 days then do not take for 7 days. , Disp: , Rfl:   •  ferrous sulfate 140 (45 Fe) MG tablet controlled-release tablet, Take 140 mg by mouth Daily With Breakfast., Disp: , Rfl:   •  fluticasone (FLONASE) 50 MCG/ACT nasal spray, 2 sprays into the nostril(s) as directed by provider As Needed for Rhinitis., Disp: , Rfl:   •  ibuprofen (ADVIL,MOTRIN) 800 MG tablet, Take 800 mg by mouth Every 8 (Eight) Hours As Needed for Mild Pain . For arthritis, Disp: , Rfl:   •  losartan (COZAAR) 25 MG tablet, Take 25 mg by mouth Daily., Disp: , Rfl:   •  pantoprazole (PROTONIX) 40 MG EC tablet, Take 1 tablet by mouth Daily., Disp: 90 tablet, Rfl: 3  •  sucralfate (CARAFATE) 1 g tablet, Take 1 tablet by mouth 4 (Four) Times a Day Before Meals & at Bedtime As Needed (Heartburn)., Disp: 120 tablet, Rfl: 10  •  traZODone (DESYREL) 100 MG tablet, Take 100 mg by mouth Every Night., Disp: , Rfl:     ALLERGIES  Mobic [meloxicam]    VITALS  Vitals:    02/11/19 1349   Weight: 58 kg (127 lb 12.8 oz)   Height: 162.6 cm (64.02\")       LAST RESULTS   Admission on 12/09/2018, Discharged on 12/10/2018   Component Date Value Ref Range Status   • Hemoglobin 12/10/2018 10.5* 12.0 - 17.0 g/dL Final   • Hematocrit 12/10/2018 31* 38 - 51 % Final   • O2 Saturation, Arterial 12/10/2018 68* 95 - 98 % Final    Serial Number: [id number]:  [id number]   • Hemoglobin 12/10/2018 10.5* 12.0 - 17.0 g/dL Final   • Hematocrit 12/10/2018 31* 38 - 51 % Final   • O2 Saturation, Arterial 12/10/2018 98  95 - 98 % Final    Serial Number: [id number]:  [id number]     No results found.    PHYSICAL EXAM  Physical Exam   Constitutional: She is oriented to person, place, and time. She appears well-developed and well-nourished.   HENT:   Head: Normocephalic and atraumatic.   Eyes: Conjunctivae and EOM are normal. Pupils are equal, round, and reactive to light. No scleral icterus.   Neck: Normal range of motion. Neck supple. No thyromegaly present.   Cardiovascular: Normal rate, regular rhythm, normal heart sounds and intact distal pulses. Exam reveals no gallop.   No murmur heard.  Pulmonary/Chest: Effort normal and breath sounds normal. She has no wheezes. She has no rales.   Abdominal: Soft. Bowel sounds are normal. She exhibits no shifting dullness, no distension, no fluid wave, no abdominal bruit, no ascites and no mass. There is no hepatosplenomegaly. There is no tenderness. There is no guarding and negative Mcmahon's sign. Hernia confirmed negative in the ventral area.   Musculoskeletal: Normal range of motion. She exhibits no edema.   Lymphadenopathy:     She has no cervical adenopathy.   Neurological: She is alert and oriented to person, place, and time.   Skin: Skin is warm and dry. No rash noted. She is not diaphoretic. No erythema.   Psychiatric: She has a normal mood and affect. Her behavior is normal.       ASSESSMENT  Diagnoses and all orders for this visit:    Gastroesophageal reflux disease with esophagitis    Adenomatous polyp of colon, unspecified part of colon    Stress-induced cardiomyopathy    Other orders  -     pantoprazole (PROTONIX) 40 MG EC tablet; Take 1 tablet by mouth Daily.  -     sucralfate (CARAFATE) 1 g tablet; Take 1 tablet by mouth 4 (Four) Times a Day Before Meals & at Bedtime As Needed (Heartburn).          PLAN  Get colon results from 2017, in [place] and was normal but has had polyps  3/2022    Return in about 6 months (around 8/11/2019).

## 2019-03-05 ENCOUNTER — APPOINTMENT (OUTPATIENT)
Dept: CARDIOLOGY | Facility: HOSPITAL | Age: 64
End: 2019-03-05
Attending: INTERNAL MEDICINE

## 2019-03-06 RX ORDER — PANTOPRAZOLE SODIUM 40 MG/1
TABLET, DELAYED RELEASE ORAL
Qty: 60 TABLET | Refills: 1 | OUTPATIENT
Start: 2019-03-06

## 2019-03-08 ENCOUNTER — HOSPITAL ENCOUNTER (OUTPATIENT)
Dept: CARDIOLOGY | Facility: HOSPITAL | Age: 64
Discharge: HOME | End: 2019-03-08
Attending: INTERNAL MEDICINE | Admitting: INTERNAL MEDICINE

## 2019-03-08 VITALS — BODY MASS INDEX: 21.68 KG/M2 | HEIGHT: 64 IN | WEIGHT: 127 LBS

## 2019-03-08 DIAGNOSIS — I51.81 STRESS-INDUCED CARDIOMYOPATHY: ICD-10-CM

## 2019-03-08 LAB
BH CV ECHO MEAS - AO ROOT AREA (BSA CORRECTED): 1.7
BH CV ECHO MEAS - AO ROOT AREA: 6.2 CM^2
BH CV ECHO MEAS - AO ROOT DIAM: 2.8 CM
BH CV ECHO MEAS - BSA(HAYCOCK): 1.6 M^2
BH CV ECHO MEAS - BSA: 1.6 M^2
BH CV ECHO MEAS - BZI_BMI: 21.8 KILOGRAMS/M^2
BH CV ECHO MEAS - BZI_METRIC_HEIGHT: 162.6 CM
BH CV ECHO MEAS - BZI_METRIC_WEIGHT: 57.6 KG
BH CV ECHO MEAS - EDV(CUBED): 80.6 ML
BH CV ECHO MEAS - EDV(MOD-SP2): 86.6 ML
BH CV ECHO MEAS - EDV(MOD-SP4): 53 ML
BH CV ECHO MEAS - EDV(TEICH): 84 ML
BH CV ECHO MEAS - EF(CUBED): 80.6 %
BH CV ECHO MEAS - EF(MOD-BP): 52 %
BH CV ECHO MEAS - EF(MOD-SP2): 58 %
BH CV ECHO MEAS - EF(MOD-SP4): 52.8 %
BH CV ECHO MEAS - EF(TEICH): 73.4 %
BH CV ECHO MEAS - ESV(CUBED): 15.6 ML
BH CV ECHO MEAS - ESV(MOD-SP2): 36.4 ML
BH CV ECHO MEAS - ESV(MOD-SP4): 25 ML
BH CV ECHO MEAS - ESV(TEICH): 22.3 ML
BH CV ECHO MEAS - FS: 42.1 %
BH CV ECHO MEAS - IVS/LVPW: 1.1
BH CV ECHO MEAS - IVSD: 0.92 CM
BH CV ECHO MEAS - LA DIMENSION: 2.7 CM
BH CV ECHO MEAS - LA/AO: 0.96
BH CV ECHO MEAS - LV DIASTOLIC VOL/BSA (35-75): 32.9 ML/M^2
BH CV ECHO MEAS - LV MASS(C)D: 119.4 GRAMS
BH CV ECHO MEAS - LV MASS(C)DI: 74 GRAMS/M^2
BH CV ECHO MEAS - LV SYSTOLIC VOL/BSA (12-30): 15.5 ML/M^2
BH CV ECHO MEAS - LVIDD: 4.3 CM
BH CV ECHO MEAS - LVIDS: 2.5 CM
BH CV ECHO MEAS - LVLD AP2: 6.5 CM
BH CV ECHO MEAS - LVLD AP4: 6.5 CM
BH CV ECHO MEAS - LVLS AP2: 5.5 CM
BH CV ECHO MEAS - LVLS AP4: 4.5 CM
BH CV ECHO MEAS - LVOT AREA (M): 3.5 CM^2
BH CV ECHO MEAS - LVOT AREA: 3.5 CM^2
BH CV ECHO MEAS - LVOT DIAM: 2.1 CM
BH CV ECHO MEAS - LVPWD: 0.83 CM
BH CV ECHO MEAS - RVOT AREA: 2.5 CM^2
BH CV ECHO MEAS - RVOT DIAM: 1.8 CM
BH CV ECHO MEAS - SI(CUBED): 40.3 ML/M^2
BH CV ECHO MEAS - SI(MOD-SP2): 31.1 ML/M^2
BH CV ECHO MEAS - SI(MOD-SP4): 17.4 ML/M^2
BH CV ECHO MEAS - SI(TEICH): 38.2 ML/M^2
BH CV ECHO MEAS - SV(CUBED): 65 ML
BH CV ECHO MEAS - SV(MOD-SP2): 50.2 ML
BH CV ECHO MEAS - SV(MOD-SP4): 28 ML
BH CV ECHO MEAS - SV(TEICH): 61.7 ML
BH CV ECHO MEAS - TR MAX VEL: 220 CM/SEC
BH CV VAS BP RIGHT ARM: NORMAL MMHG
MAXIMAL PREDICTED HEART RATE: 155 BPM
STRESS TARGET HR: 132 BPM

## 2019-03-08 PROCEDURE — 93325 DOPPLER ECHO COLOR FLOW MAPG: CPT | Performed by: INTERNAL MEDICINE

## 2019-03-08 PROCEDURE — 93308 TTE F-UP OR LMTD: CPT | Performed by: INTERNAL MEDICINE

## 2019-03-08 PROCEDURE — 93325 DOPPLER ECHO COLOR FLOW MAPG: CPT

## 2019-03-08 PROCEDURE — 93308 TTE F-UP OR LMTD: CPT

## 2019-04-23 ENCOUNTER — OFFICE VISIT (OUTPATIENT)
Dept: SURGERY | Facility: CLINIC | Age: 64
End: 2019-04-23

## 2019-04-23 VITALS
HEIGHT: 64 IN | SYSTOLIC BLOOD PRESSURE: 132 MMHG | RESPIRATION RATE: 18 BRPM | DIASTOLIC BLOOD PRESSURE: 70 MMHG | WEIGHT: 126 LBS | BODY MASS INDEX: 21.51 KG/M2

## 2019-04-23 DIAGNOSIS — L72.3 SEBACEOUS CYST: Primary | ICD-10-CM

## 2019-04-23 PROCEDURE — 99202 OFFICE O/P NEW SF 15 MIN: CPT | Performed by: SURGERY

## 2019-04-23 NOTE — PROGRESS NOTES
PATIENT INFORMATION  Lila Pabon       - 1955    CHIEF COMPLAINT  Chief Complaint   Patient presents with   • Cyst     left cheek   left cheek cyst x 2 years - states it is getting bigger over time and it is sore to touch    HISTORY OF PRESENT ILLNESS  HPI mass on her left facial cheek that has been increasing over the last 2 years.  She denies any drainage from the site.  It has not been infected.  She says it is tender.  She had a prior mass on her left face that required multiple surgeries many years ago and a drain placement        REVIEW OF SYSTEMS  Review of Systems she is now off the ibuprofen stomach issues otherwise negative      ACTIVE PROBLEMS  Patient Active Problem List    Diagnosis   • Gastroesophageal reflux disease with esophagitis [K21.0]   • Adenomatous polyp of colon [D12.6]   • Stress-induced cardiomyopathy [I51.81]   • Type 2 myocardial infarction (CMS/HCC) [I21.A1]   • Esophagitis [K20.9]         PAST MEDICAL HISTORY  Past Medical History:   Diagnosis Date   • Anemia     IRON SUPPLEMENTS   • Arthritis    • Colon polyp    • Esophagitis 2018   • GERD (gastroesophageal reflux disease)    • Hypertension    • Stress-induced cardiomyopathy 1/15/2019   • Type 2 myocardial infarction (CMS/HCC) 1/15/2019         SURGICAL HISTORY  Past Surgical History:   Procedure Laterality Date   • CARDIAC CATHETERIZATION N/A 2018    Procedure: Left Heart Cath and Cors;  Surgeon: Suhas Almonte MD;  Location:  [facility] INV[facility];  Service: Cardiovascular   • CARDIAC CATHETERIZATION N/A 2018    Procedure: Left ventriculography;  Surgeon: Suhas Almonte MD;  Location:  [facility];  Service: Cardiovascular   • CARDIAC CATHETERIZATION N/A 12/10/2018    Procedure: Right and Left Heart Cath;  Surgeon: Laquita Jessica MD;  Location:  [facility];  Service: Cardiology   • CARDIAC CATHETERIZATION N/A 12/10/2018    Procedure: Coronary  angiography;  Surgeon: Laquita Jessica MD;  Location:  [facility];  Service: Cardiology   • CARDIAC CATHETERIZATION N/A 12/10/2018    Procedure: Left ventriculography;  Surgeon: Laquita Jessica MD;  Location:  [facility];  Service: Cardiology   • CATARACT EXTRACTION Bilateral    • CATARACT EXTRACTION     • COLONOSCOPY     • ENDOSCOPY N/A 2018    Procedure: ESOPHAGOGASTRODUODENOSCOPY with biopsies;  Surgeon: Rupa Hays MD;  Location:  [facility];  Service: Gastroenterology   • ENDOSCOPY N/A 2018    Procedure: ESOPHAGOGASTRODUODENOSCOPY with biopsies;  Surgeon: Rupa Hays MD;  Location:  [facility];  Service: Gastroenterology   • ENDOSCOPY N/A 2018    Procedure: ESOPHAGOGASTRODUODENOSCOPY JWITH BIOPSIES;  Surgeon: Laureano Rehman MD;  Location:  [facility] OR;  Service: Gastroenterology   • GLAUCOMA SURGERY     • HYSTERECTOMY     • SHOULDER SURGERY Right     RUPTURED BICEP TENDON         FAMILY HISTORY  Family History   Problem Relation Age of Onset   • Breast cancer Mother    • Breast cancer Maternal Aunt    • Colon polyps Son    • Colon cancer Neg Hx          SOCIAL HISTORY  Social History     Occupational History   • Not on file   Tobacco Use   • Smoking status: Former Smoker     Years: 44.00     Types: Cigarettes     Last attempt to quit: 2010     Years since quittin.3   • Smokeless tobacco: Never Used   Substance and Sexual Activity   • Alcohol use: Yes     Comment: Rare   • Drug use: No   • Sexual activity: Defer       Debilities/Disabilities Identified: None    Emotional Behavior: Appropriate    CURRENT MEDICATIONS    Current Outpatient Medications:   •  amitriptyline (ELAVIL) 25 MG tablet, Take 12.5 mg by mouth Every Night., Disp: , Rfl:   •  aspirin 81 MG chewable tablet, Chew 81 mg Daily., Disp: , Rfl:   •  calcium carb-cholecalciferol (CALCIUM 600+D) 600-800 MG-UNIT tablet, Take 1 tablet by mouth Every Night., Disp: ,  "Rfl:   •  docusate sodium (COLACE) 100 MG capsule, Take 100 mg by mouth Every Night., Disp: , Rfl:   •  estrogens, conjugated, (PREMARIN) 0.45 MG tablet, Take 0.45 mg by mouth Every Night. Take daily for 21 days then do not take for 7 days. , Disp: , Rfl:   •  ferrous sulfate 140 (45 Fe) MG tablet controlled-release tablet, Take 140 mg by mouth Daily With Breakfast., Disp: , Rfl:   •  fluticasone (FLONASE) 50 MCG/ACT nasal spray, 2 sprays into the nostril(s) as directed by provider As Needed for Rhinitis., Disp: , Rfl:   •  ibuprofen (ADVIL,MOTRIN) 800 MG tablet, Take 800 mg by mouth Every 8 (Eight) Hours As Needed for Mild Pain . For arthritis, Disp: , Rfl:   •  losartan (COZAAR) 25 MG tablet, Take 25 mg by mouth Daily., Disp: , Rfl:   •  pantoprazole (PROTONIX) 40 MG EC tablet, Take 1 tablet by mouth Daily., Disp: 90 tablet, Rfl: 3  •  sucralfate (CARAFATE) 1 g tablet, Take 1 tablet by mouth 4 (Four) Times a Day Before Meals & at Bedtime As Needed (Heartburn)., Disp: 120 tablet, Rfl: 10  •  traZODone (DESYREL) 100 MG tablet, Take 100 mg by mouth Every Night., Disp: , Rfl:     ALLERGIES  Mobic [meloxicam]    VITALS  Vitals:    04/23/19 0848   BP: 132/70   Resp: 18   Weight: 57.2 kg (126 lb)   Height: 162.6 cm (64\")       LAST RESULTS   Hospital Outpatient Visit on 03/08/2019   Component Date Value Ref Range Status   • BSA 03/08/2019 1.6  m^2 Final   • IVSd 03/08/2019 0.92  cm Final   • LVIDd 03/08/2019 4.3  cm Final   • LVIDs 03/08/2019 2.5  cm Final   • LVPWd 03/08/2019 0.83  cm Final   • IVS/LVPW 03/08/2019 1.1   Final   • FS 03/08/2019 42.1  % Final   • EDV(Teich) 03/08/2019 84.0  ml Final   • ESV(Teich) 03/08/2019 22.3  ml Final   • EF(Teich) 03/08/2019 73.4  % Final   • EDV(cubed) 03/08/2019 80.6  ml Final   • ESV(cubed) 03/08/2019 15.6  ml Final   • EF(cubed) 03/08/2019 80.6  % Final   • LV mass(C)d 03/08/2019 119.4  grams Final   • LV mass(C)dI 03/08/2019 74.0  grams/m^2 Final   • SV(Teich) 03/08/2019 61.7  " ml Final   • SI(Teich) 03/08/2019 38.2  ml/m^2 Final   • SV(cubed) 03/08/2019 65.0  ml Final   • SI(cubed) 03/08/2019 40.3  ml/m^2 Final   • Ao root diam 03/08/2019 2.8  cm Final   • Ao root area 03/08/2019 6.2  cm^2 Final   • LA dimension 03/08/2019 2.7  cm Final   • LA/Ao 03/08/2019 0.96   Final   • LVOT diam 03/08/2019 2.1  cm Final   • LVOT area 03/08/2019 3.5  cm^2 Final   • LVOT area(traced) 03/08/2019 3.5  cm^2 Final   • RVOT diam 03/08/2019 1.8  cm Final   • RVOT area 03/08/2019 2.5  cm^2 Final   • LVLd ap4 03/08/2019 6.5  cm Final   • EDV(MOD-sp4) 03/08/2019 53.0  ml Final   • LVLs ap4 03/08/2019 4.5  cm Final   • ESV(MOD-sp4) 03/08/2019 25.0  ml Final   • EF(MOD-sp4) 03/08/2019 52.8  % Final   • LVLd ap2 03/08/2019 6.5  cm Final   • EDV(MOD-sp2) 03/08/2019 86.6  ml Final   • LVLs ap2 03/08/2019 5.5  cm Final   • ESV(MOD-sp2) 03/08/2019 36.4  ml Final   • EF(MOD-sp2) 03/08/2019 58.0  % Final   • SV(MOD-sp4) 03/08/2019 28.0  ml Final   • SI(MOD-sp4) 03/08/2019 17.4  ml/m^2 Final   • SV(MOD-sp2) 03/08/2019 50.2  ml Final   • SI(MOD-sp2) 03/08/2019 31.1  ml/m^2 Final   • Ao root area (BSA corrected) 03/08/2019 1.7   Final   • LV Oliver Vol (BSA corrected) 03/08/2019 32.9  ml/m^2 Final   • LV Sys Vol (BSA corrected) 03/08/2019 15.5  ml/m^2 Final   • TR max evelyn 03/08/2019 220.0  cm/sec Final   • BH CV ECHO LIMA - BZI_BMI 03/08/2019 21.8  kilograms/m^2 Final   • BH CV ECHO LIMA - BSA(HAYCOCK) 03/08/2019 1.6  m^2 Final   • BH CV ECHO LIMA - BZI_METRIC_WEIGHT 03/08/2019 57.6  kg Final   • BH CV ECHO LIMA - BZI_METRIC_HEIGHT 03/08/2019 162.6  cm Final   • Target HR (85%) 03/08/2019 132  bpm Final   • Max. Pred. HR (100%) 03/08/2019 155  bpm Final   • BH CV VAS BP RIGHT ARM 03/08/2019 116/76  mmHg Final   • EF(MOD-bp) 03/08/2019 52.0  % Final     No results found.    PHYSICAL EXAM  Physical Exam alert thin white female in no active distress.  She has a 1 cm noninfected sebaceous cyst on her left facial cheek that is  subjectively tender.  There is no induration.    ASSESSMENT  Sebaceous cyst      PLAN  The risk benefits and options were discussed with her in detail.  We will excise this in the office next week under local anesthesia

## 2019-04-30 ENCOUNTER — PROCEDURE VISIT (OUTPATIENT)
Dept: SURGERY | Facility: CLINIC | Age: 64
End: 2019-04-30

## 2019-04-30 VITALS
RESPIRATION RATE: 18 BRPM | WEIGHT: 126 LBS | HEIGHT: 64 IN | DIASTOLIC BLOOD PRESSURE: 69 MMHG | BODY MASS INDEX: 21.51 KG/M2 | SYSTOLIC BLOOD PRESSURE: 128 MMHG

## 2019-04-30 DIAGNOSIS — L72.3 SEBACEOUS CYST: Primary | ICD-10-CM

## 2019-04-30 PROCEDURE — 11441 EXC FACE-MM B9+MARG 0.6-1 CM: CPT | Performed by: SURGERY

## 2019-04-30 NOTE — PROGRESS NOTES
Left face cyst - 9 mm  Prepped draped locally infiltrated with 1% lidocaine with epinephrine.  A total of 2 cc was used.  The cyst was elliptically excised along Maxine's lines.  The cyst was 9 mm.  Specimen was sent.  Skin was closed in interrupted simple fashion with use of 6-0 nylon.  She tolerated the procedure well.  Wound was clean and dry and sterilely dressed.  Wound care was discussed and I will see her back in 1 week.

## 2019-05-03 LAB
DX ICD CODE: NORMAL
PATH REPORT.FINAL DX SPEC: NORMAL
PATH REPORT.GROSS SPEC: NORMAL
PATH REPORT.SITE OF ORIGIN SPEC: NORMAL
PATHOLOGIST NAME: NORMAL
PAYMENT PROCEDURE: NORMAL

## 2019-05-07 ENCOUNTER — OFFICE VISIT (OUTPATIENT)
Dept: SURGERY | Facility: CLINIC | Age: 64
End: 2019-05-07

## 2019-05-07 DIAGNOSIS — Z09 SURGICAL FOLLOW-UP CARE: Primary | ICD-10-CM

## 2019-05-07 PROCEDURE — 99024 POSTOP FOLLOW-UP VISIT: CPT | Performed by: SURGERY

## 2019-05-07 NOTE — PROGRESS NOTES
1 wk PO cyst removal - no problems at this time  She is without complaints.  Her incision is healing well.  Pathology was discussed.  Sutures were removed wound care was discussed I will see her back as needed

## 2019-08-15 ENCOUNTER — OFFICE VISIT (OUTPATIENT)
Dept: GASTROENTEROLOGY | Facility: CLINIC | Age: 64
End: 2019-08-15

## 2019-08-15 VITALS — HEIGHT: 64 IN | BODY MASS INDEX: 21.58 KG/M2 | WEIGHT: 126.4 LBS

## 2019-08-15 DIAGNOSIS — K21.00 GASTROESOPHAGEAL REFLUX DISEASE WITH ESOPHAGITIS: Primary | ICD-10-CM

## 2019-08-15 DIAGNOSIS — D12.6 ADENOMATOUS POLYP OF COLON, UNSPECIFIED PART OF COLON: ICD-10-CM

## 2019-08-15 PROCEDURE — 99212 OFFICE O/P EST SF 10 MIN: CPT | Performed by: INTERNAL MEDICINE

## 2019-08-15 NOTE — PROGRESS NOTES
"    PATIENT INFORMATION  Lila Pabon       - 1955    CHIEF COMPLAINT  Chief Complaint   Patient presents with   • Follow-up     6 mo follow up on GERD       HISTORY OF PRESENT ILLNESS  Recent Tic bite and she \"dug it out' Has had nausea and HA as well as Joint stiffness.  Has concern about Lyme disease.     Has been stressed in dealing with her fathers estate over 2 years and dealings with her siblings. So has had bloating and mild GI issues. ( Referred to Dr Perkins for all appropriate testing)    Presently on Q Day PPI and Carafate 2-3 ties a day and she is doing well wrt HB.            REVIEW OF SYSTEMS  Review of Systems   Constitutional: Positive for fatigue.   Gastrointestinal: Positive for nausea.   Musculoskeletal: Positive for arthralgias, back pain, joint swelling, myalgias, neck pain and neck stiffness.   Neurological: Positive for headaches.   All other systems reviewed and are negative.        ACTIVE PROBLEMS  Patient Active Problem List    Diagnosis   • Gastroesophageal reflux disease with esophagitis [K21.0]   • Adenomatous polyp of colon [D12.6]   • Stress-induced cardiomyopathy [I51.81]   • Type 2 myocardial infarction (CMS/HCC) [I21.A1]   • Esophagitis [K20.9]         PAST MEDICAL HISTORY  Past Medical History:   Diagnosis Date   • Anemia     IRON SUPPLEMENTS   • Arthritis    • Colon polyp    • Esophagitis 2018   • GERD (gastroesophageal reflux disease)    • Hypertension    • Stress-induced cardiomyopathy 1/15/2019   • Type 2 myocardial infarction (CMS/HCC) 1/15/2019         SURGICAL HISTORY  Past Surgical History:   Procedure Laterality Date   • CARDIAC CATHETERIZATION N/A 2018    Procedure: Left Heart Cath and Cors;  Surgeon: Suhas Almonte MD;  Location: [facility] INVASIVE LOCATION;  Service: Cardiovascular   • CARDIAC CATHETERIZATION N/A 2018    Procedure: Left ventriculography;  Surgeon: Suhas Almonte MD;  Location: [facility] INVASIVE LOCATION;  " Service: Cardiovascular   • CARDIAC CATHETERIZATION N/A 12/10/2018    Procedure: Right and Left Heart Cath;  Surgeon: Laquita Jessica MD;  Location: [facility] CATH INVASIVE LOCATION;  Service: Cardiology   • CARDIAC CATHETERIZATION N/A 12/10/2018    Procedure: Coronary angiography;  Surgeon: Laquita Jessica MD;  [facility] C[facility];  Service: Cardiology   • CARDIAC CATHETERIZATION N/A 12/10/2018    Procedure: Left ventriculography;  Surgeon: Laquita Jessica MD;  [facility];  Service: Cardiology   • CATARACT EXTRACTION Bilateral    • CATARACT EXTRACTION     • COLONOSCOPY     • ENDOSCOPY N/A 2018    Procedure: ESOPHAGOGASTRODUODENOSCOPY with biopsies;  Surgeon: Rupa Hays MD;  [facility];  Service: Gastroenterology   • ENDOSCOPY N/A 2018    Procedure: ESOPHAGOGASTRODUODENOSCOPY with biopsies;  Surgeon: Rupa Hays MD;  [facility];  Service: Gastroenterology   • ENDOSCOPY N/A 2018    Procedure: ESOPHAGOGASTRODUODENOSCOPY JWITH BIOPSIES;  Surgeon: Laureano Rehman MD;  Location: [facility];  Service: Gastroenterology   • GLAUCOMA SURGERY     • HYSTERECTOMY     • SHOULDER SURGERY Right     RUPTURED BICEP TENDON         FAMILY HISTORY  Family History   Problem Relation Age of Onset   • Breast cancer Mother    • Breast cancer Maternal Aunt    • Colon polyps Son    • Colon cancer Neg Hx          SOCIAL HISTORY  Social History     Occupational History   • Not on file   Tobacco Use   • Smoking status: Former Smoker     Years: 44.00     Types: Cigarettes     Last attempt to quit: 2010     Years since quittin.6   • Smokeless tobacco: Never Used   Substance and Sexual Activity   • Alcohol use: Yes     Comment: Rare   • Drug use: No   • Sexual activity: Defer       Debilities/Disabilities Identified: None    Emotional Behavior: Appropriate    CURRENT MEDICATIONS    Current Outpatient Medications:   •   "amitriptyline (ELAVIL) 25 MG tablet, Take 12.5 mg by mouth Every Night., Disp: , Rfl:   •  aspirin 81 MG chewable tablet, Chew 81 mg Daily., Disp: , Rfl:   •  calcium carb-cholecalciferol (CALCIUM 600+D) 600-800 MG-UNIT tablet, Take 1 tablet by mouth Every Night., Disp: , Rfl:   •  docusate sodium (COLACE) 100 MG capsule, Take 100 mg by mouth Every Night., Disp: , Rfl:   •  estrogens, conjugated, (PREMARIN) 0.45 MG tablet, Take 0.45 mg by mouth Every Night. Take daily for 21 days then do not take for 7 days. , Disp: , Rfl:   •  ferrous sulfate 140 (45 Fe) MG tablet controlled-release tablet, Take 140 mg by mouth Daily With Breakfast., Disp: , Rfl:   •  fluticasone (FLONASE) 50 MCG/ACT nasal spray, 2 sprays into the nostril(s) as directed by provider As Needed for Rhinitis., Disp: , Rfl:   •  ibuprofen (ADVIL,MOTRIN) 800 MG tablet, Take 800 mg by mouth Every 8 (Eight) Hours As Needed for Mild Pain . For arthritis, Disp: , Rfl:   •  losartan (COZAAR) 25 MG tablet, Take 25 mg by mouth Daily., Disp: , Rfl:   •  pantoprazole (PROTONIX) 40 MG EC tablet, Take 1 tablet by mouth Daily., Disp: 90 tablet, Rfl: 3  •  sucralfate (CARAFATE) 1 g tablet, Take 1 tablet by mouth 4 (Four) Times a Day Before Meals & at Bedtime As Needed (Heartburn)., Disp: 120 tablet, Rfl: 10  •  traZODone (DESYREL) 100 MG tablet, Take 100 mg by mouth Every Night., Disp: , Rfl:     ALLERGIES  Mobic [meloxicam]    VITALS  Vitals:    08/15/19 0947   Weight: 57.3 kg (126 lb 6.4 oz)   Height: 162.6 cm (64.02\")       LAST RESULTS     No results found.    PHYSICAL EXAM  Physical Exam   Constitutional: She is oriented to person, place, and time. She appears well-developed and well-nourished.   HENT:   Head: Normocephalic and atraumatic.   Eyes: Conjunctivae and EOM are normal. Pupils are equal, round, and reactive to light. No scleral icterus.   Neck: Normal range of motion. Neck supple. No thyromegaly present.   Cardiovascular: Normal rate, regular rhythm, " normal heart sounds and intact distal pulses. Exam reveals no gallop.   No murmur heard.  Pulmonary/Chest: Effort normal and breath sounds normal. She has no wheezes. She has no rales.   Abdominal: Soft. Bowel sounds are normal. She exhibits no shifting dullness, no distension, no fluid wave, no abdominal bruit, no ascites and no mass. There is no hepatosplenomegaly. There is no tenderness. There is no guarding and negative Mcmahon's sign. Hernia confirmed negative in the ventral area.   Musculoskeletal: Normal range of motion. She exhibits no edema.   Lymphadenopathy:     She has no cervical adenopathy.   Neurological: She is alert and oriented to person, place, and time.   Skin: Skin is warm and dry. No rash noted. She is not diaphoretic. No erythema.   Psychiatric: She has a normal mood and affect. Her behavior is normal.       ASSESSMENT  Diagnoses and all orders for this visit:    Gastroesophageal reflux disease with esophagitis    Adenomatous polyp of colon, unspecified part of colon          PLAN    3/2022 recall for colon    Return in about 6 months (around 2/15/2020).

## 2019-11-18 ENCOUNTER — TRANSCRIBE ORDERS (OUTPATIENT)
Dept: ADMINISTRATIVE | Facility: HOSPITAL | Age: 64
End: 2019-11-18

## 2019-11-18 DIAGNOSIS — M54.16 LUMBAR RADICULOPATHY: Primary | ICD-10-CM

## 2019-11-18 DIAGNOSIS — Z12.31 VISIT FOR SCREENING MAMMOGRAM: Primary | ICD-10-CM

## 2019-11-27 ENCOUNTER — HOSPITAL ENCOUNTER (OUTPATIENT)
Dept: MRI IMAGING | Facility: HOSPITAL | Age: 64
Discharge: HOME | End: 2019-11-27
Admitting: FAMILY MEDICINE

## 2019-11-27 DIAGNOSIS — M54.16 LUMBAR RADICULOPATHY: ICD-10-CM

## 2019-11-27 PROCEDURE — 72148 MRI LUMBAR SPINE W/O DYE: CPT

## 2019-12-05 ENCOUNTER — HOSPITAL ENCOUNTER (OUTPATIENT)
Dept: MAMMOGRAPHY | Facility: HOSPITAL | Age: 64
Discharge: HOME | End: 2019-12-05
Admitting: FAMILY MEDICINE

## 2019-12-05 DIAGNOSIS — Z12.31 VISIT FOR SCREENING MAMMOGRAM: ICD-10-CM

## 2019-12-05 PROCEDURE — 77063 BREAST TOMOSYNTHESIS BI: CPT

## 2019-12-05 PROCEDURE — 77067 SCR MAMMO BI INCL CAD: CPT

## 2020-01-15 ENCOUNTER — OFFICE VISIT (OUTPATIENT)
Dept: ORTHOPEDIC SURGERY | Facility: CLINIC | Age: 65
End: 2020-01-15

## 2020-01-15 VITALS — BODY MASS INDEX: 20.83 KG/M2 | HEIGHT: 65 IN | TEMPERATURE: 97.6 F | WEIGHT: 125 LBS

## 2020-01-15 DIAGNOSIS — M54.50 LUMBAR SPINE PAIN: Primary | ICD-10-CM

## 2020-01-15 DIAGNOSIS — M43.16 SPONDYLOLISTHESIS OF LUMBAR REGION: ICD-10-CM

## 2020-01-15 DIAGNOSIS — M48.062 SPINAL STENOSIS OF LUMBAR REGION WITH NEUROGENIC CLAUDICATION: ICD-10-CM

## 2020-01-15 PROCEDURE — 99204 OFFICE O/P NEW MOD 45 MIN: CPT | Performed by: ORTHOPAEDIC SURGERY

## 2020-01-15 NOTE — PROGRESS NOTES
New patient or new problem visit    Chief Complaint   Patient presents with   • Lumbar Spine - Establish Care, Pain       HPI: She complains of pain from the neck to the buttock but narrows this down to the low back pain radiating to the left buttock.  Worse with activity moderate to severe in extent, intermittent in nature, stabbing aching and burning in quality.  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  Pain sometimes radiates into the lateral leg.  Yoga seems to help.    PFSH: See chart- reviewed    Review of Systems   Constitutional: Negative for chills, fever and unexpected weight change.   HENT: Negative for trouble swallowing and voice change.    Eyes: Negative for visual disturbance.   Respiratory: Negative for cough and shortness of breath.    Cardiovascular: Negative for chest pain and leg swelling.   Gastrointestinal: Negative for abdominal pain, nausea and vomiting.   Endocrine: Negative for cold intolerance and heat intolerance.   Genitourinary: Negative for difficulty urinating, frequency and urgency.   Musculoskeletal: Positive for arthralgias, back pain, myalgias and neck pain.   Skin: Negative for rash and wound.   Allergic/Immunologic: Negative for immunocompromised state.   Neurological: Positive for numbness. Negative for weakness.   Hematological: Does not bruise/bleed easily.   Psychiatric/Behavioral: Negative for dysphoric mood. The patient is not nervous/anxious.        PE: Constitutional: Vital signs above-noted.  Awake, alert and oriented    Psychiatric: Affect and insight do not appear grossly disturbed.    Pulmonary: Breathing is unlabored, color is good.    Skin: Warm, dry and normal turgor    Cardiac: Pedal pulses intact.  No edema.    Eyesight and hearing appear adequate for examination purposes      Musculoskeletal:  There is moderate tenderness to percussion and palpation of the spine. Motion appears undisturbed.  Posture is unremarkable to coronal  and sagittal inspection.    The skin about the area is intact.  There is no palpable or visible deformity.  There is no local spasm.       Neurologic:   Reflexes are 2+ and symmetrical in the patellae and achilles.   Motor function is undisturbed in quadriceps, EHL, and gastrocnemius   sensation appears symmetrically intact to light touch.  In the bilateral lower extremities there is no evidence of atrophy.   Clonus is absent..  Gait appears undisturbed.  SLR test negative      MEDICAL DECISION MAKING    XRAY: MRI scan of the lumbar spine shows moderate stenosis at L3-4 where there is a left-sided synovial cyst and severe stenosis at L4-5 where there is spondylolisthesis grade 1.    Other: n/a    Impression: Lumbar spondylolisthesis lumbar spinal stenosis L4-5, L3-4    Plan: For now epidural injections if he gets worse surgery certainly an option.

## 2020-01-27 ENCOUNTER — ANESTHESIA EVENT (OUTPATIENT)
Dept: PAIN MEDICINE | Facility: HOSPITAL | Age: 65
End: 2020-01-27

## 2020-01-27 ENCOUNTER — HOSPITAL ENCOUNTER (OUTPATIENT)
Dept: PAIN MEDICINE | Facility: HOSPITAL | Age: 65
Discharge: HOME | End: 2020-01-27
Admitting: ANESTHESIOLOGY

## 2020-01-27 ENCOUNTER — ANESTHESIA (OUTPATIENT)
Dept: PAIN MEDICINE | Facility: HOSPITAL | Age: 65
End: 2020-01-27

## 2020-01-27 ENCOUNTER — HOSPITAL ENCOUNTER (OUTPATIENT)
Dept: GENERAL RADIOLOGY | Facility: HOSPITAL | Age: 65
Discharge: HOME | End: 2020-01-27

## 2020-01-27 VITALS
DIASTOLIC BLOOD PRESSURE: 75 MMHG | HEART RATE: 51 BPM | HEIGHT: 64 IN | SYSTOLIC BLOOD PRESSURE: 112 MMHG | OXYGEN SATURATION: 96 % | BODY MASS INDEX: 21.34 KG/M2 | TEMPERATURE: 97.8 F | WEIGHT: 125 LBS | RESPIRATION RATE: 16 BRPM

## 2020-01-27 DIAGNOSIS — M54.16 LUMBAR NEURITIS: Primary | ICD-10-CM

## 2020-01-27 DIAGNOSIS — R52 PAIN: ICD-10-CM

## 2020-01-27 PROCEDURE — 25010000002 METHYLPREDNISOLONE PER 80 MG: Performed by: ANESTHESIOLOGY

## 2020-01-27 PROCEDURE — C1755 CATHETER, INTRASPINAL: HCPCS

## 2020-01-27 PROCEDURE — 77003 FLUOROGUIDE FOR SPINE INJECT: CPT

## 2020-01-27 PROCEDURE — 25010000002 MIDAZOLAM PER 1 MG: Performed by: ANESTHESIOLOGY

## 2020-01-27 PROCEDURE — 0 IOPAMIDOL 41 % SOLUTION: Performed by: ANESTHESIOLOGY

## 2020-01-27 RX ORDER — SODIUM CHLORIDE 0.9 % (FLUSH) 0.9 %
3 SYRINGE (ML) INJECTION EVERY 12 HOURS SCHEDULED
Status: DISCONTINUED | OUTPATIENT
Start: 2020-01-27 | End: 2020-01-28 | Stop reason: HOSPADM

## 2020-01-27 RX ORDER — FENTANYL CITRATE 50 UG/ML
50 INJECTION, SOLUTION INTRAMUSCULAR; INTRAVENOUS AS NEEDED
Status: DISCONTINUED | OUTPATIENT
Start: 2020-01-27 | End: 2020-01-28 | Stop reason: HOSPADM

## 2020-01-27 RX ORDER — MIDAZOLAM HYDROCHLORIDE 1 MG/ML
1 INJECTION INTRAMUSCULAR; INTRAVENOUS AS NEEDED
Status: DISCONTINUED | OUTPATIENT
Start: 2020-01-27 | End: 2020-01-28 | Stop reason: HOSPADM

## 2020-01-27 RX ORDER — SODIUM CHLORIDE 0.9 % (FLUSH) 0.9 %
3-10 SYRINGE (ML) INJECTION AS NEEDED
Status: DISCONTINUED | OUTPATIENT
Start: 2020-01-27 | End: 2020-01-28 | Stop reason: HOSPADM

## 2020-01-27 RX ORDER — LIDOCAINE HYDROCHLORIDE 10 MG/ML
1 INJECTION, SOLUTION INFILTRATION; PERINEURAL ONCE AS NEEDED
Status: DISCONTINUED | OUTPATIENT
Start: 2020-01-27 | End: 2020-01-28 | Stop reason: HOSPADM

## 2020-01-27 RX ORDER — METHYLPREDNISOLONE ACETATE 80 MG/ML
80 INJECTION, SUSPENSION INTRA-ARTICULAR; INTRALESIONAL; INTRAMUSCULAR; SOFT TISSUE ONCE
Status: COMPLETED | OUTPATIENT
Start: 2020-01-27 | End: 2020-01-27

## 2020-01-27 RX ORDER — SODIUM CHLORIDE 0.9 % (FLUSH) 0.9 %
1-10 SYRINGE (ML) INJECTION AS NEEDED
Status: DISCONTINUED | OUTPATIENT
Start: 2020-01-27 | End: 2020-01-28 | Stop reason: HOSPADM

## 2020-01-27 RX ADMIN — MIDAZOLAM 1 MG: 1 INJECTION INTRAMUSCULAR; INTRAVENOUS at 08:50

## 2020-01-27 RX ADMIN — IOPAMIDOL 10 ML: 408 INJECTION, SOLUTION INTRATHECAL at 08:53

## 2020-01-27 RX ADMIN — METHYLPREDNISOLONE ACETATE 80 MG: 80 INJECTION, SUSPENSION INTRA-ARTICULAR; INTRALESIONAL; INTRAMUSCULAR; SOFT TISSUE at 08:53

## 2020-01-27 NOTE — ANESTHESIA PROCEDURE NOTES
PAIN Epidural block    Pre-sedation assessment completed: 1/27/2020 8:46 AM    Patient reassessed immediately prior to procedure    Patient location during procedure: pain clinic  Start Time: 1/27/2020 8:47 AM  Stop Time: 1/27/2020 8:55 AM  Indication:at surgeon's request and procedure for pain  Performed By  Anesthesiologist: Hawk Amador MD  Preanesthetic Checklist  Completed: patient identified, site marked, surgical consent, pre-op evaluation, timeout performed, risks and benefits discussed and monitors and equipment checked  Additional Notes  Post-Op Diagnosis Codes:     * Lumbar neuritis (M54.16)     * Lumbago (M54.5)     * Degeneration of lumbar intervertebral disc (M51.36)    Prep:  Pt Position:prone  Sterile Tech:sterile barrier, mask and gloves  Prep:chlorhexidine gluconate and isopropyl alcohol  Monitoring:blood pressure monitoring, continuous pulse oximetry and EKG  Procedure:  Approach:left paramedian  Guidance: fluoroscopy  Location:lumbar  Level:4-5  Needle Gauge:20 G  Aspiration:negative  Test Dose:negative  Medications:  Depomedrol:80 mg  Preservative Free Saline:2mL  Isovue:2mL  Comments:Lumbar epidural steroid injection was performed on the L4-5 level using a left paramedian approach.  There was an excellent loss resistance to injection.  2 mL of Isovue were slowly injected demonstrating cranial and caudal spread.  There is moderate exacerbation of her symptoms with injection.  I then injected 80 mg of Depo-Medrol again with moderate exacerbation.  The pain did shari upon cessation of injection.  The needle was withdrawn.  She tolerated the procedure well.  Post Assessment:  Pt Tolerance:patient tolerated the procedure well with no apparent complications  Complications:no

## 2020-02-13 ENCOUNTER — OFFICE VISIT (OUTPATIENT)
Dept: GASTROENTEROLOGY | Facility: CLINIC | Age: 65
End: 2020-02-13

## 2020-02-13 VITALS — HEIGHT: 64 IN | WEIGHT: 120 LBS | BODY MASS INDEX: 20.49 KG/M2

## 2020-02-13 DIAGNOSIS — M19.041 PRIMARY OSTEOARTHRITIS OF BOTH HANDS: Primary | ICD-10-CM

## 2020-02-13 DIAGNOSIS — M19.042 PRIMARY OSTEOARTHRITIS OF BOTH HANDS: Primary | ICD-10-CM

## 2020-02-13 DIAGNOSIS — K21.00 GASTROESOPHAGEAL REFLUX DISEASE WITH ESOPHAGITIS: ICD-10-CM

## 2020-02-13 DIAGNOSIS — K29.00 ACUTE SUPERFICIAL GASTRITIS WITHOUT HEMORRHAGE: ICD-10-CM

## 2020-02-13 PROCEDURE — 99213 OFFICE O/P EST LOW 20 MIN: CPT | Performed by: INTERNAL MEDICINE

## 2020-02-13 RX ORDER — SUCRALFATE 1 G/1
1 TABLET ORAL
Qty: 360 TABLET | Refills: 3 | Status: SHIPPED | OUTPATIENT
Start: 2020-02-13 | End: 2020-02-14 | Stop reason: SDUPTHER

## 2020-02-13 RX ORDER — PANTOPRAZOLE SODIUM 40 MG/1
40 TABLET, DELAYED RELEASE ORAL
Qty: 180 TABLET | Refills: 3 | Status: SHIPPED | OUTPATIENT
Start: 2020-02-13 | End: 2020-02-14 | Stop reason: SDUPTHER

## 2020-02-13 RX ORDER — TRAMADOL HYDROCHLORIDE 50 MG/1
50 TABLET ORAL EVERY 6 HOURS PRN
Qty: 60 TABLET | Refills: 5 | Status: SHIPPED | OUTPATIENT
Start: 2020-02-13 | End: 2020-08-13 | Stop reason: SDUPTHER

## 2020-02-13 NOTE — PROGRESS NOTES
PATIENT INFORMATION  Lila Pabon       - 1955    CHIEF COMPLAINT  Chief Complaint   Patient presents with   • Follow-up     6 mo follow up on GERD       HISTORY OF PRESENT ILLNESS  Last seen in 2019 and folowed for history of polyps and her GERD on BID PPI and carafate for gastritis and is requesting Ibuprofen for hands and back    Has changed her eating habits and is doing well only rare breakthrough but responds to carafate          REVIEW OF SYSTEMS  Review of Systems   All other systems reviewed and are negative.        ACTIVE PROBLEMS  Patient Active Problem List    Diagnosis   • Gastroesophageal reflux disease with esophagitis [K21.0]   • Adenomatous polyp of colon [D12.6]   • Stress-induced cardiomyopathy [I51.81]   • Type 2 myocardial infarction (CMS/HCC) [I21.A1]   • Esophagitis [K20.9]         PAST MEDICAL HISTORY  Past Medical History:   Diagnosis Date   • Anemia     IRON SUPPLEMENTS   • Arthritis    • Colon polyp    • Esophagitis 2018   • GERD (gastroesophageal reflux disease)    • Hypertension    • Stress-induced cardiomyopathy 1/15/2019   • Type 2 myocardial infarction (CMS/HCC) 1/15/2019         SURGICAL HISTORY  Past Surgical History:   Procedure Laterality Date   • CARDIAC CATHETERIZATION N/A 2018    Procedure: Left Heart Cath and Cors;  Surgeon: Suhas Almonte MD;  [facility];  Service: Cardiovascular   • CARDIAC CATHETERIZATION N/A 2018    Procedure: Left ventriculography;  Surgeon: Suhas Almonte MD;  Location:  [facility] CATH INVASIVE LOCATION;  Service: Cardiovascular   • CARDIAC CATHETERIZATION N/A 12/10/2018    Procedure: Right and Left Heart Cath;  Surgeon: Laquita Jessica MD;  Location:  [facility] CATH INVASIVE LOCATION;  Service: Cardiology   • CARDIAC CATHETERIZATION N/A 12/10/2018    Procedure: Coronary angiography;  Surgeon: Laquita Jessica MD;  Location:  [facility] CATH INVASIVE LOCATION;  Service: Cardiology   • CARDIAC  CATHETERIZATION N/A 12/10/2018    Procedure: Left ventriculography;  Surgeon: Laquita Jessica MD;  Location:  [facility] CATH INVASIVE LOCATION;  Service: Cardiology   • CATARACT EXTRACTION Bilateral    • CATARACT EXTRACTION     • COLONOSCOPY     • ENDOSCOPY N/A 2018    Procedure: ESOPHAGOGASTRODUODENOSCOPY with biopsies;  Surgeon: Rupa Hays MD;  Location:  [facility] ENDOSCOPY;  Service: Gastroenterology   • ENDOSCOPY N/A 2018    Procedure: ESOPHAGOGASTRODUODENOSCOPY with biopsies;  Surgeon: Rupa Hays MD;  Location:  [facility];  Service: Gastroenterology   • ENDOSCOPY N/A 2018    Procedure: ESOPHAGOGASTRODUODENOSCOPY JWITH BIOPSIES;  Surgeon: Laureano Rehman MD;  Location: [facility] OR;  Service: Gastroenterology   • GLAUCOMA SURGERY     • HYSTERECTOMY     • SHOULDER SURGERY Right     RUPTURED BICEP TENDON   • SHOULDER SURGERY           FAMILY HISTORY  Family History   Problem Relation Age of Onset   • Breast cancer Mother    • Breast cancer Maternal Aunt    • Colon polyps Son    • Colon cancer Neg Hx          SOCIAL HISTORY  Social History     Occupational History   • Not on file   Tobacco Use   • Smoking status: Former Smoker     Years: 44.00     Types: Cigarettes     Last attempt to quit: 2010     Years since quittin.1   • Smokeless tobacco: Never Used   Substance and Sexual Activity   • Alcohol use: Yes     Comment: Rare   • Drug use: No   • Sexual activity: Defer       Debilities/Disabilities Identified: None    Emotional Behavior: Appropriate    CURRENT MEDICATIONS    Current Outpatient Medications:   •  aspirin 81 MG chewable tablet, Chew 81 mg Daily., Disp: , Rfl:   •  calcium carb-cholecalciferol (CALCIUM 600+D) 600-800 MG-UNIT tablet, Take 1 tablet by mouth Every Night., Disp: , Rfl:   •  docusate sodium (COLACE) 100 MG capsule, Take 100 mg by mouth Every Night., Disp: , Rfl:   •  estrogens, conjugated, (PREMARIN) 0.45 MG tablet, Take 0.45 mg by mouth Every  "Night. Take daily for 21 days then do not take for 7 days. , Disp: , Rfl:   •  ferrous sulfate 140 (45 Fe) MG tablet controlled-release tablet, Take 140 mg by mouth Daily With Breakfast., Disp: , Rfl:   •  fluticasone (FLONASE) 50 MCG/ACT nasal spray, 2 sprays into the nostril(s) as directed by provider As Needed for Rhinitis., Disp: , Rfl:   •  losartan (COZAAR) 25 MG tablet, Take 25 mg by mouth Daily., Disp: , Rfl:   •  pantoprazole (PROTONIX) 40 MG EC tablet, Take 1 tablet by mouth Daily., Disp: 90 tablet, Rfl: 3  •  sucralfate (CARAFATE) 1 g tablet, Take 1 tablet by mouth 4 (Four) Times a Day Before Meals & at Bedtime As Needed (Heartburn)., Disp: 120 tablet, Rfl: 10  •  traMADol (ULTRAM) 50 MG tablet, Take 1 tablet by mouth Every 6 (Six) Hours As Needed for Moderate Pain ., Disp: 60 tablet, Rfl: 5  •  traZODone (DESYREL) 100 MG tablet, Take 100 mg by mouth Every Night., Disp: , Rfl:     ALLERGIES  Mobic [meloxicam]    VITALS  Vitals:    02/13/20 0934   Weight: 54.4 kg (120 lb)   Height: 162.6 cm (64.02\")       LAST RESULTS     [facility] Guided Pain Management Spine    Result Date: 1/27/2020  Narrative: This procedure was auto-finalized with no dictation required.      PHYSICAL EXAM  Physical Exam   Constitutional: She is oriented to person, place, and time. She appears well-developed and well-nourished.   HENT:   Head: Normocephalic and atraumatic.   Eyes: Pupils are equal, round, and reactive to light. Conjunctivae and EOM are normal. No scleral icterus.   Neck: Normal range of motion. Neck supple. No thyromegaly present.   Cardiovascular: Normal rate, regular rhythm, normal heart sounds and intact distal pulses. Exam reveals no gallop.   No murmur heard.  Pulmonary/Chest: Effort normal and breath sounds normal. She has no wheezes. She has no rales.   Abdominal: Soft. Bowel sounds are normal. She exhibits no shifting dullness, no distension, no fluid wave, no abdominal bruit, no ascites and no mass. There is no " hepatosplenomegaly. There is no tenderness. There is no guarding and negative Mcmahon's sign. Hernia confirmed negative in the ventral area.   Musculoskeletal: Normal range of motion. She exhibits no edema.   Lymphadenopathy:     She has no cervical adenopathy.   Neurological: She is alert and oriented to person, place, and time.   Skin: Skin is warm and dry. No rash noted. She is not diaphoretic. No erythema.   Psychiatric: She has a normal mood and affect. Her behavior is normal.       ASSESSMENT  Diagnoses and all orders for this visit:    Primary osteoarthritis of both hands  -     traMADol (ULTRAM) 50 MG tablet; Take 1 tablet by mouth Every 6 (Six) Hours As Needed for Moderate Pain .    Gastroesophageal reflux disease with esophagitis    Acute superficial gastritis without hemorrhage          PLAN  Will send in tramadol to take with daily Tylenol arthritis  Return in about 6 months (around 8/13/2020).

## 2020-02-14 RX ORDER — PANTOPRAZOLE SODIUM 40 MG/1
40 TABLET, DELAYED RELEASE ORAL
Qty: 180 TABLET | Refills: 3 | Status: SHIPPED | OUTPATIENT
Start: 2020-02-14 | End: 2020-08-13 | Stop reason: SDUPTHER

## 2020-02-14 RX ORDER — SUCRALFATE 1 G/1
1 TABLET ORAL
Qty: 360 TABLET | Refills: 3 | Status: SHIPPED | OUTPATIENT
Start: 2020-02-14 | End: 2020-08-13 | Stop reason: SDUPTHER

## 2020-02-24 ENCOUNTER — APPOINTMENT (OUTPATIENT)
Dept: PAIN MEDICINE | Facility: HOSPITAL | Age: 65
End: 2020-02-24

## 2020-03-24 ENCOUNTER — APPOINTMENT (OUTPATIENT)
Dept: PAIN MEDICINE | Facility: HOSPITAL | Age: 65
End: 2020-03-24

## 2020-05-11 ENCOUNTER — HOSPITAL ENCOUNTER (OUTPATIENT)
Dept: GENERAL RADIOLOGY | Facility: HOSPITAL | Age: 65
Discharge: HOME | End: 2020-05-11

## 2020-05-11 ENCOUNTER — ANESTHESIA (OUTPATIENT)
Dept: PAIN MEDICINE | Facility: HOSPITAL | Age: 65
End: 2020-05-11

## 2020-05-11 ENCOUNTER — ANESTHESIA EVENT (OUTPATIENT)
Dept: PAIN MEDICINE | Facility: HOSPITAL | Age: 65
End: 2020-05-11

## 2020-05-11 ENCOUNTER — HOSPITAL ENCOUNTER (OUTPATIENT)
Dept: PAIN MEDICINE | Facility: HOSPITAL | Age: 65
Discharge: HOME | End: 2020-05-11
Admitting: ANESTHESIOLOGY

## 2020-05-11 VITALS
OXYGEN SATURATION: 100 % | DIASTOLIC BLOOD PRESSURE: 91 MMHG | RESPIRATION RATE: 16 BRPM | TEMPERATURE: 98.1 F | HEART RATE: 58 BPM | SYSTOLIC BLOOD PRESSURE: 149 MMHG

## 2020-05-11 DIAGNOSIS — M54.16 LUMBAR NEURITIS: ICD-10-CM

## 2020-05-11 DIAGNOSIS — R52 PAIN: ICD-10-CM

## 2020-05-11 PROCEDURE — C1755 CATHETER, INTRASPINAL: HCPCS

## 2020-05-11 PROCEDURE — 77003 FLUOROGUIDE FOR SPINE INJECT: CPT

## 2020-05-11 PROCEDURE — 25010000002 METHYLPREDNISOLONE PER 80 MG: Performed by: ANESTHESIOLOGY

## 2020-05-11 PROCEDURE — 0 IOPAMIDOL 41 % SOLUTION: Performed by: ANESTHESIOLOGY

## 2020-05-11 RX ORDER — MIDAZOLAM HYDROCHLORIDE 1 MG/ML
1 INJECTION INTRAMUSCULAR; INTRAVENOUS AS NEEDED
Status: DISCONTINUED | OUTPATIENT
Start: 2020-05-11 | End: 2020-05-12 | Stop reason: HOSPADM

## 2020-05-11 RX ORDER — FENTANYL CITRATE 50 UG/ML
50 INJECTION, SOLUTION INTRAMUSCULAR; INTRAVENOUS AS NEEDED
Status: DISCONTINUED | OUTPATIENT
Start: 2020-05-11 | End: 2020-05-12 | Stop reason: HOSPADM

## 2020-05-11 RX ORDER — METHYLPREDNISOLONE ACETATE 80 MG/ML
80 INJECTION, SUSPENSION INTRA-ARTICULAR; INTRALESIONAL; INTRAMUSCULAR; SOFT TISSUE ONCE
Status: COMPLETED | OUTPATIENT
Start: 2020-05-11 | End: 2020-05-11

## 2020-05-11 RX ORDER — LIDOCAINE HYDROCHLORIDE 10 MG/ML
1 INJECTION, SOLUTION INFILTRATION; PERINEURAL ONCE AS NEEDED
Status: DISCONTINUED | OUTPATIENT
Start: 2020-05-11 | End: 2020-05-12 | Stop reason: HOSPADM

## 2020-05-11 RX ORDER — SODIUM CHLORIDE 0.9 % (FLUSH) 0.9 %
1-10 SYRINGE (ML) INJECTION AS NEEDED
Status: DISCONTINUED | OUTPATIENT
Start: 2020-05-11 | End: 2020-05-12 | Stop reason: HOSPADM

## 2020-05-11 RX ADMIN — IOPAMIDOL 10 ML: 408 INJECTION, SOLUTION INTRATHECAL at 10:23

## 2020-05-11 RX ADMIN — METHYLPREDNISOLONE ACETATE 80 MG: 80 INJECTION, SUSPENSION INTRA-ARTICULAR; INTRALESIONAL; INTRAMUSCULAR; SOFT TISSUE at 10:23

## 2020-05-11 NOTE — ANESTHESIA PROCEDURE NOTES
PAIN Epidural block    Pre-sedation assessment completed: 5/11/2020 10:15 AM    Patient reassessed immediately prior to procedure    Patient location during procedure: pain clinic  Start Time: 5/11/2020 10:18 AM  Stop Time: 5/11/2020 10:25 AM  Indication:procedure for pain  Performed By  Anesthesiologist: Rupert Christnie MD  Preanesthetic Checklist  Completed: patient identified, site marked, surgical consent, pre-op evaluation, timeout performed, IV checked, risks and benefits discussed and monitors and equipment checked  Additional Notes  Diagnosis:  Post-Op Diagnosis Codes:     * Lumbar neuritis (M54.16)     * Lumbar degenerative disc disease (M51.36)     * Lumbago (M54.5)      Prep:  Pt Position:prone  Sterile Tech:cap, gloves and sterile barrier  Prep:chlorhexidine gluconate and isopropyl alcohol  Monitoring:EKG, continuous pulse oximetry and blood pressure monitoring  Procedure:Sedation: no     Approach:left paramedian  Guidance: fluoroscopy and landmark technique  Location:lumbar  Level:4-5  Needle Type:Tuohy  Needle Gauge:20  Aspiration:negative  Test Dose:negative  Medications:  Depomedrol:80 mg  Preservative Free Saline:3mL  Isovue:2mL  Comments:Isovue dye was instilled and dye spread was confirmed to be consistent with epidural placement    Post Assessment:  Dressing:occlusive dressing applied  Pt Tolerance:patient tolerated the procedure well with no apparent complications  Complications:no

## 2020-05-11 NOTE — H&P
[facility]    History and Physical    Patient Name: Lila Pabon  :  1955  MRN:  [id number]  Date of Admission: 2020    Subjective     Patient is a 64 y.o. female presents with chief complaint of acute low back and leg: left pain.  Onset of symptoms was gradual starting a few months ago.  Symptoms are associated/aggravated by activity or exercise. Symptoms improve with injection    The following portions of the patients history were reviewed and updated as appropriate: current medications, allergies, past medical history, past surgical history, past family history, past social history and problem list      Objective     Past Medical History:   Past Medical History:   Diagnosis Date   • Anemia     IRON SUPPLEMENTS   • Arthritis    • Colon polyp    • Esophagitis 2018   • GERD (gastroesophageal reflux disease)    • Hypertension    • Stress-induced cardiomyopathy 1/15/2019   • Type 2 myocardial infarction (CMS/HCC) 1/15/2019     Past Surgical History:   Past Surgical History:   Procedure Laterality Date   • CARDIAC CATHETERIZATION N/A 2018    Procedure: Left Heart Cath and Cors;  Surgeon: Suhas Almonte MD;  Location: [facility] CATH INVASIVE LOCATION;  Service: Cardiovascular   • CARDIAC CATHETERIZATION N/A 2018    Procedure: Left ventriculography;  Surgeon: Suhas Almonte MD;  Location: [facility] CATH INVASIVE LOCATION;  Service: Cardiovascular   • CARDIAC CATHETERIZATION N/A 12/10/2018    Procedure: Right and Left Heart Cath;  Surgeon: Laquita Jessica MD;  Location: [facility] CATH INVASIVE LOCATION;  Service: Cardiology   • CARDIAC CATHETERIZATION N/A 12/10/2018    Procedure: Coronary angiography;  Surgeon: Laquita Jessica MD;  Location: [facility] CATH INVASIVE LOCATION;  Service: Cardiology   • CARDIAC CATHETERIZATION N/A 12/10/2018    Procedure: Left ventriculography;  Surgeon: Laquita Jessica MD;  Location: [facility] CATH INVASIVE LOCATION;  Service: Cardiology   • CATARACT  EXTRACTION Bilateral    • CATARACT EXTRACTION     • COLONOSCOPY     • ENDOSCOPY N/A 2018    Procedure: ESOPHAGOGASTRODUODENOSCOPY with biopsies;  Surgeon: Rupa Hays MD;  Location:  [facility];  Service: Gastroenterology   • ENDOSCOPY N/A 2018    Procedure: ESOPHAGOGASTRODUODENOSCOPY with biopsies;  Surgeon: Rupa Hays MD;  Location:  [facility];  Service: Gastroenterology   • ENDOSCOPY N/A 2018    Procedure: ESOPHAGOGASTRODUODENOSCOPY JWITH BIOPSIES;  Surgeon: Laureano Rehman MD;  Location: [facility] OR;  Service: Gastroenterology   • GLAUCOMA SURGERY     • HYSTERECTOMY     • SHOULDER SURGERY Right     RUPTURED BICEP TENDON   • SHOULDER SURGERY       Family History:   Family History   Problem Relation Age of Onset   • Breast cancer Mother    • Breast cancer Maternal Aunt    • Colon polyps Son    • Colon cancer Neg Hx      Social History:   Social History     Tobacco Use   • Smoking status: Former Smoker     Years: 44.00     Types: Cigarettes     Last attempt to quit: 2010     Years since quittin.3   • Smokeless tobacco: Never Used   Substance Use Topics   • Alcohol use: Yes     Comment: Rare   • Drug use: No       Vital Signs Range for the last 24 hours  Temperature:     Temp Source:     BP:     Pulse:     Respirations:     SPO2:     O2 Amount (l/min):     O2 Devices     Weight:           --------------------------------------------------------------------------------    Current Outpatient Medications   Medication Sig Dispense Refill   • calcium carb-cholecalciferol (CALCIUM 600+D) 600-800 MG-UNIT tablet Take 1 tablet by mouth Every Night.     • docusate sodium (COLACE) 100 MG capsule Take 100 mg by mouth Every Night.     • estrogens, conjugated, (PREMARIN) 0.45 MG tablet Take 0.45 mg by mouth Every Night. Take daily for 21 days then do not take for 7 days.      • ferrous sulfate 140 (45 Fe) MG tablet controlled-release tablet Take 140 mg by mouth Daily With  Breakfast.     • fluticasone (FLONASE) 50 MCG/ACT nasal spray 2 sprays into the nostril(s) as directed by provider As Needed for Rhinitis.     • losartan (COZAAR) 25 MG tablet Take 25 mg by mouth Daily.     • pantoprazole (PROTONIX) 40 MG EC tablet Take 1 tablet by mouth 2 (Two) Times a Day Before Meals. 180 tablet 3   • sucralfate (CARAFATE) 1 g tablet Take 1 tablet by mouth 4 (Four) Times a Day Before Meals & at Bedtime As Needed (Heartburn). 360 tablet 3   • traMADol (ULTRAM) 50 MG tablet Take 1 tablet by mouth Every 6 (Six) Hours As Needed for Moderate Pain . 60 tablet 5   • traZODone (DESYREL) 100 MG tablet Take 100 mg by mouth Every Night.     • aspirin 81 MG chewable tablet Chew 81 mg Daily.       No current facility-administered medications for this encounter.        --------------------------------------------------------------------------------  Assessment/Plan      Anesthesia Evaluation     Patient summary reviewed and Nursing notes reviewed   no history of anesthetic complications:        Pain impairs ability to perform ADLs: Ambulation, Sleeping and Exercise/Activity  Modalities previously tried to control pain with limited effectiveness within the last 4-6 weeks: Ice, Heat and Steroids     Airway   Mallampati: II  TM distance: >3 FB  Neck ROM: full  no difficulty expected  Dental      Pulmonary - negative pulmonary ROS    breath sounds clear to auscultation  (-) shortness of breath, sleep apnea, decreased breath sounds  Cardiovascular   Exercise tolerance: good (4-7 METS)    Rhythm: regular  Rate: normal    (+) hypertension, past MI ,   (-) angina, CHF, orthopnea, PND, HUITRON, PVD      Neuro/Psych- negative ROS  normal reflexes and symmetric  (-) seizures, neuromuscular disease, TIA, CVA, dizziness/light headedness, weakness, numbness, normal gait, left straight leg raise test, right straight leg raise test  GI/Hepatic/Renal/Endo    (+)  GERD,    (-) liver disease, diabetes    Musculoskeletal   normal  range of motion    (+) back pain, radiculopathy Right lower extremity and Left lower extremity  Abdominal     Abdomen: soft.   Substance History - negative use  (-) alcohol use, drug use     OB/GYN negative ob/gyn ROS         Other - negative ROS                  Diagnosis and Plan    Treatment Plan  ASA 3   Patient has had previous injection/procedure with % improvement.   Procedures: Lumbar Epidural Steroid Injection(LESI), With fluoroscopy, Without ultrasound,              Diagnosis     * Lumbar neuritis [M54.16]     * Lumbar degenerative disc disease [M51.36]     * Lumbago [M54.5]     Plan:  Lumbar epidural steroid injection under fluoroscopic guidance    I have encouraged them to continue:  1.  Physical therapy exercises at home as prescribed by physical therapy or from the pain clinic handout (given to the patient).  Continuation of these exercises every day, or multiple times per week, even when the patient has good pain relief, was stressed to the patient as a preventative measure to decrease the frequency and severity of future pain episodes.  2.  Continue pain medicines as already prescribed.  If patient not currently taking any, it is recommended to begin Acetaminophen 1000 mg po q 8 hours.  If other medicines containing Acetaminophen are currently prescribed, maintain daily dose at 3000mg.    3.  If they can tolerate NSAIDS, it is recommended to take Ibuprofen 600 mg po q 6 hours for 7 days during pain exacerbations.   Alternatively, they may substitute an NSAID of their choice (e.g. Aleve)  4.  Heat and ice to the affected area as tolerated for pain control.  It was discussed that heating pads can cause burns.  5.  Low impact exercise such as walking or water exercise was recommended to maintain overall health and aid in weight control.   6.  Follow up as needed for subsequent injections.  7.  Patient was counseled to abstain from tobacco products.

## 2020-06-23 ENCOUNTER — HOSPITAL ENCOUNTER (OUTPATIENT)
Dept: PAIN MEDICINE | Facility: HOSPITAL | Age: 65
Discharge: HOME | End: 2020-06-23
Admitting: ANESTHESIOLOGY

## 2020-06-23 ENCOUNTER — HOSPITAL ENCOUNTER (OUTPATIENT)
Dept: GENERAL RADIOLOGY | Facility: HOSPITAL | Age: 65
Discharge: HOME | End: 2020-06-23

## 2020-06-23 ENCOUNTER — ANESTHESIA EVENT (OUTPATIENT)
Dept: PAIN MEDICINE | Facility: HOSPITAL | Age: 65
End: 2020-06-23

## 2020-06-23 ENCOUNTER — ANESTHESIA (OUTPATIENT)
Dept: PAIN MEDICINE | Facility: HOSPITAL | Age: 65
End: 2020-06-23

## 2020-06-23 VITALS
HEART RATE: 60 BPM | TEMPERATURE: 98.2 F | WEIGHT: 127 LBS | BODY MASS INDEX: 21.79 KG/M2 | SYSTOLIC BLOOD PRESSURE: 111 MMHG | RESPIRATION RATE: 16 BRPM | DIASTOLIC BLOOD PRESSURE: 69 MMHG | OXYGEN SATURATION: 97 %

## 2020-06-23 DIAGNOSIS — M54.16 LUMBAR NEURITIS: Primary | ICD-10-CM

## 2020-06-23 DIAGNOSIS — R52 PAIN: ICD-10-CM

## 2020-06-23 PROCEDURE — C1755 CATHETER, INTRASPINAL: HCPCS

## 2020-06-23 PROCEDURE — 77003 FLUOROGUIDE FOR SPINE INJECT: CPT

## 2020-06-23 PROCEDURE — 25010000002 METHYLPREDNISOLONE PER 80 MG: Performed by: ANESTHESIOLOGY

## 2020-06-23 PROCEDURE — 0 IOPAMIDOL 41 % SOLUTION: Performed by: ANESTHESIOLOGY

## 2020-06-23 RX ORDER — MIDAZOLAM HYDROCHLORIDE 1 MG/ML
1 INJECTION INTRAMUSCULAR; INTRAVENOUS
Status: DISCONTINUED | OUTPATIENT
Start: 2020-06-23 | End: 2020-06-24 | Stop reason: HOSPADM

## 2020-06-23 RX ORDER — METHYLPREDNISOLONE ACETATE 80 MG/ML
80 INJECTION, SUSPENSION INTRA-ARTICULAR; INTRALESIONAL; INTRAMUSCULAR; SOFT TISSUE ONCE
Status: COMPLETED | OUTPATIENT
Start: 2020-06-23 | End: 2020-06-23

## 2020-06-23 RX ORDER — FENTANYL CITRATE 50 UG/ML
50 INJECTION, SOLUTION INTRAMUSCULAR; INTRAVENOUS AS NEEDED
Status: DISCONTINUED | OUTPATIENT
Start: 2020-06-23 | End: 2020-06-24 | Stop reason: HOSPADM

## 2020-06-23 RX ORDER — LIDOCAINE HYDROCHLORIDE 10 MG/ML
1 INJECTION, SOLUTION INFILTRATION; PERINEURAL ONCE
Status: DISCONTINUED | OUTPATIENT
Start: 2020-06-23 | End: 2020-06-24 | Stop reason: HOSPADM

## 2020-06-23 RX ADMIN — METHYLPREDNISOLONE ACETATE 80 MG: 80 INJECTION, SUSPENSION INTRA-ARTICULAR; INTRALESIONAL; INTRAMUSCULAR; SOFT TISSUE at 07:46

## 2020-06-23 RX ADMIN — IOPAMIDOL 10 ML: 408 INJECTION, SOLUTION INTRATHECAL at 07:46

## 2020-06-23 NOTE — ANESTHESIA PROCEDURE NOTES
PAIN Epidural block      Patient reassessed immediately prior to procedure    Patient location during procedure: pain clinic  Indication:procedure for pain  Performed By  Anesthesiologist: José Luis Medina MD  Preanesthetic Checklist  Completed: patient identified, site marked, surgical consent, pre-op evaluation, timeout performed, risks and benefits discussed and monitors and equipment checked  Additional Notes  Depomedrol - 80mg    Needle position confirmed by fluoroscopy and epidurogram using 2cc of [id number].    Diagnosis  Post-Op Diagnosis Codes:     * Lumbar degenerative disc disease (M51.36)     * Lumbar radiculopathy (M54.16)    Prep:  Pt Position:prone  Sterile Tech:cap, gloves, mask and sterile barrier  Prep:chlorhexidine gluconate and isopropyl alcohol  Monitoring:blood pressure monitoring, continuous pulse oximetry and EKG  Procedure:Sedation: no     Approach:left paramedian  Guidance: fluoroscopy  Location:lumbar  Level:4-5  Needle Type:Tuohy  Needle Gauge:20  Aspiration:negative  Medications:  Depomedrol:80 mg  Preservative Free Saline:2mL  Isovue:2mL    Post Assessment:  Post-procedure: bandaide.  Pt Tolerance:patient tolerated the procedure well with no apparent complications  Complications:no

## 2020-06-23 NOTE — H&P
[facility]    History and Physical    Patient Name: Lila Pabon  :  1955  MRN:  [id number]  Date of Admission: 2020    Subjective     Patient is a 64 y.o. female presents with chief complaint of chronic, intermitent, moderate low back and leg: bilateral pain.  Onset of symptoms was gradual starting several months ago.  Symptoms are associated/aggravated by activity, exercise, lifting or twisting. Symptoms improve with rest    The following portions of the patients history were reviewed and updated as appropriate: current medications, allergies, past medical history, past surgical history, past family history, past social history and problem list                Objective     Past Medical History:   Past Medical History:   Diagnosis Date   • Anemia     IRON SUPPLEMENTS   • Arthritis    • Colon polyp    • Esophagitis 2018   • GERD (gastroesophageal reflux disease)    • Hypertension    • Stress-induced cardiomyopathy 1/15/2019   • Type 2 myocardial infarction (CMS/HCC) 1/15/2019     Past Surgical History:   Past Surgical History:   Procedure Laterality Date   • CARDIAC CATHETERIZATION N/A 2018    Procedure: Left Heart Cath and Cors;  Surgeon: Suhas Almonte MD;  Location: [facility] CATH INVASIVE LOCATION;  Service: Cardiovascular   • CARDIAC CATHETERIZATION N/A 2018    Procedure: Left ventriculography;  Surgeon: Suhas Almonte MD;  Location: [facility] CATH INVASIVE LOCATION;  Service: Cardiovascular   • CARDIAC CATHETERIZATION N/A 12/10/2018    Procedure: Right and Left Heart Cath;  Surgeon: Laquita Jessica MD;  [facility];  Service: Cardiology   • CARDIAC CATHETERIZATION N/A 12/10/2018    Procedure: Coronary angiography;  Surgeon: Laquita Jessica MD;  [facility];  Service: Cardiology   • CARDIAC CATHETERIZATION N/A 12/10/2018    Procedure: Left ventriculography;  Surgeon: Laquita Jessica MD;  Location: [facility] CATH  INVASIVE LOCATION;  Service: Cardiology   • CATARACT EXTRACTION Bilateral    • CATARACT EXTRACTION     • COLONOSCOPY     • ENDOSCOPY N/A 2018    Procedure: ESOPHAGOGASTRODUODENOSCOPY with biopsies;  Surgeon: Rupa Hays MD;  Location:  [facility];  Service: Gastroenterology   • ENDOSCOPY N/A 2018    Procedure: ESOPHAGOGASTRODUODENOSCOPY with biopsies;  Surgeon: Rupa Hays MD;  Location:  [facility];  Service: Gastroenterology   • ENDOSCOPY N/A 2018    Procedure: ESOPHAGOGASTRODUODENOSCOPY JWITH BIOPSIES;  Surgeon: Laureano Rehman MD;  Location:  [facility] OR;  Service: Gastroenterology   • GLAUCOMA SURGERY     • HYSTERECTOMY     • SHOULDER SURGERY Right     RUPTURED BICEP TENDON   • SHOULDER SURGERY       Family History:   Family History   Problem Relation Age of Onset   • Breast cancer Mother    • Breast cancer Maternal Aunt    • Colon polyps Son    • Colon cancer Neg Hx      Social History:   Social History     Tobacco Use   • Smoking status: Former Smoker     Years: 44.00     Types: Cigarettes     Last attempt to quit: 2010     Years since quittin.5   • Smokeless tobacco: Never Used   Substance Use Topics   • Alcohol use: Yes     Comment: Rare   • Drug use: No       Vital Signs Range for the last 24 hours  Temperature:     Temp Source:     BP:     Pulse:     Respirations:     SPO2:     O2 Amount (l/min):     O2 Devices     Weight:           --------------------------------------------------------------------------------    Current Outpatient Medications   Medication Sig Dispense Refill   • aspirin 81 MG chewable tablet Chew 81 mg Daily.     • calcium carb-cholecalciferol (CALCIUM 600+D) 600-800 MG-UNIT tablet Take 1 tablet by mouth Every Night.     • docusate sodium (COLACE) 100 MG capsule Take 100 mg by mouth Every Night.     • estrogens, conjugated, (PREMARIN) 0.45 MG tablet Take 0.45 mg by mouth Every Night. Take daily for 21 days then do not take for 7  days.      • ferrous sulfate 140 (45 Fe) MG tablet controlled-release tablet Take 140 mg by mouth Daily With Breakfast.     • fluticasone (FLONASE) 50 MCG/ACT nasal spray 2 sprays into the nostril(s) as directed by provider As Needed for Rhinitis.     • losartan (COZAAR) 25 MG tablet Take 25 mg by mouth Daily.     • pantoprazole (PROTONIX) 40 MG EC tablet Take 1 tablet by mouth 2 (Two) Times a Day Before Meals. 180 tablet 3   • sucralfate (CARAFATE) 1 g tablet Take 1 tablet by mouth 4 (Four) Times a Day Before Meals & at Bedtime As Needed (Heartburn). 360 tablet 3   • traMADol (ULTRAM) 50 MG tablet Take 1 tablet by mouth Every 6 (Six) Hours As Needed for Moderate Pain . 60 tablet 5   • traZODone (DESYREL) 100 MG tablet Take 100 mg by mouth Every Night.       No current facility-administered medications for this encounter.        --------------------------------------------------------------------------------  Assessment/Plan      Anesthesia Evaluation     Patient summary reviewed and Nursing notes reviewed   NPO Solid Status: > 8 hours  NPO Liquid Status: > 2 hours    Pain impairs ability to perform ADLs: Sleeping  Modalities previously tried to control pain with limited effectiveness within the last 4-6 weeks: Rest     Airway   Mallampati: II  TM distance: >3 FB  Neck ROM: full  Dental - normal exam     Pulmonary - normal exam   (+) a smoker Former,   Cardiovascular - normal exam    (+) hypertension, past MI ,       Neuro/Psych- negative ROS and neuro exam normal  GI/Hepatic/Renal/Endo    (+)  GERD,      Musculoskeletal (-) normal exam    (+) back pain, radiculopathy  Abdominal  - normal exam   Substance History - negative use     OB/GYN negative ob/gyn ROS         Other   arthritis,                 Diagnosis and Plan    Treatment Plan  ASA 2   Patient has had previous injection/procedure with 25-50% improvement.   Procedures: Lumbar Epidural Steroid Injection(LESI), With fluoroscopy,       Anesthetic plan and  risks discussed with patient.          Diagnosis     * Lumbar degenerative disc disease [M51.36]     * Lumbar radiculopathy [M54.16]

## 2020-08-13 ENCOUNTER — OFFICE VISIT (OUTPATIENT)
Dept: GASTROENTEROLOGY | Facility: CLINIC | Age: 65
End: 2020-08-13

## 2020-08-13 VITALS
WEIGHT: 125 LBS | SYSTOLIC BLOOD PRESSURE: 132 MMHG | DIASTOLIC BLOOD PRESSURE: 70 MMHG | HEIGHT: 64 IN | BODY MASS INDEX: 21.34 KG/M2 | RESPIRATION RATE: 18 BRPM

## 2020-08-13 DIAGNOSIS — B37.81 CANDIDAL ESOPHAGITIS (HCC): ICD-10-CM

## 2020-08-13 DIAGNOSIS — K21.00 GASTROESOPHAGEAL REFLUX DISEASE WITH ESOPHAGITIS: Primary | ICD-10-CM

## 2020-08-13 DIAGNOSIS — M19.042 PRIMARY OSTEOARTHRITIS OF BOTH HANDS: ICD-10-CM

## 2020-08-13 DIAGNOSIS — Z86.010 PERSONAL HISTORY OF COLONIC POLYPS: ICD-10-CM

## 2020-08-13 DIAGNOSIS — M19.041 PRIMARY OSTEOARTHRITIS OF BOTH HANDS: ICD-10-CM

## 2020-08-13 PROBLEM — Z86.0100 PERSONAL HISTORY OF COLONIC POLYPS: Status: ACTIVE | Noted: 2020-08-13

## 2020-08-13 PROCEDURE — 99213 OFFICE O/P EST LOW 20 MIN: CPT | Performed by: NURSE PRACTITIONER

## 2020-08-13 RX ORDER — PANTOPRAZOLE SODIUM 40 MG/1
40 TABLET, DELAYED RELEASE ORAL
Qty: 180 TABLET | Refills: 3 | Status: SHIPPED | OUTPATIENT
Start: 2020-08-13 | End: 2022-03-31 | Stop reason: SDUPTHER

## 2020-08-13 RX ORDER — TRAMADOL HYDROCHLORIDE 50 MG/1
50 TABLET ORAL EVERY 6 HOURS PRN
Qty: 60 TABLET | Refills: 5 | Status: SHIPPED | OUTPATIENT
Start: 2020-08-13 | End: 2021-04-05

## 2020-08-13 RX ORDER — SUCRALFATE 1 G/1
1 TABLET ORAL
Qty: 360 TABLET | Refills: 3 | Status: SHIPPED | OUTPATIENT
Start: 2020-08-13 | End: 2021-08-25 | Stop reason: DRUGHIGH

## 2020-08-13 NOTE — PATIENT INSTRUCTIONS
3/2022 recall for colon. Continue on Protonix bid and carafate qid and low acid diet.   Recommend daily probiotic. Increase your Colace dose as needed (250mg). Tramadol for arthritis and avoid ibuprofen, aleve etc. Take tylenol three times a day as needed.

## 2020-08-13 NOTE — PROGRESS NOTES
PATIENT INFORMATION  Lila Pabon       - 1955    CHIEF COMPLAINT  Chief Complaint   Patient presents with   • Follow-up       HISTORY OF PRESENT ILLNESS  20 Dr. Rehman visit:  folowed for history of polyps and her GERD on BID PPI and carafate for gastritis and is requesting Ibuprofen for hands and back, tramadol given   Has changed her eating habits and is doing well only rare breakthrough but responds to carafate AND takes about 3 times a day.   EGD 18: EGD was for IN PT Dyspepsia biopsy was Positive for Candida esophagitis,Reflux   3/2022 recall for colon was done in [facility] 3/23/2017.     At this point is taking her meds and only has breakthrough HB when she does not eat right. She cooks ff Heart healthy and salt free and avoids acidic fruit and vegetables.     She is not having any s/s of the candida and is having a regular bm.  She does eat yogurt and takes a daily probiotic.           REVIEW OF SYSTEMS  Review of Systems   Gastrointestinal:        GERD, REFLUX   All other systems reviewed and are negative.        ACTIVE PROBLEMS  Patient Active Problem List    Diagnosis   • Personal history of colonic polyps [Z86.010]   • Gastroesophageal reflux disease with esophagitis [K21.0]   • Adenomatous polyp of colon [D12.6]   • Stress-induced cardiomyopathy [I51.81]   • Type 2 myocardial infarction (CMS/HCC) [I21.A1]   • Esophagitis [K20.9]         PAST MEDICAL HISTORY  Past Medical History:   Diagnosis Date   • Anemia     IRON SUPPLEMENTS   • Arthritis    • Colon polyp    • Esophagitis 2018   • GERD (gastroesophageal reflux disease)    • Hypertension    • Stress-induced cardiomyopathy 1/15/2019   • Type 2 myocardial infarction (CMS/HCC) 1/15/2019         SURGICAL HISTORY  Past Surgical History:   Procedure Laterality Date   • CARDIAC CATHETERIZATION N/A 2018    Procedure: Left Heart Cath and Cors;  Surgeon: Suhas Almonte MD;   Location: [facility] CATH INVASIVE LOCATION;  Service: Cardiovascular   • CARDIAC CATHETERIZATION N/A 2018    Procedure: Left ventriculography;  Surgeon: Suhas Almonte MD;  Location: [facility] CATH INVASIVE LOCATION;  Service: Cardiovascular   • CARDIAC CATHETERIZATION N/A 12/10/2018    Procedure: Right and Left Heart Cath;  Surgeon: Laquita Jessica MD;  Location: [facility] CATH INVASIVE LOCATION;  Service: Cardiology   • CARDIAC CATHETERIZATION N/A 12/10/2018    Procedure: Coronary angiography;  Surgeon: Laquita Jessica MD;  Location: [facility] CATH INVASIVE LOCATION;  Service: Cardiology   • CARDIAC CATHETERIZATION N/A 12/10/2018    Procedure: Left ventriculography;  Surgeon: Laquita Jessica MD;  Location: [facility] CATH INVASIVE LOCATION;  Service: Cardiology   • CATARACT EXTRACTION Bilateral    • CATARACT EXTRACTION     • COLONOSCOPY     • ENDOSCOPY N/A 2018    Procedure: ESOPHAGOGASTRODUODENOSCOPY with biopsies;  Surgeon: Rupa Hays MD;  Location: [facility] ENDOSCOPY;  Service: Gastroenterology   • ENDOSCOPY N/A 2018    Procedure: ESOPHAGOGASTRODUODENOSCOPY with biopsies;  Surgeon: Rupa Hays MD;  Location: [facility] ENDOSCOPY;  Service: Gastroenterology   • ENDOSCOPY N/A 2018    Procedure: ESOPHAGOGASTRODUODENOSCOPY JWITH BIOPSIES;  Surgeon: Laureano Rehman MD;  Location: [facility];  Service: Gastroenterology   • EPIDURAL BLOCK     • GLAUCOMA SURGERY     • HYSTERECTOMY     • SHOULDER SURGERY Right     RUPTURED BICEP TENDON   • SHOULDER SURGERY           FAMILY HISTORY  Family History   Problem Relation Age of Onset   • Breast cancer Mother    • Breast cancer Maternal Aunt    • Colon polyps Son    • Colon cancer Neg Hx          SOCIAL HISTORY  Social History     Occupational History   • Not on file   Tobacco Use   • Smoking status: Former Smoker     Years: 44.00     Types: Cigarettes     Last attempt to quit: 2010     Years since quittin.6   • Smokeless tobacco: Never  "Used   Substance and Sexual Activity   • Alcohol use: Yes     Comment: Rare   • Drug use: No   • Sexual activity: Defer         CURRENT MEDICATIONS    Current Outpatient Medications:   •  aspirin 81 MG chewable tablet, Chew 81 mg Daily., Disp: , Rfl:   •  calcium carb-cholecalciferol (CALCIUM 600+D) 600-800 MG-UNIT tablet, Take 1 tablet by mouth Every Night., Disp: , Rfl:   •  docusate sodium (COLACE) 100 MG capsule, Take 100 mg by mouth Every Night., Disp: , Rfl:   •  estrogens, conjugated, (PREMARIN) 0.45 MG tablet, Take 0.45 mg by mouth Every Night. Take daily for 21 days then do not take for 7 days. , Disp: , Rfl:   •  ferrous sulfate 140 (45 Fe) MG tablet controlled-release tablet, Take 140 mg by mouth Daily With Breakfast., Disp: , Rfl:   •  fluticasone (FLONASE) 50 MCG/ACT nasal spray, 2 sprays into the nostril(s) as directed by provider As Needed for Rhinitis., Disp: , Rfl:   •  losartan (COZAAR) 25 MG tablet, Take 25 mg by mouth Daily., Disp: , Rfl:   •  pantoprazole (PROTONIX) 40 MG EC tablet, Take 1 tablet by mouth 2 (Two) Times a Day Before Meals., Disp: 180 tablet, Rfl: 3  •  sucralfate (CARAFATE) 1 g tablet, Take 1 tablet by mouth 4 (Four) Times a Day Before Meals & at Bedtime As Needed (Heartburn)., Disp: 360 tablet, Rfl: 3  •  traMADol (ULTRAM) 50 MG tablet, Take 1 tablet by mouth Every 6 (Six) Hours As Needed for Moderate Pain ., Disp: 60 tablet, Rfl: 5  •  traZODone (DESYREL) 100 MG tablet, Take 100 mg by mouth Every Night., Disp: , Rfl:     ALLERGIES  Mobic [meloxicam]    VITALS  Vitals:    08/13/20 0914   BP: 132/70   Resp: 18   Weight: 56.7 kg (125 lb)   Height: 162.6 cm (64.02\")       LAST RESULTS   Orders Only on 04/30/2019   Component Date Value Ref Range Status   • Conv .conv 04/30/2019 Comment   Final    Comment: Material submitted:                                        .  cheek - LEFT CHEEK. Modifiers: left     • . 04/30/2019 Comment   Final    Comment: Diagnosis:  RUPTURED AND INFLAMED " "EPIDERMOID CYST.  TMZ/05/03/2019       • . 04/30/2019 Comment   Final    Comment: Electronically signed:                                     .  Reina Kat MD, Dermatopathologist     • . 04/30/2019 Comment   Final    Comment: Gross description:                                         .  RECEIVED IN FORMALIN LABELED WITH PATIENT NAME, DEMOGRAPHICS AND \"LEFT  CHEEK\" CONSISTS OF ONE FRAGMENT OF TAN-GRAY SKIN MEASURING 1.5 X 0.5 X  0.3 CM. THE RESECTION MARGIN IS INKED IN ORANGE. THE SPECIMEN IS  BISECTED AND ENTIRELY SUBMITTED IN ONE CASSETTE.  LINNEA/KELLI     • . 04/30/2019 Comment   Final    Comment: Pathologist provided ICD-10:  L72.0     • . 04/30/2019 Comment   Final    Comment: CPT                                                        .  [id number]       No results found.    PHYSICAL EXAM  Debilities/Disabilities Identified: None  Emotional Behavior: Appropriate  Physical Exam   Constitutional: She is oriented to person, place, and time. She appears well-developed and well-nourished.   HENT:   Head: Normocephalic and atraumatic.   Negative for candida   Eyes: Pupils are equal, round, and reactive to light. Conjunctivae are normal.   Neck: Normal range of motion. Neck supple.   Cardiovascular: Normal rate and regular rhythm.   Pulmonary/Chest: Effort normal and breath sounds normal.   Abdominal: Soft. Bowel sounds are normal. She exhibits no distension. There is tenderness in the epigastric area.   Musculoskeletal: Normal range of motion.   Lymphadenopathy:     She has no cervical adenopathy.   Neurological: She is alert and oriented to person, place, and time.   Skin: Skin is warm and dry.   Psychiatric: She has a normal mood and affect. Her behavior is normal.   Nursing note and vitals reviewed.      ASSESSMENT  Diagnoses and all orders for this visit:    Gastroesophageal reflux disease with esophagitis  -     pantoprazole (PROTONIX) 40 MG EC tablet; Take 1 tablet by mouth 2 (Two) Times a Day Before Meals.  -     " sucralfate (CARAFATE) 1 g tablet; Take 1 tablet by mouth 4 (Four) Times a Day Before Meals & at Bedtime As Needed (Heartburn).    Candidal esophagitis (CMS/HCC)    Personal history of colonic polyps    Primary osteoarthritis of both hands  -     traMADol (ULTRAM) 50 MG tablet; Take 1 tablet by mouth Every 6 (Six) Hours As Needed for Moderate Pain .          PLAN  Return in about 6 months (around 2/13/2021).   3/2022 recall for colon was done in [facility] 3/23/2017. Continue on Protonix bid and carafate qid and low acid diet.   Recommend daily probiotic. Increase your Colace dose as needed (250mg). Tramadol for arthritis and avoid ibuprofen, aleve etc. Take tylenol three times a day as needed.

## 2020-09-23 ENCOUNTER — ANESTHESIA (OUTPATIENT)
Dept: PAIN MEDICINE | Facility: HOSPITAL | Age: 65
End: 2020-09-23

## 2020-09-23 ENCOUNTER — ANESTHESIA EVENT (OUTPATIENT)
Dept: PAIN MEDICINE | Facility: HOSPITAL | Age: 65
End: 2020-09-23

## 2020-09-23 ENCOUNTER — HOSPITAL ENCOUNTER (OUTPATIENT)
Dept: PAIN MEDICINE | Facility: HOSPITAL | Age: 65
Discharge: HOME | End: 2020-09-23

## 2020-09-23 ENCOUNTER — HOSPITAL ENCOUNTER (OUTPATIENT)
Dept: GENERAL RADIOLOGY | Facility: HOSPITAL | Age: 65
Discharge: HOME | End: 2020-09-23

## 2020-09-23 VITALS
RESPIRATION RATE: 16 BRPM | SYSTOLIC BLOOD PRESSURE: 125 MMHG | BODY MASS INDEX: 21.34 KG/M2 | TEMPERATURE: 98.4 F | HEART RATE: 62 BPM | WEIGHT: 125 LBS | HEIGHT: 64 IN | DIASTOLIC BLOOD PRESSURE: 71 MMHG | OXYGEN SATURATION: 99 %

## 2020-09-23 DIAGNOSIS — R52 PAIN: ICD-10-CM

## 2020-09-23 DIAGNOSIS — M51.36 DDD (DEGENERATIVE DISC DISEASE), LUMBAR: Primary | ICD-10-CM

## 2020-09-23 PROCEDURE — 77003 FLUOROGUIDE FOR SPINE INJECT: CPT

## 2020-09-23 PROCEDURE — C1755 CATHETER, INTRASPINAL: HCPCS

## 2020-09-23 PROCEDURE — 0 IOPAMIDOL 41 % SOLUTION: Performed by: ANESTHESIOLOGY

## 2020-09-23 PROCEDURE — 25010000002 METHYLPREDNISOLONE PER 80 MG: Performed by: ANESTHESIOLOGY

## 2020-09-23 RX ORDER — METHYLPREDNISOLONE ACETATE 80 MG/ML
80 INJECTION, SUSPENSION INTRA-ARTICULAR; INTRALESIONAL; INTRAMUSCULAR; SOFT TISSUE ONCE
Status: COMPLETED | OUTPATIENT
Start: 2020-09-23 | End: 2020-09-23

## 2020-09-23 RX ORDER — MIDAZOLAM HYDROCHLORIDE 1 MG/ML
1 INJECTION INTRAMUSCULAR; INTRAVENOUS
Status: DISCONTINUED | OUTPATIENT
Start: 2020-09-23 | End: 2020-09-24 | Stop reason: HOSPADM

## 2020-09-23 RX ORDER — FENTANYL CITRATE 50 UG/ML
50 INJECTION, SOLUTION INTRAMUSCULAR; INTRAVENOUS AS NEEDED
Status: DISCONTINUED | OUTPATIENT
Start: 2020-09-23 | End: 2020-09-24 | Stop reason: HOSPADM

## 2020-09-23 RX ORDER — LIDOCAINE HYDROCHLORIDE 10 MG/ML
1 INJECTION, SOLUTION INFILTRATION; PERINEURAL ONCE
Status: DISCONTINUED | OUTPATIENT
Start: 2020-09-23 | End: 2020-09-24 | Stop reason: HOSPADM

## 2020-09-23 RX ADMIN — METHYLPREDNISOLONE ACETATE 80 MG: 80 INJECTION, SUSPENSION INTRA-ARTICULAR; INTRALESIONAL; INTRAMUSCULAR; SOFT TISSUE at 08:50

## 2020-09-23 RX ADMIN — IOPAMIDOL 10 ML: 408 INJECTION, SOLUTION INTRATHECAL at 08:50

## 2020-09-23 NOTE — ANESTHESIA PROCEDURE NOTES
PAIN Epidural block    Pre-sedation assessment completed: 9/23/2020 8:43 AM    Patient reassessed immediately prior to procedure    Patient location during procedure: pain clinic  Start Time: 9/23/2020 8:44 AM  Stop Time: 9/23/2020 8:52 AM  Indication:at surgeon's request and procedure for pain  Performed By  Anesthesiologist: Jesu Rueda MD  Preanesthetic Checklist  Completed: patient identified, site marked, surgical consent, pre-op evaluation, timeout performed, IV checked, risks and benefits discussed and monitors and equipment checked  Additional Notes  Dx:  Post-Op Diagnosis Codes:     * Lumbar radiculopathy (M54.16)     * Lumbar degenerative disc disease (M51.36)  80 mg depomedrol in epid    Plan : return to clinic as needed  Prep:  Pt Position:prone (prone)  Sterile Tech:cap, gloves, mask and sterile barrier  Prep:chlorhexidine gluconate and isopropyl alcohol  Monitoring:blood pressure monitoring, EKG and continuous pulse oximetry  Procedure:Sedation: no     Approach:left paramedian  Guidance: fluoroscopy and c arm pa and lat and loss of resistance  Location:lumbar  Level:4-5 (interlaminar)  Needle Type:Jessie  Needle Gauge:20  Aspiration:negative  Medications:  Depomedrol:80 mg  Preservative Free Saline:3mL  Isovue:2mL    Post Assessment:  Post-procedure: bandaid.  Pt Tolerance:patient tolerated the procedure well with no apparent complications  Complications:no

## 2020-09-23 NOTE — H&P
INTERVAL HISTORY:    The patient returns for another Lumbar epidural steroid injection 4 today.  They have received 90 % improvement since their last injection with a pain level of 5 /10 at its worst recently.    Conservative measures tried in the interim. Daily activities are still affected by the pain.    Radiology reports and/or previous notes have been reviewed and are consistent with their diagnosis of Post-Op Diagnosis Codes:     * Lumbar radiculopathy [M54.16]     * Lumbar degenerative disc disease [M51.36]    Alert and oriented  MP - 2  Lungs - clear  CV - rrr    Diagnosis:  Post-Op Diagnosis Codes:     * Lumbar radiculopathy [M54.16]     * Lumbar degenerative disc disease [M51.36]      Plan:  Lumbar epidural steroid injection under fluoroscopic guidance        Target : L4-5    I have encouraged them to continue:  1.  Physical therapy exercises at home as prescribed by physical therapy or from the pain clinic handout (given to the patient).  Continuation of these exercises every day, or multiple times per week, even when the patient has good pain relief, was stressed to the patient as a preventative measure to decrease the frequency and severity of future pain episodes.  2.  Continue pain medicines as already prescribed.  If patient not currently taking any, it is recommended to begin Acetaminophen 1000 mg po q 8 hours.  If other medicines containing Acetaminophen are currently prescribed, maintain daily dose at 3000mg.    3.  If they can tolerate NSAIDS, it is recommended to take Ibuprofen 600 mg po q 6 hours for 7 days during pain exacerbations.   Alternatively, they may substitute an NSAID of their choice (e.g. Aleve)  4.  Heat and ice to the affected area as tolerated for pain control.  It was discussed that heating pads can cause burns.  5.  Low impact exercise such as walking or water exercise was recommended to maintain overall health and aid in weight control.   6.  Follow up as needed for subsequent  injections.  7.  Patient was counseled to abstain from tobacco products.

## 2020-11-02 ENCOUNTER — TRANSCRIBE ORDERS (OUTPATIENT)
Dept: ADMINISTRATIVE | Facility: HOSPITAL | Age: 65
End: 2020-11-02

## 2020-11-02 DIAGNOSIS — Z12.31 VISIT FOR SCREENING MAMMOGRAM: Primary | ICD-10-CM

## 2020-11-24 ENCOUNTER — TRANSCRIBE ORDERS (OUTPATIENT)
Dept: ADMINISTRATIVE | Facility: HOSPITAL | Age: 65
End: 2020-11-24

## 2020-11-24 DIAGNOSIS — Z78.0 MENOPAUSE: Primary | ICD-10-CM

## 2020-12-07 ENCOUNTER — APPOINTMENT (OUTPATIENT)
Dept: BONE DENSITY | Facility: HOSPITAL | Age: 65
End: 2020-12-07

## 2020-12-07 ENCOUNTER — HOSPITAL ENCOUNTER (OUTPATIENT)
Dept: MAMMOGRAPHY | Facility: HOSPITAL | Age: 65
Discharge: HOME | End: 2020-12-07
Admitting: FAMILY MEDICINE

## 2020-12-07 DIAGNOSIS — Z78.0 MENOPAUSE: ICD-10-CM

## 2020-12-07 DIAGNOSIS — Z12.31 VISIT FOR SCREENING MAMMOGRAM: ICD-10-CM

## 2020-12-07 PROCEDURE — 77080 DXA BONE DENSITY AXIAL: CPT

## 2020-12-07 PROCEDURE — 77067 SCR MAMMO BI INCL CAD: CPT

## 2020-12-07 PROCEDURE — 77063 BREAST TOMOSYNTHESIS BI: CPT

## 2021-02-11 ENCOUNTER — TELEMEDICINE (OUTPATIENT)
Dept: GASTROENTEROLOGY | Facility: CLINIC | Age: 66
End: 2021-02-11

## 2021-02-11 DIAGNOSIS — Z86.010 PERSONAL HISTORY OF COLONIC POLYPS: ICD-10-CM

## 2021-02-11 DIAGNOSIS — B37.81 CANDIDAL ESOPHAGITIS (HCC): ICD-10-CM

## 2021-02-11 DIAGNOSIS — D50.0 IRON DEFICIENCY ANEMIA DUE TO CHRONIC BLOOD LOSS: ICD-10-CM

## 2021-02-11 DIAGNOSIS — K21.00 GASTROESOPHAGEAL REFLUX DISEASE WITH ESOPHAGITIS WITHOUT HEMORRHAGE: Primary | ICD-10-CM

## 2021-02-11 PROCEDURE — 99213 OFFICE O/P EST LOW 20 MIN: CPT | Performed by: INTERNAL MEDICINE

## 2021-02-11 NOTE — PROGRESS NOTES
Lila Pabon is a 65 y.o. female who presents with   Chief Complaint   Patient presents with   • Follow-up     6 mo f/u on Gerd       Subjective     Been doing well on her usual diet Pantoprazole and carafate , no interveneing heal;th issues and tolerates Tram,adol for OA but needs to take with food.  Is still on her Yoga and hula hoops daily but still her weight is up despite that due to night snacking     Reviewed her Past colon in [place] was 2017 and with polyps so will recall in 2022 and use her birthday month [birth year] for recall    Lost connection from her phone visit    EMILI continue Iron and will get her last labs from PCP        Past Medical History:   Diagnosis Date   • Anemia     IRON SUPPLEMENTS   • Arthritis    • Colon polyp    • Esophagitis 6/28/2018   • GERD (gastroesophageal reflux disease)    • Hypertension    • Stress-induced cardiomyopathy 1/15/2019   • Type 2 myocardial infarction (CMS/HCC) 1/15/2019       Social History     Socioeconomic History   • Marital status:      Spouse name: Not on file   • Number of children: Not on file   • Years of education: Not on file   • Highest education level: Not on file   Tobacco Use   • Smoking status: Former Smoker     Years: 44.00     Types: Cigarettes     Quit date: 12/20/2010     Years since quitting: 10.1   • Smokeless tobacco: Never Used   Substance and Sexual Activity   • Alcohol use: Yes     Comment: Rare   • Drug use: No   • Sexual activity: Defer         Current Outpatient Medications:   •  aspirin 81 MG chewable tablet, Chew 81 mg Daily., Disp: , Rfl:   •  calcium carb-cholecalciferol (CALCIUM 600+D) 600-800 MG-UNIT tablet, Take 1 tablet by mouth Every Night., Disp: , Rfl:   •  docusate sodium (COLACE) 100 MG capsule, Take 100 mg by mouth Every Night., Disp: , Rfl:   •  estrogens, conjugated, (PREMARIN) 0.45 MG tablet, Take 0.45 mg by mouth Every Night. Take daily for 21 days then do not take for 7 days. , Disp: , Rfl:   •  ferrous sulfate 140 (45  Fe) MG tablet controlled-release tablet, Take 140 mg by mouth Daily With Breakfast., Disp: , Rfl:   •  fluticasone (FLONASE) 50 MCG/ACT nasal spray, 2 sprays into the nostril(s) as directed by provider As Needed for Rhinitis., Disp: , Rfl:   •  losartan (COZAAR) 25 MG tablet, Take 25 mg by mouth Daily., Disp: , Rfl:   •  pantoprazole (PROTONIX) 40 MG EC tablet, Take 1 tablet by mouth 2 (Two) Times a Day Before Meals., Disp: 180 tablet, Rfl: 3  •  sucralfate (CARAFATE) 1 g tablet, Take 1 tablet by mouth 4 (Four) Times a Day Before Meals & at Bedtime As Needed (Heartburn)., Disp: 360 tablet, Rfl: 3  •  traMADol (ULTRAM) 50 MG tablet, Take 1 tablet by mouth Every 6 (Six) Hours As Needed for Moderate Pain ., Disp: 60 tablet, Rfl: 5  •  traZODone (DESYREL) 100 MG tablet, Take 100 mg by mouth Every Night., Disp: , Rfl:     Review of Systems    Objective   There were no vitals filed for this visit.  There were no vitals filed for this visit.  There is no height or weight on file to calculate BMI.      Physical Exam  Phone visit due to connectivity    WBC   Date Value Ref Range Status   12/06/2018 7.80 4.80 - 10.80 10*3/mm3 Final     RBC   Date Value Ref Range Status   12/06/2018 3.23 (L) 4.20 - 5.40 10*6/mm3 Final     Hemoglobin   Date Value Ref Range Status   12/10/2018 10.5 (L) 12.0 - 17.0 g/dL Final   12/06/2018 9.7 (L) 12.0 - 16.0 g/dL Final     Hematocrit   Date Value Ref Range Status   12/10/2018 31 (L) 38 - 51 % Final   12/06/2018 29.4 (L) 37.0 - 47.0 % Final     MCV   Date Value Ref Range Status   12/06/2018 91.0 81.0 - 99.0 fL Final     MCH   Date Value Ref Range Status   12/06/2018 30.0 27.0 - 31.0 pg Final     MCHC   Date Value Ref Range Status   12/06/2018 33.0 31.0 - 37.0 g/dL Final     RDW   Date Value Ref Range Status   12/06/2018 13.4 11.5 - 14.5 % Final     RDW-SD   Date Value Ref Range Status   12/06/2018 44.7 37.0 - 54.0 fl Final     MPV   Date Value Ref Range Status   12/06/2018 11.7 (H) 7.4 - 10.4 fL  Final     Platelets   Date Value Ref Range Status   12/06/2018 151 140 - 500 10*3/mm3 Final     Neutrophil %   Date Value Ref Range Status   12/04/2018 79.2 (H) 45.0 - 70.0 % Final     Lymphocyte %   Date Value Ref Range Status   12/04/2018 10.1 (L) 20.0 - 45.0 % Final     Monocyte %   Date Value Ref Range Status   12/04/2018 9.7 (H) 3.0 - 8.0 % Final     Eosinophil %   Date Value Ref Range Status   12/04/2018 0.2 0.0 - 4.0 % Final     Basophil %   Date Value Ref Range Status   12/04/2018 0.4 0.0 - 2.0 % Final     Immature Grans %   Date Value Ref Range Status   12/04/2018 0.4 0.0 - 0.5 % Final     Neutrophils, Absolute   Date Value Ref Range Status   12/04/2018 10.79 (H) 1.50 - 8.30 10*3/mm3 Final     Lymphocytes, Absolute   Date Value Ref Range Status   12/04/2018 1.38 0.60 - 4.80 10*3/mm3 Final     Monocytes, Absolute   Date Value Ref Range Status   12/04/2018 1.32 (H) 0.00 - 1.00 10*3/mm3 Final     Eosinophils, Absolute   Date Value Ref Range Status   12/04/2018 0.03 (L) 0.10 - 0.30 10*3/mm3 Final     Basophils, Absolute   Date Value Ref Range Status   12/04/2018 0.06 0.00 - 0.20 10*3/mm3 Final     Immature Grans, Absolute   Date Value Ref Range Status   12/04/2018 0.06 (H) 0.00 - 0.03 10*3/mm3 Final     nRBC   Date Value Ref Range Status   12/04/2018 0.0 0.0 - 0.0 /100 WBC Final       Lab Results   Component Value Date    GLUCOSE 91 12/07/2018    BUN 10 12/07/2018    CREATININE 0.91 12/07/2018    EGFRIFNONA 62 12/07/2018    BCR 11.0 12/07/2018    CO2 28.4 12/07/2018    CALCIUM 8.7 (L) 12/07/2018    ALBUMIN 3.40 (L) 12/05/2018    AST 53 (H) 12/05/2018    ALT 50 (H) 12/05/2018         Imaging Results (Last 7 Days)     ** No results found for the last 168 hours. **            Assessment/Plan   Diagnoses and all orders for this visit:    1. Gastroesophageal reflux disease with esophagitis without hemorrhage (Primary)    2. Personal history of colonic polyps    3. Candidal esophagitis (CMS/HCC)    4. Iron deficiency  anemia due to chronic blood loss      Will continue present meds an get updated labs from Dr Zhang and follow up in 6 months also will recall her colon in 9/2022.  Phone visit [id number] min total   Dictated utilizing Dragon dictation

## 2021-02-11 NOTE — PROGRESS NOTES
"{[facility]telSt[facility]::\"Video visit:  You have chosen to receive care through a telehealth visit.  Do you consent to use a video/audio connection for your medical care today? Yes\"}    PATIENT INFORMATION  Lila Pabon       - 1955    CHIEF COMPLAINT  Chief Complaint   Patient presents with   • Follow-up     6 mo f/u on [facility]erd       HISTORY OF PRESENT ILLNESS  HPI    REVIEWED PERTINENT RESULTS/ LABS  Lab Results   Component Value Date    CASEREPORT  2018     Surgical Pathology Report                         Case: [id number]                                  Authorizing Provider:  Laureano Rehman        Collected:           2018 04:34 PM                                 MD Silvia                                                                   Ordering Location:     [facility]   Received:            2018 05:00 PM                                 OR                                                                           Pathologist:           Hilario Maloney MD                                                     Specimens:   1) - Stomach, Gastric Biopsy                                                                        2) - Esophagus, Distal, Distal Esophagus Biopsy                                            FINALDX  2018     1. Gastric Biopsy:   A. Benign gastric mucosa with no evidence of active gastritis nor neoplasm identified.   B. No metaplastic nor dysplastic change is identified.   C. Negative for Helicobacter.    2. Distal Esophageal Biopsy:   A. Benign gastric mucosa with diffuse mild chronic inflammation.   B. No metaplastic nor dysplastic change is identified.   C. Benign squamous mucosa with focal mild acute inflammation.   D. Fungal hyphae and yeast forms morphologically consistent with Candida species.    DMA/jse        Lab Results   Component Value Date    HGB 10.5 (L) 12/10/2018    MCV 91.0 2018     2018    ALT 50 (H) " 12/05/2018    AST 53 (H) 12/05/2018      No results found.    REVIEW OF SYSTEMS  Review of Systems      ACTIVE PROBLEMS  Patient Active Problem List    Diagnosis   • Personal history of colonic polyps [Z86.010]   • Gastroesophageal reflux disease with esophagitis [K21.00]   • Adenomatous polyp of colon [D12.6]   • Stress-induced cardiomyopathy [I51.81]   • Type 2 myocardial infarction (CMS/HCC) [I21.A1]   • Esophagitis [K20.90]         PAST MEDICAL HISTORY  Past Medical History:   Diagnosis Date   • Anemia     IRON SUPPLEMENTS   • Arthritis    • Colon polyp    • Esophagitis 6/28/2018   • GERD (gastroesophageal reflux disease)    • Hypertension    • Stress-induced cardiomyopathy 1/15/2019   • Type 2 myocardial infarction (CMS/HCC) 1/15/2019         SURGICAL HISTORY  Past Surgical History:   Procedure Laterality Date   • CARDIAC CATHETERIZATION N/A 6/26/2018    Procedure: Left Heart Cath and Cors;  Surgeon: Suhas Almonte MD;  Location: [facility] CATH INVASIVE LOCATION;  Service: Cardiovascular   • CARDIAC CATHETERIZATION N/A 6/26/2018    Procedure: Left ventriculography;  Surgeon: Suhas Almonte MD;  Location: [facility] CATH INVASIVE LOCATION;  Service: Cardiovascular   • CARDIAC CATHETERIZATION N/A 12/10/2018    Procedure: Right and Left Heart Cath;  Surgeon: Laquita Jessica MD;  Location: [facility] CATH INVASIVE LOCATION;  Service: Cardiology   • CARDIAC CATHETERIZATION N/A 12/10/2018    Procedure: Coronary angiography;  Surgeon: Laquita Jessica MD;  Location: [facility] CATH INVASIVE LOCATION;  Service: Cardiology   • CARDIAC CATHETERIZATION N/A 12/10/2018    Procedure: Left ventriculography;  Surgeon: Laquita Jessica MD;  Location: [facility] CATH INVASIVE LOCATION;  Service: Cardiology   • CATARACT EXTRACTION Bilateral    • CATARACT EXTRACTION     • COLONOSCOPY     • ENDOSCOPY N/A 6/27/2018    Procedure: ESOPHAGOGASTRODUODENOSCOPY with biopsies;  Surgeon: Rupa Hays MD;  Location: [facility] ENDOSCOPY;   Service: Gastroenterology   • ENDOSCOPY N/A 9/18/2018    Procedure: ESOPHAGOGASTRODUODENOSCOPY with biopsies;  Surgeon: Rupa Hays MD;  [facility];  Service: Gastroenterology   • ENDOSCOPY N/A 12/7/2018    Procedure: ESOPHAGOGASTRODUODENOSCOPY JWITH BIOPSIES;  Surgeon: Laureano Rehman MD;  Location:  [facility] OR;  Service: Gastroenterology   • EPIDURAL BLOCK     • GLAUCOMA SURGERY     • HYSTERECTOMY     • SHOULDER SURGERY Right     RUPTURED BICEP TENDON   • SHOULDER SURGERY           FAMILY HISTORY  Family History   Problem Relation Age of Onset   • Breast cancer Mother    • Breast cancer Maternal Aunt    • Colon polyps Son    • Colon cancer Neg Hx          SOCIAL HISTORY  Social History     Occupational History   • Not on file   Tobacco Use   • Smoking status: Former Smoker     Years: 44.00     Types: Cigarettes     Quit date: 12/20/2010     Years since quitting: 10.1   • Smokeless tobacco: Never Used   Substance and Sexual Activity   • Alcohol use: Yes     Comment: Rare   • Drug use: No   • Sexual activity: Defer         CURRENT MEDICATIONS    Current Outpatient Medications:   •  aspirin 81 MG chewable tablet, Chew 81 mg Daily., Disp: , Rfl:   •  calcium carb-cholecalciferol (CALCIUM 600+D) 600-800 MG-UNIT tablet, Take 1 tablet by mouth Every Night., Disp: , Rfl:   •  docusate sodium (COLACE) 100 MG capsule, Take 100 mg by mouth Every Night., Disp: , Rfl:   •  estrogens, conjugated, (PREMARIN) 0.45 MG tablet, Take 0.45 mg by mouth Every Night. Take daily for 21 days then do not take for 7 days. , Disp: , Rfl:   •  ferrous sulfate 140 (45 Fe) MG tablet controlled-release tablet, Take 140 mg by mouth Daily With Breakfast., Disp: , Rfl:   •  fluticasone (FLONASE) 50 MCG/ACT nasal spray, 2 sprays into the nostril(s) as directed by provider As Needed for Rhinitis., Disp: , Rfl:   •  losartan (COZAAR) 25 MG tablet, Take 25 mg by mouth Daily., Disp: , Rfl:   •  pantoprazole (PROTONIX) 40 MG  "EC tablet, Take 1 tablet by mouth 2 (Two) Times a Day Before Meals., Disp: 180 tablet, Rfl: 3  •  sucralfate (CARAFATE) 1 g tablet, Take 1 tablet by mouth 4 (Four) Times a Day Before Meals & at Bedtime As Needed (Heartburn)., Disp: 360 tablet, Rfl: 3  •  traMADol (ULTRAM) 50 MG tablet, Take 1 tablet by mouth Every 6 (Six) Hours As Needed for Moderate Pain ., Disp: 60 tablet, Rfl: 5  •  traZODone (DESYREL) 100 MG tablet, Take 100 mg by mouth Every Night., Disp: , Rfl:     ALLERGIES  Mobic [meloxicam]    VITALS  There were no vitals filed for this visit.    PHYSICAL EXAM  Debilities/Disabilities Identified: {Debilities/Disabilities :78721::\"None\"}  Emotional Behavior: {Emotional Behavior:21134::\"Appropriate\"}  Wt Readings from Last 3 Encounters:   09/23/20 56.7 kg (125 lb)   08/13/20 56.7 kg (125 lb)   06/23/20 57.6 kg (127 lb)     Ht Readings from Last 1 Encounters:   09/23/20 162.6 cm (64\")     There is no height or weight on file to calculate BMI.  Physical Exam    CLINICAL DATA REVIEWED   {CLINICAL DATA REVIEWED:63893::\"reviewed previous lab results and integrated with today's visit\",\"reviewed notes from other physicians and/or last GI encounter\",\"reviewed previous endoscopy results and available photos\",\"reviewed surgical pathology results from previous biopsies\"}    ASSESSMENT  There are no diagnoses linked to this encounter.      PLAN  No follow-ups on file.    I have discussed the above plan with the patient.  They verbalize understanding and are in agreement with the plan.  They have been advised to contact the office for any questions, concerns, or changes related to their health.                      "

## 2021-02-17 ENCOUNTER — OFFICE VISIT (OUTPATIENT)
Dept: ORTHOPEDIC SURGERY | Facility: CLINIC | Age: 66
End: 2021-02-17

## 2021-02-17 VITALS — BODY MASS INDEX: 21.34 KG/M2 | WEIGHT: 125 LBS | HEIGHT: 64 IN | TEMPERATURE: 97.6 F

## 2021-02-17 DIAGNOSIS — M43.16 SPONDYLOLISTHESIS OF LUMBAR REGION: ICD-10-CM

## 2021-02-17 DIAGNOSIS — M71.30 SYNOVIAL CYST: ICD-10-CM

## 2021-02-17 DIAGNOSIS — R52 PAIN: Primary | ICD-10-CM

## 2021-02-17 DIAGNOSIS — M48.062 SPINAL STENOSIS OF LUMBAR REGION WITH NEUROGENIC CLAUDICATION: ICD-10-CM

## 2021-02-17 PROCEDURE — 99214 OFFICE O/P EST MOD 30 MIN: CPT | Performed by: ORTHOPAEDIC SURGERY

## 2021-02-17 PROCEDURE — 72100 X-RAY EXAM L-S SPINE 2/3 VWS: CPT | Performed by: ORTHOPAEDIC SURGERY

## 2021-02-17 NOTE — PROGRESS NOTES
New patient or new problem visit    CC: Left leg pain    HPI: She has left leg pain, imbalance and low back pain from lumbar spinal stenosis.  I saw her a little more than a year ago when she underwent epidural injections the first of which worked immensely well but then they tapered off.  Her balance has deteriorated over time.  She has back and left buttock and leg pain.    PFSH: See attached.  She quit smoking 10 years ago and is very active on her property.    ROS: No bowel or bladder complaints    PE: On exam she has intact patellar reflexes and good strength in all tested groups in the lower extremities.  Sensation appears grossly intact and her gait appears balanced    XRAY: Plain film x-rays show grade one 9 mm spondylolisthesis at L4-5 and look pretty good otherwise.  No change from prior MRI.  MRI scan from 2 years ago demonstrates left-sided synovial cyst causing nerve compression and then moderate to severe spinal stenosis at L4-5 where the spondylolisthesis is located.    Other: n/a    Impression: Lumbar spinal stenosis from L4-5 spondylolisthesis and left L3-4 synovial cyst.    Plan: She is an excellent candidate for decompression and fusion.  If MRI is not significantly changed she would be ideal for L4-5 decompression and fusion with in situ bone, and then a left L3-4 medial facetectomy which would take care of the cyst.  If it is worse we may need to do more and if we fused 2 levels she would need instrumentation but I think a 1 level fusion would be fine with just local bone graft.  I discussed the risks and benefits of posterior spinal fusion, including where applicable, laminectomy.  Risks include adverse anesthetic events such as death, stroke and myocardial infarction.  More specific surgical risks include infection, nonunion, hardware failure, spinal fluid leakage, nerve root injury or paralysis, visceral or vascular injury, persistent pain, deep venous thrombosis, and pulmonary embolism among  others. There is a 70 to 90 % chance of success.   Alternatives have been discussed.  She will be thinking of her options and will get a new MRI meantime I will call her with results and final recommendations.

## 2021-02-22 ENCOUNTER — TELEPHONE (OUTPATIENT)
Dept: ORTHOPEDIC SURGERY | Facility: CLINIC | Age: 66
End: 2021-02-22

## 2021-02-22 NOTE — TELEPHONE ENCOUNTER
Caller: KOLTON DING    Relationship to patient: SELF    Best call back number:     Chief complaint: BACK PAIN    Type of visit: MRI    Additional notes:PATIENT WAS SEEN ON 2/17 AND SAID WASN'T SURE IF SHE NEEDS TO CALL TO SCHEDULE MRI OR IF SHE WILL BE CALLED TO SET IT UP.  PLEASE ADVISE HER.

## 2021-02-24 ENCOUNTER — TELEPHONE (OUTPATIENT)
Dept: ORTHOPEDIC SURGERY | Facility: CLINIC | Age: 66
End: 2021-02-24

## 2021-02-24 NOTE — TELEPHONE ENCOUNTER
LVM for pt informing her MRI for LUMBAR spine was placed on 02/17. I told her she is just waiting on the [employer] scheduling dept now.

## 2021-02-24 NOTE — TELEPHONE ENCOUNTER
----- Message from Lila Pabon sent at 2/24/2021 10:04 AM EST -----  Regarding: Visit Follow-Up Question  Contact: [phone]  I had a Dr. ruelas with Dr. Cantu on 2/17/21.  We discussed the possibility of surgery and Dr. Cantu said he wanted another MRI of my lower back done and that someone would be contacting me for the appointment.  To date I have not heard from anyone or had anyone call from for an MRI appointment.  Please advise.  Thanks.

## 2021-03-05 ENCOUNTER — HOSPITAL ENCOUNTER (OUTPATIENT)
Dept: MRI IMAGING | Facility: HOSPITAL | Age: 66
End: 2021-03-05

## 2021-03-09 ENCOUNTER — HOSPITAL ENCOUNTER (OUTPATIENT)
Dept: MRI IMAGING | Facility: HOSPITAL | Age: 66
Discharge: HOME | End: 2021-03-09
Admitting: ORTHOPAEDIC SURGERY

## 2021-03-09 DIAGNOSIS — M48.062 SPINAL STENOSIS OF LUMBAR REGION WITH NEUROGENIC CLAUDICATION: ICD-10-CM

## 2021-03-09 DIAGNOSIS — M43.16 SPONDYLOLISTHESIS OF LUMBAR REGION: ICD-10-CM

## 2021-03-09 PROCEDURE — 72148 MRI LUMBAR SPINE W/O DYE: CPT

## 2021-03-10 ENCOUNTER — TELEPHONE (OUTPATIENT)
Dept: ORTHOPEDIC SURGERY | Facility: CLINIC | Age: 66
End: 2021-03-10

## 2021-03-10 NOTE — TELEPHONE ENCOUNTER
----- Message from Raul Cantu MD sent at 3/9/2021  1:13 PM EST -----  Please tell her that the MRI shows continued nerve compression at L4-5, but that L3-4 cyst is nearly gone and certainly not compressing the nerve at that level.  I think we could leave that level alone and simply perform L4-5 laminectomy and fusion with local bone graft only

## 2021-03-11 ENCOUNTER — TELEPHONE (OUTPATIENT)
Dept: ORTHOPEDIC SURGERY | Facility: CLINIC | Age: 66
End: 2021-03-11

## 2021-03-11 NOTE — TELEPHONE ENCOUNTER
Returned my call.  We discussed her MRI results.  She is having increasing back and leg pain.  And is going to talk to her  about possible surgery and then will get back with me

## 2021-03-12 ENCOUNTER — PREP FOR SURGERY (OUTPATIENT)
Dept: OTHER | Facility: HOSPITAL | Age: 66
End: 2021-03-12

## 2021-03-12 DIAGNOSIS — M48.062 SPINAL STENOSIS, LUMBAR REGION WITH NEUROGENIC CLAUDICATION: Primary | ICD-10-CM

## 2021-03-12 RX ORDER — CEFAZOLIN SODIUM 2 G/100ML
2 INJECTION, SOLUTION INTRAVENOUS ONCE
Status: CANCELLED | OUTPATIENT
Start: 2021-04-12 | End: 2021-03-12

## 2021-03-31 ENCOUNTER — TRANSCRIBE ORDERS (OUTPATIENT)
Dept: PREADMISSION TESTING | Facility: HOSPITAL | Age: 66
End: 2021-03-31

## 2021-04-05 ENCOUNTER — PRE-ADMISSION TESTING (OUTPATIENT)
Dept: PREADMISSION TESTING | Facility: HOSPITAL | Age: 66
End: 2021-04-05

## 2021-04-05 VITALS
HEART RATE: 55 BPM | TEMPERATURE: 97.4 F | RESPIRATION RATE: 18 BRPM | BODY MASS INDEX: 21.26 KG/M2 | HEIGHT: 65 IN | SYSTOLIC BLOOD PRESSURE: 131 MMHG | OXYGEN SATURATION: 99 % | WEIGHT: 127.6 LBS | DIASTOLIC BLOOD PRESSURE: 66 MMHG

## 2021-04-05 LAB
ANION GAP SERPL CALCULATED.3IONS-SCNC: 7.7 MMOL/L (ref 5–15)
BUN SERPL-MCNC: 14 MG/DL (ref 8–23)
BUN/CREAT SERPL: 13.1 (ref 7–25)
CALCIUM SPEC-SCNC: 8.8 MG/DL (ref 8.6–10.5)
CHLORIDE SERPL-SCNC: 103 MMOL/L (ref 98–107)
CO2 SERPL-SCNC: 28.3 MMOL/L (ref 22–29)
CREAT SERPL-MCNC: 1.07 MG/DL (ref 0.57–1)
DEPRECATED RDW RBC AUTO: 40.7 FL (ref 37–54)
ERYTHROCYTE [DISTWIDTH] IN BLOOD BY AUTOMATED COUNT: 12.9 % (ref 12.3–15.4)
GFR SERPL CREATININE-BSD FRML MDRD: 51 ML/MIN/1.73
GLUCOSE SERPL-MCNC: 75 MG/DL (ref 65–99)
HCT VFR BLD AUTO: 36 % (ref 34–46.6)
HGB BLD-MCNC: 12.3 G/DL (ref 12–15.9)
MCH RBC QN AUTO: 30.1 PG (ref 26.6–33)
MCHC RBC AUTO-ENTMCNC: 34.2 G/DL (ref 31.5–35.7)
MCV RBC AUTO: 88.2 FL (ref 79–97)
PLATELET # BLD AUTO: 170 10*3/MM3 (ref 140–450)
PMV BLD AUTO: 11.1 FL (ref 6–12)
POTASSIUM SERPL-SCNC: 4.3 MMOL/L (ref 3.5–5.2)
QT INTERVAL: 454 MS
RBC # BLD AUTO: 4.08 10*6/MM3 (ref 3.77–5.28)
SODIUM SERPL-SCNC: 139 MMOL/L (ref 136–145)
WBC # BLD AUTO: 6.01 10*3/MM3 (ref 3.4–10.8)

## 2021-04-05 PROCEDURE — 80048 BASIC METABOLIC PNL TOTAL CA: CPT

## 2021-04-05 PROCEDURE — 36415 COLL VENOUS BLD VENIPUNCTURE: CPT

## 2021-04-05 PROCEDURE — 85027 COMPLETE CBC AUTOMATED: CPT

## 2021-04-05 PROCEDURE — 93005 ELECTROCARDIOGRAM TRACING: CPT

## 2021-04-05 PROCEDURE — 93010 ELECTROCARDIOGRAM REPORT: CPT | Performed by: INTERNAL MEDICINE

## 2021-04-05 NOTE — DISCHARGE INSTRUCTIONS
ARRIVE DAY OF SURGERY AT  8:30 AM TO MAIN SURGERY, PLEASE VERIFY ARRIVAL TIME WITH OFFICE        Take the following medications the morning of surgery:  FLONASE NASAL      If you are on prescription narcotic pain medication to control your pain you may also take that medication the morning of surgery.    General Instructions:  • Do not eat solid food after midnight the night before surgery.  • You may drink clear liquids day of surgery but must stop at least one hour before your hospital arrival time.  • It is beneficial for you to have a clear drink that contains carbohydrates the day of surgery.  We suggest a 12 to 20 ounce bottle of Gatorade or Powerade for non-diabetic patients or a 12 to 20 ounce bottle of G2 or Powerade Zero for diabetic patients. (Pediatric patients, are not advised to drink a 12 to 20 ounce carbohydrate drink)    Clear liquids are liquids you can see through.  Nothing red in color.     Plain water                               Sports drinks  Sodas                                   Gelatin (Jell-O)  Fruit juices without pulp such as white grape juice and apple juice  Popsicles that contain no fruit or yogurt  Tea or coffee (no cream or milk added)  Gatorade / Powerade  G2 / Powerade Zero    • Infants may have breast milk up to four hours before surgery.  • Infants drinking formula may drink formula up to six hours before surgery.   • Patients who avoid smoking, chewing tobacco and alcohol for 4 weeks prior to surgery have a reduced risk of post-operative complications.  Quit smoking as many days before surgery as you can.  • Do not smoke, use chewing tobacco or drink alcohol the day of surgery.   • If applicable bring your C-PAP/ BI-PAP machine.  • Bring any papers given to you in the doctor’s office.  • Wear clean comfortable clothes.  • Do not wear contact lenses, false eyelashes or make-up.  Bring a case for your glasses.   • Bring crutches or walker if applicable.  • Remove all piercings.   Leave jewelry and any other valuables at home.  • Hair extensions with metal clips must be removed prior to surgery.  • The Pre-Admission Testing nurse will instruct you to bring medications if unable to obtain an accurate list in Pre-Admission Testing.            Preventing a Surgical Site Infection:  • For 2 to 3 days before surgery, avoid shaving with a razor because the razor can irritate skin and make it easier to develop an infection.    • Any areas of open skin can increase the risk of a post-operative wound infection by allowing bacteria to enter and travel throughout the body.  Notify your surgeon if you have any skin wounds / rashes even if it is not near the expected surgical site.  The area will need assessed to determine if surgery should be delayed until it is healed.  • The night prior to surgery shower using a fresh bar of anti-bacterial soap (such as Dial) and clean washcloth.  Sleep in a clean bed with clean clothing.  Do not allow pets to sleep with you.  • Shower on the morning of surgery using a fresh bar of anti-bacterial soap (such as Dial) and clean washcloth.  Dry with a clean towel and dress in clean clothing.  • Ask your surgeon if you will be receiving antibiotics prior to surgery.  • Make sure you, your family, and all healthcare providers clean their hands with soap and water or an alcohol based hand  before caring for you or your wound.    Day of surgery:  Your arrival time is approximately two hours before your scheduled surgery time.  Upon arrival, a Pre-op nurse and Anesthesiologist will review your health history, obtain vital signs, and answer questions you may have.  The only belongings needed at this time will be a list of your home medications and if applicable your C-PAP/BI-PAP machine.  A Pre-op nurse will start an IV and you may receive medication in preparation for surgery, including something to help you relax.     Please be aware that surgery does come with  discomfort.  We want to make every effort to control your discomfort so please discuss any uncontrolled symptoms with your nurse.   Your doctor will most likely have prescribed pain medications.      If you are going home after surgery you will receive individualized written care instructions before being discharged.  A responsible adult must drive you to and from the hospital on the day of your surgery and stay with you for 24 hours.  Discharge prescriptions can be filled by the hospital pharmacy during regular pharmacy hours.  If you are having surgery late in the day/evening your prescription may be e-prescribed to your pharmacy.  Please verify your pharmacy hours or chose a 24 hour pharmacy to avoid not having access to your prescription because your pharmacy has closed for the day.    If you are staying overnight following surgery, you will be transported to your hospital room following the recovery period.  [facility] has all private rooms.    If you have any questions please call Pre-Admission Testing at [phone].  Deductibles and co-payments are collected on the day of service. Please be prepared to pay the required co-pay, deductible or deposit on the day of service as defined by your plan.    Patient Education for Self-Quarantine Process    Following your COVID testing, we strongly recommend that you do not leave your home after you have been tested for COVID except to get medical care. This includes not going to work, school or to public areas.  If this is not possible for you to do please limit your activities to only required outings.  Be sure to wear a mask when you are with other people, practice social distancing and wash your hands frequently.      The following items provide additional details to keep you safe.  • Wash your hands with soap and water frequently for at least 20 seconds.   • Avoid touching your eyes, nose and mouth with unwashed hands.  • Do not share anything -  utensils, towels, food from the same bowl.   • Have your own utensils, drinking glass, dishes, towels and bedding.   • Do not have visitors.   • Do use FaceTime to stay in touch with family and friends.  • You should stay in a specific room away from others if possible.   • Stay at least 6 feet away from others in the home if you cannot have a dedicated room to yourself.   • Do not snuggle with your pet. While the CDC says there is no evidence that pets can spread COVID-19 or be infected from humans, it is probably best to avoid “petting, snuggling, being kissed or licked and sharing food (during self-quarantine)”, according to the CDC.   • Sanitize household surfaces daily. Include all high touch areas (door handles, light switches, phones, countertops, etc.)  • Do not share a bathroom with others, if possible.   • Wear a mask around others in your home if you are unable to stay in a separate room or 6 feet apart. If  you are unable to wear a mask, have your family member wear a mask if they must be within 6 feet of you.   Call your surgeon immediately if you experience any of the following symptoms:  • Sore Throat  • Shortness of Breath or difficulty breathing  • Cough  • Chills  • Body soreness or muscle pain  • Headache  • Fever  • New loss of taste or smell  • Do not arrive for your surgery ill.  Your procedure will need to be rescheduled to another time.  You will need to call your physician before the day of surgery to avoid any unnecessary exposure to hospital staff as well as other patients.

## 2021-04-09 ENCOUNTER — ANESTHESIA EVENT (OUTPATIENT)
Dept: PERIOP | Facility: HOSPITAL | Age: 66
End: 2021-04-09

## 2021-04-09 ENCOUNTER — LAB (OUTPATIENT)
Dept: LAB | Facility: HOSPITAL | Age: 66
End: 2021-04-09

## 2021-04-09 PROCEDURE — U0005 INFEC AGEN DETEC AMPLI PROBE: HCPCS

## 2021-04-09 PROCEDURE — C9803 HOPD COVID-19 SPEC COLLECT: HCPCS

## 2021-04-09 PROCEDURE — U0004 COV-19 TEST NON-CDC HGH THRU: HCPCS

## 2021-04-10 LAB — SARS-COV-2 ORF1AB RESP QL NAA+PROBE: NOT DETECTED

## 2021-04-12 ENCOUNTER — HOSPITAL ENCOUNTER (INPATIENT)
Facility: HOSPITAL | Age: 66
LOS: 2 days | Discharge: HOME | End: 2021-04-14
Attending: ORTHOPAEDIC SURGERY | Admitting: ORTHOPAEDIC SURGERY

## 2021-04-12 ENCOUNTER — ANESTHESIA (OUTPATIENT)
Dept: PERIOP | Facility: HOSPITAL | Age: 66
End: 2021-04-12

## 2021-04-12 ENCOUNTER — APPOINTMENT (OUTPATIENT)
Dept: GENERAL RADIOLOGY | Facility: HOSPITAL | Age: 66
End: 2021-04-12

## 2021-04-12 DIAGNOSIS — M48.062 SPINAL STENOSIS, LUMBAR REGION WITH NEUROGENIC CLAUDICATION: ICD-10-CM

## 2021-04-12 DIAGNOSIS — M48.062 SPINAL STENOSIS, LUMBAR REGION, WITH NEUROGENIC CLAUDICATION: Primary | ICD-10-CM

## 2021-04-12 LAB
ABO GROUP BLD: NORMAL
BLD GP AB SCN SERPL QL: NEGATIVE
HCT VFR BLD AUTO: 36.8 % (ref 34–46.6)
HGB BLD-MCNC: 12.6 G/DL (ref 12–15.9)
RH BLD: POSITIVE
T&S EXPIRATION DATE: NORMAL

## 2021-04-12 PROCEDURE — 22612 ARTHRD PST TQ 1NTRSPC LUMBAR: CPT | Performed by: ORTHOPAEDIC SURGERY

## 2021-04-12 PROCEDURE — 01NB0ZZ RELEASE LUMBAR NERVE, OPEN APPROACH: ICD-10-PCS | Performed by: ORTHOPAEDIC SURGERY

## 2021-04-12 PROCEDURE — 25010000002 LIDOCAINE PER 10 MG: Performed by: ANESTHESIOLOGY

## 2021-04-12 PROCEDURE — 86850 RBC ANTIBODY SCREEN: CPT | Performed by: ORTHOPAEDIC SURGERY

## 2021-04-12 PROCEDURE — 25010000002 PROPOFOL 10 MG/ML EMULSION: Performed by: ANESTHESIOLOGY

## 2021-04-12 PROCEDURE — 25010000002 MIDAZOLAM PER 1 MG: Performed by: ANESTHESIOLOGY

## 2021-04-12 PROCEDURE — S0260 H&P FOR SURGERY: HCPCS | Performed by: ORTHOPAEDIC SURGERY

## 2021-04-12 PROCEDURE — 25010000002 PHENYLEPHRINE PER 1 ML: Performed by: ANESTHESIOLOGY

## 2021-04-12 PROCEDURE — 25010000002 FENTANYL CITRATE (PF) 100 MCG/2ML SOLUTION: Performed by: ANESTHESIOLOGY

## 2021-04-12 PROCEDURE — 25010000003 HYDROMORPHONE HCL PF 50 MG/5ML SOLUTION: Performed by: ORTHOPAEDIC SURGERY

## 2021-04-12 PROCEDURE — 85014 HEMATOCRIT: CPT | Performed by: ORTHOPAEDIC SURGERY

## 2021-04-12 PROCEDURE — 76000 FLUOROSCOPY <1 HR PHYS/QHP: CPT

## 2021-04-12 PROCEDURE — 86900 BLOOD TYPING SEROLOGIC ABO: CPT | Performed by: ORTHOPAEDIC SURGERY

## 2021-04-12 PROCEDURE — 25010000003 CEFAZOLIN IN DEXTROSE 2-4 GM/100ML-% SOLUTION: Performed by: ORTHOPAEDIC SURGERY

## 2021-04-12 PROCEDURE — C1713 ANCHOR/SCREW BN/BN,TIS/BN: HCPCS | Performed by: ORTHOPAEDIC SURGERY

## 2021-04-12 PROCEDURE — 72100 X-RAY EXAM L-S SPINE 2/3 VWS: CPT

## 2021-04-12 PROCEDURE — 85018 HEMOGLOBIN: CPT | Performed by: ORTHOPAEDIC SURGERY

## 2021-04-12 PROCEDURE — 25010000002 NEOSTIGMINE 5 MG/10ML SOLUTION: Performed by: ANESTHESIOLOGY

## 2021-04-12 PROCEDURE — 0SG0071 FUSION OF LUMBAR VERTEBRAL JOINT WITH AUTOLOGOUS TISSUE SUBSTITUTE, POSTERIOR APPROACH, POSTERIOR COLUMN, OPEN APPROACH: ICD-10-PCS | Performed by: ORTHOPAEDIC SURGERY

## 2021-04-12 PROCEDURE — 25010000002 ONDANSETRON PER 1 MG: Performed by: ANESTHESIOLOGY

## 2021-04-12 PROCEDURE — 86901 BLOOD TYPING SEROLOGIC RH(D): CPT | Performed by: ORTHOPAEDIC SURGERY

## 2021-04-12 PROCEDURE — 25010000002 DEXAMETHASONE PER 1 MG: Performed by: ANESTHESIOLOGY

## 2021-04-12 PROCEDURE — 25010000003 CEFAZOLIN PER 500 MG: Performed by: ORTHOPAEDIC SURGERY

## 2021-04-12 PROCEDURE — 63047 LAM FACETEC & FORAMOT LUMBAR: CPT | Performed by: ORTHOPAEDIC SURGERY

## 2021-04-12 PROCEDURE — 20939 BONE MARROW ASPIR BONE GRFG: CPT | Performed by: ORTHOPAEDIC SURGERY

## 2021-04-12 DEVICE — FLOSEAL HEMOSTATIC MATRIX, 10ML
Type: IMPLANTABLE DEVICE | Site: SPINE LUMBAR | Status: FUNCTIONAL
Brand: FLOSEAL HEMOSTATIC MATRIX

## 2021-04-12 DEVICE — MATRX STRIP PILAFX DBM 50X25X4MM: Type: IMPLANTABLE DEVICE | Site: SPINE LUMBAR | Status: FUNCTIONAL

## 2021-04-12 RX ORDER — SODIUM CHLORIDE 9 MG/ML
INJECTION, SOLUTION INTRAVENOUS AS NEEDED
Status: DISCONTINUED | OUTPATIENT
Start: 2021-04-12 | End: 2021-04-12 | Stop reason: HOSPADM

## 2021-04-12 RX ORDER — LOSARTAN POTASSIUM 25 MG/1
25 TABLET ORAL NIGHTLY
Status: DISCONTINUED | OUTPATIENT
Start: 2021-04-12 | End: 2021-04-14 | Stop reason: HOSPADM

## 2021-04-12 RX ORDER — ONDANSETRON 2 MG/ML
INJECTION INTRAMUSCULAR; INTRAVENOUS AS NEEDED
Status: DISCONTINUED | OUTPATIENT
Start: 2021-04-12 | End: 2021-04-12 | Stop reason: SURG

## 2021-04-12 RX ORDER — TEMAZEPAM 15 MG/1
15 CAPSULE ORAL NIGHTLY PRN
Status: DISCONTINUED | OUTPATIENT
Start: 2021-04-12 | End: 2021-04-14 | Stop reason: HOSPADM

## 2021-04-12 RX ORDER — DOCUSATE SODIUM 100 MG/1
100 CAPSULE, LIQUID FILLED ORAL NIGHTLY
Status: DISCONTINUED | OUTPATIENT
Start: 2021-04-12 | End: 2021-04-14 | Stop reason: HOSPADM

## 2021-04-12 RX ORDER — CEFAZOLIN SODIUM 2 G/100ML
2 INJECTION, SOLUTION INTRAVENOUS ONCE
Status: COMPLETED | OUTPATIENT
Start: 2021-04-12 | End: 2021-04-12

## 2021-04-12 RX ORDER — SODIUM CHLORIDE 0.9 % (FLUSH) 0.9 %
3-10 SYRINGE (ML) INJECTION AS NEEDED
Status: DISCONTINUED | OUTPATIENT
Start: 2021-04-12 | End: 2021-04-12 | Stop reason: HOSPADM

## 2021-04-12 RX ORDER — MIDAZOLAM HYDROCHLORIDE 1 MG/ML
0.5 INJECTION INTRAMUSCULAR; INTRAVENOUS
Status: DISCONTINUED | OUTPATIENT
Start: 2021-04-12 | End: 2021-04-12 | Stop reason: HOSPADM

## 2021-04-12 RX ORDER — BISACODYL 10 MG
10 SUPPOSITORY, RECTAL RECTAL DAILY PRN
Status: DISCONTINUED | OUTPATIENT
Start: 2021-04-12 | End: 2021-04-14 | Stop reason: HOSPADM

## 2021-04-12 RX ORDER — MIDAZOLAM HYDROCHLORIDE 1 MG/ML
INJECTION INTRAMUSCULAR; INTRAVENOUS AS NEEDED
Status: DISCONTINUED | OUTPATIENT
Start: 2021-04-12 | End: 2021-04-12 | Stop reason: SURG

## 2021-04-12 RX ORDER — FENTANYL CITRATE 50 UG/ML
50 INJECTION, SOLUTION INTRAMUSCULAR; INTRAVENOUS
Status: DISCONTINUED | OUTPATIENT
Start: 2021-04-12 | End: 2021-04-12 | Stop reason: HOSPADM

## 2021-04-12 RX ORDER — NALOXONE HCL 0.4 MG/ML
0.1 VIAL (ML) INJECTION
Status: DISCONTINUED | OUTPATIENT
Start: 2021-04-12 | End: 2021-04-14 | Stop reason: HOSPADM

## 2021-04-12 RX ORDER — SODIUM CHLORIDE 0.9 % (FLUSH) 0.9 %
3 SYRINGE (ML) INJECTION EVERY 12 HOURS SCHEDULED
Status: DISCONTINUED | OUTPATIENT
Start: 2021-04-12 | End: 2021-04-14 | Stop reason: HOSPADM

## 2021-04-12 RX ORDER — LIDOCAINE HYDROCHLORIDE ANHYDROUS AND DEXTROSE MONOHYDRATE 5; 400 G/100ML; MG/100ML
INJECTION, SOLUTION INTRAVENOUS CONTINUOUS PRN
Status: DISCONTINUED | OUTPATIENT
Start: 2021-04-12 | End: 2021-04-12 | Stop reason: SURG

## 2021-04-12 RX ORDER — EPHEDRINE SULFATE 50 MG/ML
5 INJECTION, SOLUTION INTRAVENOUS ONCE AS NEEDED
Status: DISCONTINUED | OUTPATIENT
Start: 2021-04-12 | End: 2021-04-12 | Stop reason: HOSPADM

## 2021-04-12 RX ORDER — SUCRALFATE 1 G/1
1 TABLET ORAL
Status: DISCONTINUED | OUTPATIENT
Start: 2021-04-12 | End: 2021-04-14 | Stop reason: HOSPADM

## 2021-04-12 RX ORDER — FERROUS SULFATE 325(65) MG
325 TABLET ORAL
Status: DISCONTINUED | OUTPATIENT
Start: 2021-04-13 | End: 2021-04-14 | Stop reason: HOSPADM

## 2021-04-12 RX ORDER — ONDANSETRON 2 MG/ML
4 INJECTION INTRAMUSCULAR; INTRAVENOUS EVERY 6 HOURS PRN
Status: DISCONTINUED | OUTPATIENT
Start: 2021-04-12 | End: 2021-04-14 | Stop reason: HOSPADM

## 2021-04-12 RX ORDER — FLUTICASONE PROPIONATE 50 MCG
2 SPRAY, SUSPENSION (ML) NASAL NIGHTLY
Status: DISCONTINUED | OUTPATIENT
Start: 2021-04-12 | End: 2021-04-14 | Stop reason: HOSPADM

## 2021-04-12 RX ORDER — NEOSTIGMINE METHYLSULFATE 0.5 MG/ML
INJECTION, SOLUTION INTRAVENOUS AS NEEDED
Status: DISCONTINUED | OUTPATIENT
Start: 2021-04-12 | End: 2021-04-12 | Stop reason: SURG

## 2021-04-12 RX ORDER — GLYCOPYRROLATE 0.2 MG/ML
INJECTION INTRAMUSCULAR; INTRAVENOUS AS NEEDED
Status: DISCONTINUED | OUTPATIENT
Start: 2021-04-12 | End: 2021-04-12 | Stop reason: SURG

## 2021-04-12 RX ORDER — AMOXICILLIN 250 MG
1 CAPSULE ORAL 2 TIMES DAILY
Status: DISCONTINUED | OUTPATIENT
Start: 2021-04-12 | End: 2021-04-14 | Stop reason: HOSPADM

## 2021-04-12 RX ORDER — SODIUM CHLORIDE 0.9 % (FLUSH) 0.9 %
10 SYRINGE (ML) INJECTION AS NEEDED
Status: DISCONTINUED | OUTPATIENT
Start: 2021-04-12 | End: 2021-04-14 | Stop reason: HOSPADM

## 2021-04-12 RX ORDER — SODIUM CHLORIDE 9 MG/ML
100 INJECTION, SOLUTION INTRAVENOUS CONTINUOUS
Status: DISCONTINUED | OUTPATIENT
Start: 2021-04-12 | End: 2021-04-14 | Stop reason: HOSPADM

## 2021-04-12 RX ORDER — HYDROCODONE BITARTRATE AND ACETAMINOPHEN 7.5; 325 MG/1; MG/1
1 TABLET ORAL ONCE AS NEEDED
Status: DISCONTINUED | OUTPATIENT
Start: 2021-04-12 | End: 2021-04-12 | Stop reason: HOSPADM

## 2021-04-12 RX ORDER — ROCURONIUM BROMIDE 10 MG/ML
INJECTION, SOLUTION INTRAVENOUS AS NEEDED
Status: DISCONTINUED | OUTPATIENT
Start: 2021-04-12 | End: 2021-04-12 | Stop reason: SURG

## 2021-04-12 RX ORDER — CEFAZOLIN SODIUM 2 G/100ML
2 INJECTION, SOLUTION INTRAVENOUS EVERY 8 HOURS
Status: COMPLETED | OUTPATIENT
Start: 2021-04-12 | End: 2021-04-13

## 2021-04-12 RX ORDER — PROPOFOL 10 MG/ML
VIAL (ML) INTRAVENOUS AS NEEDED
Status: DISCONTINUED | OUTPATIENT
Start: 2021-04-12 | End: 2021-04-12 | Stop reason: SURG

## 2021-04-12 RX ORDER — POLYETHYLENE GLYCOL 3350 17 G/17G
17 POWDER, FOR SOLUTION ORAL DAILY
Status: DISCONTINUED | OUTPATIENT
Start: 2021-04-12 | End: 2021-04-14 | Stop reason: HOSPADM

## 2021-04-12 RX ORDER — DEXAMETHASONE SODIUM PHOSPHATE 10 MG/ML
INJECTION INTRAMUSCULAR; INTRAVENOUS AS NEEDED
Status: DISCONTINUED | OUTPATIENT
Start: 2021-04-12 | End: 2021-04-12 | Stop reason: SURG

## 2021-04-12 RX ORDER — OXYCODONE HYDROCHLORIDE AND ACETAMINOPHEN 5; 325 MG/1; MG/1
2 TABLET ORAL EVERY 4 HOURS PRN
Status: DISCONTINUED | OUTPATIENT
Start: 2021-04-12 | End: 2021-04-14 | Stop reason: HOSPADM

## 2021-04-12 RX ORDER — FENTANYL CITRATE 50 UG/ML
INJECTION, SOLUTION INTRAMUSCULAR; INTRAVENOUS AS NEEDED
Status: DISCONTINUED | OUTPATIENT
Start: 2021-04-12 | End: 2021-04-12 | Stop reason: SURG

## 2021-04-12 RX ORDER — CYCLOBENZAPRINE HCL 10 MG
10 TABLET ORAL 3 TIMES DAILY PRN
Status: DISCONTINUED | OUTPATIENT
Start: 2021-04-12 | End: 2021-04-14 | Stop reason: HOSPADM

## 2021-04-12 RX ORDER — ESMOLOL HYDROCHLORIDE 10 MG/ML
INJECTION INTRAVENOUS AS NEEDED
Status: DISCONTINUED | OUTPATIENT
Start: 2021-04-12 | End: 2021-04-12 | Stop reason: SURG

## 2021-04-12 RX ORDER — TRAZODONE HYDROCHLORIDE 100 MG/1
100 TABLET ORAL NIGHTLY
Status: DISCONTINUED | OUTPATIENT
Start: 2021-04-12 | End: 2021-04-14 | Stop reason: HOSPADM

## 2021-04-12 RX ORDER — SODIUM CHLORIDE 0.9 % (FLUSH) 0.9 %
3 SYRINGE (ML) INJECTION EVERY 12 HOURS SCHEDULED
Status: DISCONTINUED | OUTPATIENT
Start: 2021-04-12 | End: 2021-04-12 | Stop reason: HOSPADM

## 2021-04-12 RX ORDER — HYDROMORPHONE HCL IN 0.9% NACL 10 MG/50ML
PATIENT CONTROLLED ANALGESIA SYRINGE INTRAVENOUS CONTINUOUS
Status: DISCONTINUED | OUTPATIENT
Start: 2021-04-12 | End: 2021-04-14 | Stop reason: HOSPADM

## 2021-04-12 RX ORDER — SODIUM CHLORIDE, SODIUM LACTATE, POTASSIUM CHLORIDE, CALCIUM CHLORIDE 600; 310; 30; 20 MG/100ML; MG/100ML; MG/100ML; MG/100ML
9 INJECTION, SOLUTION INTRAVENOUS CONTINUOUS
Status: DISCONTINUED | OUTPATIENT
Start: 2021-04-12 | End: 2021-04-14 | Stop reason: HOSPADM

## 2021-04-12 RX ORDER — FLUMAZENIL 0.1 MG/ML
0.2 INJECTION INTRAVENOUS AS NEEDED
Status: DISCONTINUED | OUTPATIENT
Start: 2021-04-12 | End: 2021-04-12 | Stop reason: HOSPADM

## 2021-04-12 RX ORDER — PANTOPRAZOLE SODIUM 40 MG/1
40 TABLET, DELAYED RELEASE ORAL
Status: DISCONTINUED | OUTPATIENT
Start: 2021-04-12 | End: 2021-04-14 | Stop reason: HOSPADM

## 2021-04-12 RX ORDER — ONDANSETRON 2 MG/ML
4 INJECTION INTRAMUSCULAR; INTRAVENOUS ONCE AS NEEDED
Status: DISCONTINUED | OUTPATIENT
Start: 2021-04-12 | End: 2021-04-12 | Stop reason: HOSPADM

## 2021-04-12 RX ORDER — FAMOTIDINE 10 MG/ML
20 INJECTION, SOLUTION INTRAVENOUS ONCE
Status: COMPLETED | OUTPATIENT
Start: 2021-04-12 | End: 2021-04-12

## 2021-04-12 RX ORDER — OXYCODONE AND ACETAMINOPHEN 7.5; 325 MG/1; MG/1
1 TABLET ORAL ONCE AS NEEDED
Status: COMPLETED | OUTPATIENT
Start: 2021-04-12 | End: 2021-04-12

## 2021-04-12 RX ORDER — HYDROMORPHONE HYDROCHLORIDE 1 MG/ML
0.5 INJECTION, SOLUTION INTRAMUSCULAR; INTRAVENOUS; SUBCUTANEOUS
Status: DISCONTINUED | OUTPATIENT
Start: 2021-04-12 | End: 2021-04-12 | Stop reason: HOSPADM

## 2021-04-12 RX ORDER — ACETAMINOPHEN 325 MG/1
650 TABLET ORAL EVERY 4 HOURS PRN
Status: DISCONTINUED | OUTPATIENT
Start: 2021-04-12 | End: 2021-04-14 | Stop reason: HOSPADM

## 2021-04-12 RX ORDER — ONDANSETRON 4 MG/1
4 TABLET, FILM COATED ORAL EVERY 6 HOURS PRN
Status: DISCONTINUED | OUTPATIENT
Start: 2021-04-12 | End: 2021-04-14 | Stop reason: HOSPADM

## 2021-04-12 RX ORDER — MIDAZOLAM HYDROCHLORIDE 1 MG/ML
1 INJECTION INTRAMUSCULAR; INTRAVENOUS
Status: DISCONTINUED | OUTPATIENT
Start: 2021-04-12 | End: 2021-04-12 | Stop reason: HOSPADM

## 2021-04-12 RX ORDER — LIDOCAINE HYDROCHLORIDE 10 MG/ML
0.5 INJECTION, SOLUTION EPIDURAL; INFILTRATION; INTRACAUDAL; PERINEURAL ONCE AS NEEDED
Status: DISCONTINUED | OUTPATIENT
Start: 2021-04-12 | End: 2021-04-12 | Stop reason: HOSPADM

## 2021-04-12 RX ORDER — LIDOCAINE HYDROCHLORIDE 20 MG/ML
INJECTION, SOLUTION INFILTRATION; PERINEURAL AS NEEDED
Status: DISCONTINUED | OUTPATIENT
Start: 2021-04-12 | End: 2021-04-12 | Stop reason: SURG

## 2021-04-12 RX ADMIN — TRAZODONE HYDROCHLORIDE 100 MG: 100 TABLET ORAL at 20:30

## 2021-04-12 RX ADMIN — PROPOFOL 100 MG: 10 INJECTION, EMULSION INTRAVENOUS at 12:46

## 2021-04-12 RX ADMIN — FAMOTIDINE 20 MG: 10 INJECTION INTRAVENOUS at 09:43

## 2021-04-12 RX ADMIN — GLYCOPYRROLATE 0.4 MG: 0.2 INJECTION INTRAMUSCULAR; INTRAVENOUS at 14:05

## 2021-04-12 RX ADMIN — PROPOFOL 100 MG: 10 INJECTION, EMULSION INTRAVENOUS at 12:52

## 2021-04-12 RX ADMIN — PANTOPRAZOLE SODIUM 40 MG: 40 TABLET, DELAYED RELEASE ORAL at 18:20

## 2021-04-12 RX ADMIN — FENTANYL CITRATE 100 MCG: 50 INJECTION INTRAMUSCULAR; INTRAVENOUS at 13:03

## 2021-04-12 RX ADMIN — HYDROMORPHONE HYDROCHLORIDE: 10 INJECTION, SOLUTION INTRAMUSCULAR; INTRAVENOUS; SUBCUTANEOUS at 14:39

## 2021-04-12 RX ADMIN — LOSARTAN POTASSIUM 25 MG: 25 TABLET, FILM COATED ORAL at 20:28

## 2021-04-12 RX ADMIN — LIDOCAINE HYDROCHLORIDE 80 MG: 20 INJECTION, SOLUTION INFILTRATION; PERINEURAL at 12:29

## 2021-04-12 RX ADMIN — DOCUSATE SODIUM 100 MG: 100 CAPSULE, LIQUID FILLED ORAL at 20:27

## 2021-04-12 RX ADMIN — GLYCOPYRROLATE 0.2 MG: 0.2 INJECTION INTRAMUSCULAR; INTRAVENOUS at 12:28

## 2021-04-12 RX ADMIN — NEOSTIGMINE METHYLSULFATE 2 MG: 0.5 INJECTION INTRAVENOUS at 14:05

## 2021-04-12 RX ADMIN — LIDOCAINE HYDROCHLORIDE 2 MG/MIN: 4 INJECTION, SOLUTION INTRAVENOUS at 13:00

## 2021-04-12 RX ADMIN — MIDAZOLAM 2 MG: 1 INJECTION INTRAMUSCULAR; INTRAVENOUS at 13:30

## 2021-04-12 RX ADMIN — SODIUM CHLORIDE 100 ML/HR: 9 INJECTION, SOLUTION INTRAVENOUS at 18:20

## 2021-04-12 RX ADMIN — ESMOLOL HYDROCHLORIDE 30 MG: 100 INJECTION, SOLUTION INTRAVENOUS at 14:26

## 2021-04-12 RX ADMIN — PHENYLEPHRINE HYDROCHLORIDE 100 MCG: 10 INJECTION INTRAVENOUS at 13:18

## 2021-04-12 RX ADMIN — FLUTICASONE PROPIONATE 2 SPRAY: 50 SPRAY, METERED NASAL at 20:38

## 2021-04-12 RX ADMIN — POLYETHYLENE GLYCOL 3350 17 G: 17 POWDER, FOR SOLUTION ORAL at 20:29

## 2021-04-12 RX ADMIN — CEFAZOLIN SODIUM 2 G: 2 INJECTION, SOLUTION INTRAVENOUS at 20:27

## 2021-04-12 RX ADMIN — DOCUSATE SODIUM 50MG AND SENNOSIDES 8.6MG 1 TABLET: 8.6; 5 TABLET, FILM COATED ORAL at 20:27

## 2021-04-12 RX ADMIN — SODIUM CHLORIDE, PRESERVATIVE FREE 3 ML: 5 INJECTION INTRAVENOUS at 20:43

## 2021-04-12 RX ADMIN — MIDAZOLAM 2 MG: 1 INJECTION INTRAMUSCULAR; INTRAVENOUS at 12:55

## 2021-04-12 RX ADMIN — PHENYLEPHRINE HYDROCHLORIDE 100 MCG: 10 INJECTION INTRAVENOUS at 13:22

## 2021-04-12 RX ADMIN — FENTANYL CITRATE 50 MCG: 50 INJECTION INTRAMUSCULAR; INTRAVENOUS at 14:31

## 2021-04-12 RX ADMIN — ROCURONIUM BROMIDE 30 MG: 50 INJECTION INTRAVENOUS at 12:32

## 2021-04-12 RX ADMIN — CEFAZOLIN SODIUM 2 G: 2 INJECTION, SOLUTION INTRAVENOUS at 12:22

## 2021-04-12 RX ADMIN — ONDANSETRON 4 MG: 2 INJECTION INTRAMUSCULAR; INTRAVENOUS at 14:01

## 2021-04-12 RX ADMIN — PROPOFOL 170 MG: 10 INJECTION, EMULSION INTRAVENOUS at 12:31

## 2021-04-12 RX ADMIN — OXYCODONE HYDROCHLORIDE AND ACETAMINOPHEN 1 TABLET: 7.5; 325 TABLET ORAL at 15:04

## 2021-04-12 RX ADMIN — PROPOFOL 30 MG: 10 INJECTION, EMULSION INTRAVENOUS at 12:36

## 2021-04-12 RX ADMIN — DEXAMETHASONE SODIUM PHOSPHATE 8 MG: 10 INJECTION INTRAMUSCULAR; INTRAVENOUS at 12:59

## 2021-04-12 RX ADMIN — FENTANYL CITRATE 50 MCG: 50 INJECTION INTRAMUSCULAR; INTRAVENOUS at 14:38

## 2021-04-12 RX ADMIN — SODIUM CHLORIDE, POTASSIUM CHLORIDE, SODIUM LACTATE AND CALCIUM CHLORIDE 9 ML/HR: 600; 310; 30; 20 INJECTION, SOLUTION INTRAVENOUS at 09:13

## 2021-04-12 RX ADMIN — ESTROGENS, CONJUGATED 0.3 MG: 0.3 TABLET, FILM COATED ORAL at 20:29

## 2021-04-12 RX ADMIN — CYCLOBENZAPRINE 10 MG: 10 TABLET, FILM COATED ORAL at 14:53

## 2021-04-12 NOTE — OP NOTE
Operative note    Preoperative diagnosis: L4-5 spondylolisthesis with spinal stenosis    Postoperative diagnosis:  same    Procedure: L4-5 laminectomy and fusion with local bone graft, right iliac marrow aspiration and [place] fix bone graft substitute    Surgeon:  Raul Cantu Jr. MD    Asst.:  Korina Hernandez    Anesthetic: Gen.    EBL: 100 cc    Condition: Satisfactory    Description of procedure: She was anesthetized and positioned prone back was prepped and draped in sterile fashion.  Incision was made down to lumbodorsal fascia subperiosteal elevation was carried out the tips of transverse processes 4 and 5.  A marker confirmed the anatomic level.  I remove the interspinous ligament at 3 4 and 4 5 and excised the L4 lamina out to the medial facets and left about 8 mm of pars interarticularis on either side.  Medial facetectomies and foraminotomies were performed above and below the 4 5 level.  Some scarring from prior cyst and longstanding stenosis was present but upon completion hemostasis was assured, no dural trauma was noted and the roots were free within the foramina.  The wound was vigorously irrigated the graft bed was decorticated posterior laterally.  Pliafix mixed with local bone graft was placed in the lateral gutters bilaterally.  The wound was closed with running and interrupted #1 Vicryl for lumbodorsal fascia 2-0 Vicryl subcutaneously 4-0 Monocryl and Dermabond on the skin a sterile dressing was applied

## 2021-04-12 NOTE — ANESTHESIA PREPROCEDURE EVALUATION
Anesthesia Evaluation     NPO Solid Status: > 8 hours  NPO Liquid Status: > 2 hours           Airway   Mallampati: I  TM distance: >3 FB  Neck ROM: full  No difficulty expected  Dental    (+) edentulous    Pulmonary - negative pulmonary ROS and normal exam   Cardiovascular - normal exam    ECG reviewed    (+) hypertension less than 2 medications, past MI  >12 months, CHF Systolic <55%,     ROS comment: History of a stress CM, appearss to have resolved. Echo 2019: Left Ventricle Left ventricular systolic function is normal. Calculated EF = 52.0%. Estimated EF appears to be in the range of 56 - 60%. Normal left ventricular cavity size and wall thickness noted. All left ventricular wall segments contract normally.  Aortic Valve The valve appears trileaflet.  Greater Vessels No dilation of the aortic root is present.  Pericardium There is no evidence of pericardial effusion.    Cath 12/2018:  1. Normal pulmonary arteries  2. Normal coronary arteries  3. Mid anterior wall hypokinesis (similar to findings from cardiac cath in 6/2018)    Neuro/Psych  (+) numbness,     GI/Hepatic/Renal/Endo    (+)  GERD,      Musculoskeletal     (+) back pain, radiculopathy  Abdominal    Substance History - negative use     OB/GYN          Other   arthritis,                      Anesthesia Plan    ASA 3     general     intravenous induction     Anesthetic plan, all risks, benefits, and alternatives have been provided, discussed and informed consent has been obtained with: patient.

## 2021-04-12 NOTE — H&P
CC: Left leg pain     HPI: She has left leg pain, imbalance and low back pain from lumbar spinal stenosis.  I saw her a little more than a year ago when she underwent epidural injections the first of which worked immensely well but then they tapered off.  Her balance has deteriorated over time.  She has back and left buttock and leg pain.     PFSH: See attached.  She quit smoking 10 years ago and is very active on her property.     ROS: No bowel or bladder complaints     PE: On exam she has intact patellar reflexes and good strength in all tested groups in the lower extremities.  Sensation appears grossly intact and her gait appears balanced     XRAY: Plain film x-rays show grade one 9 mm spondylolisthesis at L4-5 and look pretty good otherwise.  No change from prior MRI.  MRI scan from 2 years ago demonstrates left-sided synovial cyst causing nerve compression and then moderate to severe spinal stenosis at L4-5 where the spondylolisthesis is located.     Other: n/a     Impression: Lumbar spinal stenosis from L4-5 spondylolisthesis and left L3-4 synovial cyst.     Plan: She is an excellent candidate for decompression and fusion.  If MRI is not significantly changed she would be ideal for L4-5 decompression and fusion with in situ bone, and then a left L3-4 medial facetectomy which would take care of the cyst.  If it is worse we may need to do more and if we fused 2 levels she would need instrumentation but I think a 1 level fusion would be fine with just local bone graft.  I discussed the risks and benefits of posterior spinal fusion, including where applicable, laminectomy.  Risks include adverse anesthetic events such as death, stroke and myocardial infarction.  More specific surgical risks include infection, nonunion, hardware failure, spinal fluid leakage, nerve root injury or paralysis, visceral or vascular injury, persistent pain, deep venous thrombosis, and pulmonary embolism among others. There is a 70 to 90  % chance of success.   Alternatives have been discussed.  She will be thinking of her options and will get a new MRI meantime I will call her with results and final recommendations.    I checked her MRI.  The previous synovial cyst was at L5-S1 and has essentially resorbed.  There is no significant stenosis there.  She does have some left L3-4 foraminal stenosis but that does not match her buttock and leg pain which is more L5 dermatome.  I plan L4-5 laminectomy and fusion with local bone graft.

## 2021-04-12 NOTE — ANESTHESIA POSTPROCEDURE EVALUATION
"Patient: Lila Pabon    Procedure Summary     Date: 04/12/21 Room / Location: [facility] OR  / [facility] MAIN OR    Anesthesia Start: 1226 Anesthesia Stop: 1438    Procedure: Lumbar 4-lumbar 5 laminectomy and fusion with local bone graft (N/A Spine Lumbar) Diagnosis:       Spinal stenosis, lumbar region with neurogenic claudication      (Spinal stenosis, lumbar region with neurogenic claudication [M48.062])    Surgeons: Raul Cantu MD Provider: Jesu Rueda MD    Anesthesia Type: general ASA Status: 3          Anesthesia Type: general    Vitals  Vitals Value Taken Time   /118 04/12/21 1533   Temp 36.3 °C (97.3 °F) 04/12/21 1434   Pulse 67 04/12/21 1542   Resp 16 04/12/21 1532   SpO2 97 % 04/12/21 1545   Vitals shown include unvalidated device data.        Post Anesthesia Care and Evaluation    Patient location during evaluation: bedside  Patient participation: complete - patient participated  Level of consciousness: awake and alert  Pain management: adequate  Airway patency: patent  Anesthetic complications: No anesthetic complications  PONV Status: none  Cardiovascular status: acceptable and hemodynamically stable  Respiratory status: acceptable and spontaneous ventilation  Hydration status: acceptable    Comments: BP (!) 154/118   Pulse 56   Temp 36.3 °C (97.3 °F) (Oral)   Resp 16   Ht 163.8 cm (64.5\")   Wt 57.5 kg (126 lb 12.8 oz)   LMP  (LMP Unknown)   SpO2 100%   BMI 21.43 kg/m²         "

## 2021-04-12 NOTE — ANESTHESIA PROCEDURE NOTES
Airway  Urgency: elective    Date/Time: 4/12/2021 12:34 PM  Airway not difficult    General Information and Staff    Patient location during procedure: OR  Anesthesiologist: Jesu Rueda MD    Indications and Patient Condition  Indications for airway management: airway protection    Preoxygenated: yes  Mask difficulty assessment: 1 - vent by mask    Final Airway Details  Final airway type: endotracheal airway      Successful airway: ETT  Cuffed: yes   Successful intubation technique: direct laryngoscopy  Endotracheal tube insertion site: oral  Blade: Calderon  Blade size: 2  ETT size (mm): 7.0  Cormack-Lehane Classification: grade I - full view of glottis  Placement verified by: chest auscultation and capnometry   Measured from: teeth  ETT/EBT  to teeth (cm): 19  Number of attempts at approach: 1  Assessment: lips, teeth, and gum same as pre-op and atraumatic intubation    Additional Comments  Pre oxygenated  Easy mask vent  Atraumatic intubation  + etco2  Breath sounds equal bilaterally

## 2021-04-13 LAB
ANION GAP SERPL CALCULATED.3IONS-SCNC: 8.2 MMOL/L (ref 5–15)
BUN SERPL-MCNC: 14 MG/DL (ref 8–23)
BUN/CREAT SERPL: 14.9 (ref 7–25)
CALCIUM SPEC-SCNC: 8.2 MG/DL (ref 8.6–10.5)
CHLORIDE SERPL-SCNC: 103 MMOL/L (ref 98–107)
CO2 SERPL-SCNC: 26.8 MMOL/L (ref 22–29)
CREAT SERPL-MCNC: 0.94 MG/DL (ref 0.57–1)
GFR SERPL CREATININE-BSD FRML MDRD: 59 ML/MIN/1.73
GLUCOSE SERPL-MCNC: 127 MG/DL (ref 65–99)
HCT VFR BLD AUTO: 29.1 % (ref 34–46.6)
HGB BLD-MCNC: 10.2 G/DL (ref 12–15.9)
POTASSIUM SERPL-SCNC: 4.3 MMOL/L (ref 3.5–5.2)
SODIUM SERPL-SCNC: 138 MMOL/L (ref 136–145)

## 2021-04-13 PROCEDURE — 25010000003 CEFAZOLIN IN DEXTROSE 2-4 GM/100ML-% SOLUTION: Performed by: ORTHOPAEDIC SURGERY

## 2021-04-13 PROCEDURE — 85018 HEMOGLOBIN: CPT | Performed by: ORTHOPAEDIC SURGERY

## 2021-04-13 PROCEDURE — 97162 PT EVAL MOD COMPLEX 30 MIN: CPT

## 2021-04-13 PROCEDURE — 80048 BASIC METABOLIC PNL TOTAL CA: CPT | Performed by: ORTHOPAEDIC SURGERY

## 2021-04-13 PROCEDURE — 85014 HEMATOCRIT: CPT | Performed by: ORTHOPAEDIC SURGERY

## 2021-04-13 PROCEDURE — 99024 POSTOP FOLLOW-UP VISIT: CPT | Performed by: ORTHOPAEDIC SURGERY

## 2021-04-13 RX ADMIN — PANTOPRAZOLE SODIUM 40 MG: 40 TABLET, DELAYED RELEASE ORAL at 09:00

## 2021-04-13 RX ADMIN — POLYETHYLENE GLYCOL 3350 17 G: 17 POWDER, FOR SOLUTION ORAL at 09:00

## 2021-04-13 RX ADMIN — FERROUS SULFATE TAB 325 MG (65 MG ELEMENTAL FE) 325 MG: 325 (65 FE) TAB at 09:00

## 2021-04-13 RX ADMIN — LOSARTAN POTASSIUM 25 MG: 25 TABLET, FILM COATED ORAL at 20:13

## 2021-04-13 RX ADMIN — DOCUSATE SODIUM 50MG AND SENNOSIDES 8.6MG 1 TABLET: 8.6; 5 TABLET, FILM COATED ORAL at 20:13

## 2021-04-13 RX ADMIN — OXYCODONE HYDROCHLORIDE AND ACETAMINOPHEN 2 TABLET: 5; 325 TABLET ORAL at 22:31

## 2021-04-13 RX ADMIN — OXYCODONE HYDROCHLORIDE AND ACETAMINOPHEN 2 TABLET: 5; 325 TABLET ORAL at 13:03

## 2021-04-13 RX ADMIN — ESTROGENS, CONJUGATED 0.3 MG: 0.3 TABLET, FILM COATED ORAL at 20:13

## 2021-04-13 RX ADMIN — SODIUM CHLORIDE, PRESERVATIVE FREE 3 ML: 5 INJECTION INTRAVENOUS at 09:00

## 2021-04-13 RX ADMIN — PANTOPRAZOLE SODIUM 40 MG: 40 TABLET, DELAYED RELEASE ORAL at 18:15

## 2021-04-13 RX ADMIN — OXYCODONE HYDROCHLORIDE AND ACETAMINOPHEN 2 TABLET: 5; 325 TABLET ORAL at 09:00

## 2021-04-13 RX ADMIN — CEFAZOLIN SODIUM 2 G: 2 INJECTION, SOLUTION INTRAVENOUS at 04:03

## 2021-04-13 RX ADMIN — FLUTICASONE PROPIONATE 2 SPRAY: 50 SPRAY, METERED NASAL at 20:13

## 2021-04-13 RX ADMIN — DOCUSATE SODIUM 100 MG: 100 CAPSULE, LIQUID FILLED ORAL at 20:13

## 2021-04-13 RX ADMIN — DOCUSATE SODIUM 50MG AND SENNOSIDES 8.6MG 1 TABLET: 8.6; 5 TABLET, FILM COATED ORAL at 09:00

## 2021-04-13 RX ADMIN — OXYCODONE HYDROCHLORIDE AND ACETAMINOPHEN 2 TABLET: 5; 325 TABLET ORAL at 01:58

## 2021-04-13 RX ADMIN — TRAZODONE HYDROCHLORIDE 100 MG: 100 TABLET ORAL at 20:13

## 2021-04-13 RX ADMIN — OXYCODONE HYDROCHLORIDE AND ACETAMINOPHEN 2 TABLET: 5; 325 TABLET ORAL at 18:15

## 2021-04-13 NOTE — THERAPY EVALUATION
Patient Name: Lila Pabon  : 1955    MRN: [id number]                              Today's Date: 2021       Admit Date: 2021    Visit Dx:     ICD-10-CM ICD-9-CM   1. Spinal stenosis, lumbar region with neurogenic claudication  M48.062 724.03     Patient Active Problem List   Diagnosis   • Esophagitis   • Stress-induced cardiomyopathy   • Type 2 myocardial infarction (CMS/HCC)   • Gastroesophageal reflux disease with esophagitis   • Adenomatous polyp of colon   • Personal history of colonic polyps   • Candidal esophagitis (CMS/HCC)   • Iron deficiency anemia due to chronic blood loss   • Spinal stenosis, lumbar region, with neurogenic claudication   • Spinal stenosis, lumbar region with neurogenic claudication     Past Medical History:   Diagnosis Date   • Anemia     IRON SUPPLEMENTS   • Arthritis    • Colon polyp    • Constipation    • DDD (degenerative disc disease), lumbar    • Environmental allergies    • GERD (gastroesophageal reflux disease)    • Hypertension    • Insomnia    • Lumbar disc herniation    • Lumbar stenosis    • Numbness and tingling of left lower extremity    • Poor balance    • Stress-induced cardiomyopathy 1/15/2019   • Type 2 myocardial infarction (CMS/HCC) 1/15/2019     Past Surgical History:   Procedure Laterality Date   • CARDIAC CATHETERIZATION N/A 2018    Procedure: Left Heart Cath and Cors;  Surgeon: Suhas Almonte MD;  Location: [facility] CATH INVASIVE LOCATION;  Service: Cardiovascular   • CARDIAC CATHETERIZATION N/A 2018    Procedure: Left ventriculography;  Surgeon: Suhas Almonte MD;  [facility];  Service: Cardiovascular   • CARDIAC CATHETERIZATION N/A 12/10/2018    Procedure: Right and Left Heart Cath;  Surgeon: Laquita Jessica MD;  [facility];  Service: Cardiology   • CARDIAC CATHETERIZATION N/A 12/10/2018    Procedure: Coronary angiography;  Surgeon: Laquita Jessica MD;  [facility];  Service: Cardiology   • CARDIAC CATHETERIZATION N/A 12/10/2018    Procedure: Left ventriculography;  Surgeon: Laquita Jessica MD;  Location: [facility] CATH INVASIVE LOCATION;  Service: Cardiology   • CATARACT EXTRACTION Bilateral    • CATARACT EXTRACTION     • COLONOSCOPY     • ENDOSCOPY N/A 6/27/2018    Procedure: ESOPHAGOGASTRODUODENOSCOPY with biopsies;  Surgeon: Rupa Hays MD;  [facility];  Service: Gastroenterology   • ENDOSCOPY N/A 9/18/2018    Procedure: ESOPHAGOGASTRODUODENOSCOPY with biopsies;  Surgeon: Rupa Hays MD;  [facility] ENDOS[facility];  Service: Gastroenterology   • ENDOSCOPY N/A 12/7/2018    Procedure: ESOPHAGOGASTRODUODENOSCOPY JWITH BIOPSIES;  Surgeon: Laureano Rehman MD;  Location: [facility] OR;  Service: Gastroenterology   • EPIDURAL BLOCK     • GLAUCOMA SURGERY     • HYSTERECTOMY     • LUMBAR FUSION N/A 4/12/2021    Procedure: Lumbar 4-lumbar 5 laminectomy and fusion with local bone graft;  Surgeon: Raul Cantu MD;  Location: [facility] MAIN OR;  Service: Orthopedic Spine;  Laterality: N/A;   • SHOULDER SURGERY Right     RUPTURED BICEP TENDON     General Information     Row Name 04/13/21 1440          Physical Therapy Time and Intention    Document Type  evaluation  -SR     Mode of Treatment  physical therapy;individual therapy  -SR     Row Name 04/13/21 1440          General Information    Patient Profile Reviewed  yes  -SR     Prior Level of Function  independent:;ADL's;community mobility;gait  -SR     Existing Precautions/Restrictions  fall;spinal;brace worn when out of bed  -SR     Barriers to Rehab  none identified  -SR     Row Name 04/13/21 1440          Living Environment    Lives With  spouse  -SR     Row Name 04/13/21 1440          Home Main Entrance    Number of Stairs, Main Entrance  one  -SR     Stair Railings, Main Entrance  none  -SR     Row Name 04/13/21 1440          Stairs Within Home, Primary    Stairs,  Within Home, Primary  lives on main level  -SR     Row Name 04/13/21 1440          Cognition    Orientation Status (Cognition)  oriented x 3  -SR     Row Name 04/13/21 1440          Safety Issues, Functional Mobility    Impairments Affecting Function (Mobility)  range of motion (ROM);strength;endurance/activity tolerance  -SR     Comment, Safety Issues/Impairments (Mobility)  gait belt and brace worn OOB  -SR       User Key  (r) = Recorded By, (t) = Taken By, (c) = Cosigned By    Initials Name Provider Type    SR Rupa Gardner Physical Therapist        Mobility     Row Name 04/13/21 1441          Bed Mobility    Bed Mobility  sit-sidelying  -SR     Sit-Sidelying [place] (Bed Mobility)  minimum assist (75% patient effort);1 person assist;verbal cues  -SR     Comment (Bed Mobility)  educated on log roll for bed mobility  -SR     Row Name 04/13/21 1441          Transfers    Comment (Transfers)  toilet transfer with RW SBA, sit <> stand with RW CGA with cues for hand placement  -SR     Row Name 04/13/21 1441          Sit-Stand Transfer    Sit-Stand [place] (Transfers)  contact guard;1 person assist;verbal cues  -SR     Assistive Device (Sit-Stand Transfers)  walker, front-wheeled  -SR     Row Name 04/13/21 1441          Gait/Stairs (Locomotion)    [place] Level (Gait)  contact guard;1 person assist;verbal cues  -SR     Assistive Device (Gait)  walker, front-wheeled  -SR     Distance in Feet (Gait)  2x 80 ft  -SR     Deviations/Abnormal Patterns (Gait)  festinating/shuffling;surendra decreased;gait speed decreased  -SR     Bilateral Gait Deviations  heel strike decreased  -SR     Number of Steps (Stairs)  2x 1 step  -SR     Ascending Technique (Stairs)  step-to-step  -SR     Descending Technique (Stairs)  step-to-step  -SR     Comment (Gait/Stairs)  pt required cues for safe use of RW, pt ascended/descended 1 step 2x alternating foot first with CGA, no LOB, distance limited by activity tolerance  -SR        User Key  (r) = Recorded By, (t) = Taken By, (c) = Cosigned By    Initials Name Provider Type    SR Rupa Gardner Physical Therapist        Obj/Interventions     Row Name 04/13/21 1444          Range of Motion Comprehensive    General Range of Motion  bilateral lower extremity ROM WFL  -SR     Comment, General Range of Motion  lumbar ROM limited by spinal precautions  -SR     Row Name 04/13/21 1444          Strength Comprehensive (MMT)    General Manual Muscle Testing (MMT) Assessment  lower extremity strength deficits identified  -SR     Comment, General Manual Muscle Testing (MMT) Assessment  grossly 3+/5 BLE functionally tested  -SR     Row Name 04/13/21 1444          Balance    Balance Assessment  sitting static balance;sitting dynamic balance;standing static balance;standing dynamic balance  -SR     Static Sitting Balance  WFL;sitting, edge of bed  -SR     Dynamic Sitting Balance  WFL;sitting, edge of bed  -SR     Static Standing Balance  mild impairment;supported  -SR     Dynamic Standing Balance  mild impairment;supported  -SR     Comment, Balance  CGA with RW for standing balance  -SR     Row Name 04/13/21 1444          Sensory Assessment (Somatosensory)    Sensory Assessment (Somatosensory)  sensation intact reports tingling in B toes  -SR       User Key  (r) = Recorded By, (t) = Taken By, (c) = Cosigned By    Initials Name Provider Type    SR Rupa Gardner Physical Therapist        Goals/Plan     Row Name 04/13/21 1447          Bed Mobility Goal 1 (PT)    Activity/Assistive Device (Bed Mobility Goal 1, PT)  bed mobility activities, all  -SR     [place] Level/Cues Needed (Bed Mobility Goal 1, PT)  independent  -SR     Time Frame (Bed Mobility Goal 1, PT)  1 week  -SR     Row Name 04/13/21 1447          Transfer Goal 1 (PT)    Activity/Assistive Device (Transfer Goal 1, PT)  transfers, all  -SR     [place] Level/Cues Needed (Transfer Goal 1, PT)  independent  -SR     Time Frame (Transfer Goal 1,  PT)  1 week  -SR     Row Name 04/13/21 1447          Gait Training Goal 1 (PT)    Activity/Assistive Device (Gait Training Goal 1, PT)  gait (walking locomotion)  -SR     [place] Level (Gait Training Goal 1, PT)  standby assist  -SR     Distance (Gait Training Goal 1, PT)  150ft  -SR     Time Frame (Gait Training Goal 1, PT)  1 week  -SR     Row Name 04/13/21 1447          Patient Education Goal (PT)    Activity (Patient Education Goal, PT)  understand spinal precautions  -SR     [place]/Cues/Accuracy (Memory Goal 2, PT)  independent  -SR     Time Frame (Patient Education Goal, PT)  1 week  -SR       User Key  (r) = Recorded By, (t) = Taken By, (c) = Cosigned By    Initials Name Provider Type    SR Rupa Gardner Physical Therapist        Clinical Impression     Row Name 04/13/21 1442          Pain    Additional Documentation  Pain Scale: FACES Pre/Post-Treatment (Group)  -SR     Row Name 04/13/21 1449          Pain Scale: Numbers Pre/Post-Treatment    Pain Intervention(s)  Ambulation/increased activity;Repositioned  -SR     Row Name 04/13/21 1444          Pain Scale: FACES Pre/Post-Treatment    Pain: FACES Scale, Pretreatment  2-->hurts little bit  -SR     Posttreatment Pain Rating  2-->hurts little bit  -SR     Pain Location - Orientation  lower  -SR     Pain Location  back  -SR     Pre/Posttreatment Pain Comment  brace worn OOB  -SR     Row Name 04/13/21 7594          Plan of Care Review    Plan of Care Reviewed With  patient;spouse  -SR     Outcome Summary  Pt is a 64 yo female admitted 4/12/21 with spinal stenosis, lumbar region with neurogenic claudication. S/p L4-L5 laminectomy and fusion 4/12/21. Pt reports living in home with 1 ALEXANDER with spouse and was IADLs no ADs used prior to hospitalization. Pt currently ambulates 80 ft with RW assist x 1 demonstrating impaired strength, motion, gait, and activity tolerance. Pt could benefit from skilled PT to address deficits to improve functional mobility. PT  recommends  PT to facilitate progress  -SR     Row Name 04/13/21 1445          Therapy Assessment/Plan (PT)    Rehab Potential (PT)  good, to achieve stated therapy goals  -SR     Criteria for Skilled Interventions Met (PT)  yes;meets criteria;skilled treatment is necessary  -SR     Predicted Duration of Therapy Intervention (PT)  1 week  -SR     Row Name 04/13/21 1445          Positioning and Restraints    Pre-Treatment Position  in bed  -SR     Post Treatment Position  bed  -SR     In Bed  supine;with family/caregiver;call light within reach;encouraged to call for assist;exit alarm on;SCD pump applied  -SR       User Key  (r) = Recorded By, (t) = Taken By, (c) = Cosigned By    Initials Name Provider Type    Rupa Chavis Physical Therapist        Outcome Measures     Row Name 04/13/21 1448          How much help from another person do you currently need...    Turning from your back to your side while in flat bed without using bedrails?  3  -SR     Moving from lying on back to sitting on the side of a flat bed without bedrails?  3  -SR     Moving to and from a bed to a chair (including a wheelchair)?  3  -SR     Standing up from a chair using your arms (e.g., wheelchair, bedside chair)?  3  -SR     Climbing 3-5 steps with a railing?  3  -SR     To walk in hospital room?  3  -SR     AM-PAC 6 Clicks Score (PT)  18  -SR     Row Name 04/13/21 1448          Functional Assessment    Outcome Measure Options  AM-PAC 6 Clicks Basic Mobility (PT)  -SR       User Key  (r) = Recorded By, (t) = Taken By, (c) = Cosigned By    Initials Name Provider Type    Rupa Chavis Physical Therapist        Physical Therapy Education                 Title: PT OT SLP Therapies (In Progress)     Topic: Physical Therapy (In Progress)     Point: Mobility training (Done)     Learning Progress Summary           Patient Acceptance, E, VU by SR at 4/13/2021 1449   Family Acceptance, E, VU by SR at 4/13/2021 1449                   Point:  Home exercise program (Not Started)     Learner Progress:  Not documented in this visit.          Point: Body mechanics (Done)     Learning Progress Summary           Patient Acceptance, E, VU by SR at 4/13/2021 1449   Family Acceptance, E, VU by SR at 4/13/2021 1449                   Point: Precautions (Done)     Learning Progress Summary           Patient Acceptance, E, VU by SR at 4/13/2021 1449   Family Acceptance, E, VU by SR at 4/13/2021 1449                               User Key     Initials Effective Dates Name Provider Type Discipline     02/08/21 -  Rupa Gardner Physical Therapist PT              PT Recommendation and Plan  Planned Therapy Interventions (PT): balance training, bed mobility training, gait training, home exercise program, manual therapy techniques, neuromuscular re-education, patient/family education, postural re-education, ROM (range of motion), stair training, strengthening, stretching, transfer training  Plan of Care Reviewed With: patient, spouse  Outcome Summary: Pt is a 64 yo female admitted 4/12/21 with spinal stenosis, lumbar region with neurogenic claudication. S/p L4-L5 laminectomy and fusion 4/12/21. Pt reports living in home with 1 ALEXANDER with spouse and was IADLs no ADs used prior to hospitalization. Pt currently ambulates 80 ft with RW assist x 1 demonstrating impaired strength, motion, gait, and activity tolerance. Pt could benefit from skilled PT to address deficits to improve functional mobility. PT recommends  PT to facilitate progress     Time Calculation:   PT Charges     Row Name 04/13/21 1449             Time Calculation    Start Time  1304  -SR      Stop Time  1324  -SR      Time Calculation (min)  20 min  -SR      PT Received On  04/13/21  -SR      PT - Next Appointment  04/14/21  -SR      PT Goal Re-Cert Due Date  04/20/21  -SR         Time Calculation- PT    Total Timed Code Minutes- PT  18 minute(s)  -SR        User Key  (r) = Recorded By, (t) = Taken By, (c) =  Cosigned By    Initials Name Provider Type    SR Rupa Gardner Physical Therapist        Therapy Charges for Today     Code Description Service Date Service Provider Modifiers Qty    11787689671 HC PT EVAL MOD COMPLEXITY 2 4/13/2021 Rupa Gardner GP 1          PT G-Codes  Outcome Measure Options: AM-PAC 6 Clicks Basic Mobility (PT)  AM-PAC 6 Clicks Score (PT): 18    Rupa Gardner  4/13/2021

## 2021-04-13 NOTE — PROGRESS NOTES
Orthopedic Progress Note      Patient: Lila Pabon    YOB: 1955    Medical Record Number: [id number]    Attending Physician: Raul Cantu MD    Date of Admission: 4/12/2021  8:50 AM    Admitting Dx:  Spinal stenosis, lumbar region with neurogenic claudication [M48.062]  Spinal stenosis, lumbar region, with neurogenic claudication [M48.062]    Status Post: Lumbar 4-lumbar 5 laminectomy and fusion with local bone graft    Post Operative Day Number: 1    Current Problem List:   Patient Active Problem List   Diagnosis    Esophagitis    Stress-induced cardiomyopathy    Type 2 myocardial infarction (CMS/HCC)    Gastroesophageal reflux disease with esophagitis    Adenomatous polyp of colon    Personal history of colonic polyps    Candidal esophagitis (CMS/HCC)    Iron deficiency anemia due to chronic blood loss    Spinal stenosis, lumbar region, with neurogenic claudication    Spinal stenosis, lumbar region with neurogenic claudication         Past Medical History:   Diagnosis Date    Anemia     IRON SUPPLEMENTS    Arthritis     Colon polyp     Constipation     DDD (degenerative disc disease), lumbar     Environmental allergies     GERD (gastroesophageal reflux disease)     Hypertension     Insomnia     Lumbar disc herniation     Lumbar stenosis     Numbness and tingling of left lower extremity     Poor balance     Stress-induced cardiomyopathy 1/15/2019    Type 2 myocardial infarction (CMS/HCC) 1/15/2019       Current Medications:  Scheduled Meds:docusate sodium, 100 mg, Oral, Nightly  estrogens (conjugated), 0.3 mg, Oral, Nightly  ferrous sulfate, 325 mg, Oral, Daily With Breakfast  fluticasone, 2 spray, Nasal, Nightly  losartan, 25 mg, Oral, Nightly  pantoprazole, 40 mg, Oral, BID AC  polyethylene glycol, 17 g, Oral, Daily  senna-docusate sodium, 1 tablet, Oral, BID  sodium chloride, 3 mL, Intravenous, Q12H  traZODone, 100 mg, Oral, Nightly      PRN Meds:.  acetaminophen    bisacodyl     cyclobenzaprine    magnesium hydroxide    naloxone    ondansetron **OR** ondansetron    oxyCODONE-acetaminophen    sodium chloride    sucralfate    temazepam    SUBJECTIVE: 65 y.o.  female awake and alert.  No complaints    OBJECTIVE:   Vitals:    04/12/21 2028 04/12/21 2251 04/13/21 0335 04/13/21 0746   BP: 118/73 115/67 97/61 93/57   BP Location:  Left arm Left arm Left arm   Patient Position:  Lying Lying Sitting   Pulse: 61 73 68 57   Resp:  16 16 16   Temp:  97.5 °F (36.4 °C) 97.3 °F (36.3 °C) 96.9 °F (36.1 °C)   TempSrc:  Skin Skin Skin   SpO2:  96% 98% 96%   Weight:       Height:         I/O last 3 completed shifts:  In: 2379 [P.O.:50; I.V.:2329]  Out: 1000 [Urine:1000]    Diagnostic Tests:   Lab Results (last 24 hours)       Procedure Component Value Units Date/Time    Basic Metabolic Panel [[id number]]  (Abnormal) Collected: 04/13/21 0518    Specimen: Blood from Arm, Left Updated: 04/13/21 0640     Glucose 127 mg/dL      BUN 14 mg/dL      Creatinine 0.94 mg/dL      Sodium 138 mmol/L      Potassium 4.3 mmol/L      Chloride 103 mmol/L      CO2 26.8 mmol/L      Calcium 8.2 mg/dL      eGFR Non African Amer 59 mL/min/1.73      BUN/Creatinine Ratio 14.9     Anion Gap 8.2 mmol/L     Narrative:      GFR Normal >60  Chronic Kidney Disease <60  Kidney Failure <15      Hemoglobin & Hematocrit, Blood [[id number]]  (Abnormal) Collected: 04/13/21 0518    Specimen: Blood from Arm, Left Updated: 04/13/21 0637     Hemoglobin 10.2 g/dL      Hematocrit 29.1 %     Hemoglobin & Hematocrit, Blood [[id number]]  (Normal) Collected: 04/12/21 1643    Specimen: Blood from Arm, Left Updated: 04/12/21 1719     Hemoglobin 12.6 g/dL      Hematocrit 36.8 %             PHYSICAL EXAM: Back dressing is dry and intact.  Moving legs well.  Does still have some slight tingling in her toes on both feet but improved as compared to preop.  Strength is equal bilaterally.  Preoperative leg pain is now resolved.  Abdomen is soft with bowel sounds.   Denies any nausea but not passing any gas.  Pain is well controlled with medication.  Hemoglobin is 10.2.  Patient is hypotensive however is asymptomatic when out of bed.  Have encouraged her to increase her p.o. fluid intake.  Urine output is adequate.     ASSESSMENT & PLAN:  ELEAZAR Bell  Mobilize patient with PT as tolerated.  Plan Home possibly tomorrow        Date: 4/13/2021    Smitha Sifuentes RN          \

## 2021-04-13 NOTE — PLAN OF CARE
Goal Outcome Evaluation:  Plan of Care Reviewed With: patient, spouse     Outcome Summary: Pt is a 64 yo female admitted 4/12/21 with spinal stenosis, lumbar region with neurogenic claudication. S/p L4-L5 laminectomy and fusion 4/12/21. Pt reports living in home with 1 ALEXANDER with spouse and was IADLs no ADs used prior to hospitalization. Pt currently ambulates 80 ft with RW assist x 1 demonstrating impaired strength, motion, gait, and activity tolerance. Pt could benefit from skilled PT to address deficits to improve functional mobility. PT recommends  PT to facilitate progress  Patient was intermittently wearing a face mask during this therapy encounter. Therapist used appropriate personal protective equipment including eye protection, mask, and gloves.  Mask used was standard procedure mask. Appropriate PPE was worn during the entire therapy session. Hand hygiene was completed before and after therapy session. Patient is not in enhanced droplet precautions.

## 2021-04-13 NOTE — PLAN OF CARE
Goal Outcome Evaluation:  Plan of Care Reviewed With: patient  Progress: improving  Outcome Summary: POD#1 OFL4-L5  LAMI AND FUSION WITH BONE GRAFT. DRESSING TO BACK DRY/ INTACT. VSS. PCA DILAUDID FOR PAIN. PATIENT AMBULATING WELL WITH 1 ASSIST. VOIDING PER BRP. EDUCATION PROVIDED ON BP MONITORING AND SAFETY. PATIENT VERBALIZED UNDERSTANDING.

## 2021-04-13 NOTE — PLAN OF CARE
Goal Outcome Evaluation:        Outcome Summary: Received PT from PACU. s/p L5-L5 laminectomy with fusion local bone graft, on PCA dilaudid, IV site C/D/I, able to tolerate snacks w/o N/V, in room air O2 96-97%, surgical site C/D/I, needs attended.

## 2021-04-13 NOTE — PLAN OF CARE
Goal Outcome Evaluation:  Plan of Care Reviewed With: patient  Progress: improving  Outcome Summary: Pt is 64 y/o female, pod 1 L4-5 lami with fusion. Dressing is cdi, vss, n/v intact. Pain controlled with po meds and IV PCA. Pt ambulating to bathroom. Possible d/c home tomorrow. Will continue to monitor bp r/t history of HTN.

## 2021-04-14 VITALS
HEART RATE: 77 BPM | TEMPERATURE: 96.9 F | BODY MASS INDEX: 21.13 KG/M2 | HEIGHT: 65 IN | SYSTOLIC BLOOD PRESSURE: 112 MMHG | RESPIRATION RATE: 16 BRPM | OXYGEN SATURATION: 94 % | WEIGHT: 126.8 LBS | DIASTOLIC BLOOD PRESSURE: 69 MMHG

## 2021-04-14 LAB
HCT VFR BLD AUTO: 27.8 % (ref 34–46.6)
HGB BLD-MCNC: 9.5 G/DL (ref 12–15.9)

## 2021-04-14 PROCEDURE — 85014 HEMATOCRIT: CPT | Performed by: ORTHOPAEDIC SURGERY

## 2021-04-14 PROCEDURE — 85018 HEMOGLOBIN: CPT | Performed by: ORTHOPAEDIC SURGERY

## 2021-04-14 PROCEDURE — 99024 POSTOP FOLLOW-UP VISIT: CPT | Performed by: ORTHOPAEDIC SURGERY

## 2021-04-14 RX ORDER — CYCLOBENZAPRINE HCL 10 MG
10 TABLET ORAL 3 TIMES DAILY PRN
Qty: 30 TABLET | Refills: 0 | Status: SHIPPED | OUTPATIENT
Start: 2021-04-14 | End: 2021-04-22 | Stop reason: SDUPTHER

## 2021-04-14 RX ORDER — OXYCODONE HYDROCHLORIDE AND ACETAMINOPHEN 5; 325 MG/1; MG/1
TABLET ORAL
Qty: 45 TABLET | Refills: 0 | Status: SHIPPED | OUTPATIENT
Start: 2021-04-14 | End: 2021-04-22 | Stop reason: SDUPTHER

## 2021-04-14 RX ADMIN — OXYCODONE HYDROCHLORIDE AND ACETAMINOPHEN 2 TABLET: 5; 325 TABLET ORAL at 10:45

## 2021-04-14 RX ADMIN — FERROUS SULFATE TAB 325 MG (65 MG ELEMENTAL FE) 325 MG: 325 (65 FE) TAB at 10:45

## 2021-04-14 RX ADMIN — PANTOPRAZOLE SODIUM 40 MG: 40 TABLET, DELAYED RELEASE ORAL at 10:46

## 2021-04-14 RX ADMIN — OXYCODONE HYDROCHLORIDE AND ACETAMINOPHEN 2 TABLET: 5; 325 TABLET ORAL at 03:16

## 2021-04-14 RX ADMIN — POLYETHYLENE GLYCOL 3350 17 G: 17 POWDER, FOR SOLUTION ORAL at 10:46

## 2021-04-14 RX ADMIN — DOCUSATE SODIUM 50MG AND SENNOSIDES 8.6MG 1 TABLET: 8.6; 5 TABLET, FILM COATED ORAL at 10:46

## 2021-04-14 NOTE — PROGRESS NOTES
Orthopedic Progress Note      Patient: Lila Pabon    YOB: 1955    Medical Record Number: [id number]    Attending Physician: Raul Cantu MD    Date of Admission: 4/12/2021  8:50 AM    Admitting Dx:  Spinal stenosis, lumbar region with neurogenic claudication [M48.062]  Spinal stenosis, lumbar region, with neurogenic claudication [M48.062]    Status Post: Lumbar 4-lumbar 5 laminectomy and fusion with local bone graft    Post Operative Day Number: 2    Current Problem List:   Patient Active Problem List   Diagnosis    Esophagitis    Stress-induced cardiomyopathy    Type 2 myocardial infarction (CMS/HCC)    Gastroesophageal reflux disease with esophagitis    Adenomatous polyp of colon    Personal history of colonic polyps    Candidal esophagitis (CMS/HCC)    Iron deficiency anemia due to chronic blood loss    Spinal stenosis, lumbar region, with neurogenic claudication    Spinal stenosis, lumbar region with neurogenic claudication   Acute on chronic blood loss anemia    Past Medical History:   Diagnosis Date    Anemia     IRON SUPPLEMENTS    Arthritis     Colon polyp     Constipation     DDD (degenerative disc disease), lumbar     Environmental allergies     GERD (gastroesophageal reflux disease)     Hypertension     Insomnia     Lumbar disc herniation     Lumbar stenosis     Numbness and tingling of left lower extremity     Poor balance     Stress-induced cardiomyopathy 1/15/2019    Type 2 myocardial infarction (CMS/HCC) 1/15/2019       Current Medications:  Scheduled Meds:docusate sodium, 100 mg, Oral, Nightly  estrogens (conjugated), 0.3 mg, Oral, Nightly  ferrous sulfate, 325 mg, Oral, Daily With Breakfast  fluticasone, 2 spray, Nasal, Nightly  losartan, 25 mg, Oral, Nightly  pantoprazole, 40 mg, Oral, BID AC  polyethylene glycol, 17 g, Oral, Daily  senna-docusate sodium, 1 tablet, Oral, BID  sodium chloride, 3 mL, Intravenous, Q12H  traZODone, 100 mg, Oral, Nightly      PRN Meds:.   acetaminophen    bisacodyl    cyclobenzaprine    magnesium hydroxide    naloxone    ondansetron **OR** ondansetron    oxyCODONE-acetaminophen    sodium chloride    sucralfate    temazepam    SUBJECTIVE: 65 y.o.  female awake and alert.  Complaints of incisional pain as expected     OBJECTIVE:   Vitals:    04/13/21 2013 04/13/21 2304 04/14/21 0308 04/14/21 0700   BP: 123/74 103/59  111/62   BP Location:  Left arm  Left arm   Patient Position:  Lying  Lying   Pulse: 83 70  74   Resp:  16 16 16   Temp:  98 °F (36.7 °C) 97.1 °F (36.2 °C) 97 °F (36.1 °C)   TempSrc:  Skin Skin Oral   SpO2:  96%  94%   Weight:       Height:         I/O last 3 completed shifts:  In: 1963.2 [P.O.:840; I.V.:1123.2]  Out: 4900 [Urine:4900]    Diagnostic Tests:   Lab Results (last 24 hours)       Procedure Component Value Units Date/Time    Hemoglobin & Hematocrit, Blood [[id number]]  (Abnormal) Collected: 04/14/21 0601    Specimen: Blood Updated: 04/14/21 0713     Hemoglobin 9.5 g/dL      Hematocrit 27.8 %             PHYSICAL EXAM: Back incision is intact without any erythema, drainage or swelling.  Mepilex dressing applied.  Moving legs well.  Denies any leg pain or numbness and tingling.  Strength is equal bilaterally.  Voiding well.  Abdomen is soft with bowel sounds.  Passing gas but no BM.  Pain is well controlled with oral medication.  Hemoglobin is 9.5.  Vital signs remained stable.     ASSESSMENT & PLAN:    Plan Home later today.  Patient is to return to the office in 2 weeks to see Dr. Cantu for follow-up        Date: 4/14/2021    Smitha Sifuentes RN

## 2021-04-14 NOTE — DISCHARGE SUMMARY
Orthopedic Discharge Summary      Patient: Lila Pabon  YOB: 1955  Medical Record Number: [id number]    Attending Physician: Raul Cantu MD  Consulting Physician(s):   Consults     No orders found from 3/14/2021 to 4/13/2021.          Date of Admission: 4/12/2021  8:50 AM  Date of Discharge:4/14/2021    Discharge Diagnosis: Lumbar 4-lumbar 5 laminectomy and fusion with local bone graft,   Acute Blood Loss Anemia      Presenting Problem/History of Present Illness: Spinal stenosis, lumbar region with neurogenic claudication [M48.062]  Spinal stenosis, lumbar region, with neurogenic claudication [M48.062]      Allergies:   Allergies   Allergen Reactions   • Mobic [Meloxicam] Rash       Discharge Medications     Discharge Medications      New Medications      Instructions Start Date   cyclobenzaprine 10 MG tablet  Commonly known as: FLEXERIL   10 mg, Oral, 3 Times Daily PRN      oxyCODONE-acetaminophen 5-325 MG per tablet  Commonly known as: PERCOCET   1 to 2 tablets p.o. every 4 to 6 hours as needed pain         Changes to Medications      Instructions Start Date   pantoprazole 40 MG EC tablet  Commonly known as: PROTONIX  What changed: when to take this   40 mg, Oral, 2 Times Daily Before Meals         Continue These Medications      Instructions Start Date   aspirin 81 MG EC tablet  Notes to patient: Next dose due: tomorrow   81 mg, Oral, Daily      Calcium 600+D 600-800 MG-UNIT tablet  Generic drug: calcium carb-cholecalciferol   1 tablet, Oral, Nightly      docusate sodium 100 MG capsule  Commonly known as: COLACE   100 mg, Oral, Nightly      estrogens (conjugated) 0.3 MG tablet  Commonly known as: PREMARIN   0.3 mg, Oral, Nightly, Take daily for 21 days then do not take for 7 days.      ferrous sulfate 140 (45 Fe) MG tablet controlled-release tablet   140 mg, Oral, Nightly      fluticasone 50 MCG/ACT nasal spray  Commonly known as: FLONASE   2 sprays, Nasal, Nightly      losartan 25 MG  tablet  Commonly known as: COZAAR   25 mg, Oral, Nightly      sucralfate 1 g tablet  Commonly known as: CARAFATE   1 g, Oral, 4 Times Daily Before Meals & Nightly PRN      traZODone 100 MG tablet  Commonly known as: DESYREL   100 mg, Oral, Nightly               Past Medical History:   Diagnosis Date   • Anemia     IRON SUPPLEMENTS   • Arthritis    • Colon polyp    • Constipation    • DDD (degenerative disc disease), lumbar    • Environmental allergies    • GERD (gastroesophageal reflux disease)    • Hypertension    • Insomnia    • Lumbar disc herniation    • Lumbar stenosis    • Numbness and tingling of left lower extremity    • Poor balance    • Stress-induced cardiomyopathy 1/15/2019   • Type 2 myocardial infarction (CMS/HCC) 1/15/2019        Past Surgical History:   Procedure Laterality Date   • CARDIAC CATHETERIZATION N/A 6/26/2018    Procedure: Left Heart Cath and Cors;  Surgeon: Suhas Almonte MD;  Location: [facility] CATH INVASIVE LOCATION;  Service: Cardiovascular   • CARDIAC CATHETERIZATION N/A 6/26/2018    Procedure: Left ventriculography;  Surgeon: Suhas Almonte MD;  Location: [facility] CATH INVASIVE LOCATION;  Service: Cardiovascular   • CARDIAC CATHETERIZATION N/A 12/10/2018    Procedure: Right and Left Heart Cath;  Surgeon: Laquita Jessica MD;  [facility];  Service: Cardiology   • CARDIAC CATHETERIZATION N/A 12/10/2018    Procedure: Coronary angiography;  Surgeon: Laquita Jessica MD;  Location: [facility] CATH INVASIVE LOCATION;  Service: Cardiology   • CARDIAC CATHETERIZATION N/A 12/10/2018    Procedure: Left ventriculography;  Surgeon: Laquita Jessica MD;  Location: [facility] CATH INVASIVE LOCATION;  Service: Cardiology   • CATARACT EXTRACTION Bilateral    • CATARACT EXTRACTION     • COLONOSCOPY     • ENDOSCOPY N/A 6/27/2018    Procedure: ESOPHAGOGASTRODUODENOSCOPY with biopsies;  Surgeon: Rupa Hays MD;  Location: [facility] ENDOSCOPY;  Service: Gastroenterology    • ENDOSCOPY N/A 9/18/2018    Procedure: ESOPHAGOGASTRODUODENOSCOPY with biopsies;  Surgeon: Rupa Hays MD;  Location: [facility] ENDOSCOPY;  Service: Gastroenterology   • ENDOSCOPY N/A 12/7/2018    Procedure: ESOPHAGOGASTRODUODENOSCOPY JWITH BIOPSIES;  Surgeon: Laureano Rehman MD;  Location:  [facility] OR;  Service: Gastroenterology   • EPIDURAL BLOCK     • GLAUCOMA SURGERY     • HYSTERECTOMY     • LUMBAR FUSION N/A 4/12/2021    Procedure: Lumbar 4-lumbar 5 laminectomy and fusion with local bone graft;  Surgeon: Raul Cantu MD;  Location: [facility] MAIN OR;  Service: Orthopedic Spine;  Laterality: N/A;   • SHOULDER SURGERY Right     RUPTURED BICEP TENDON        Social History     Occupational History   • Not on file   Tobacco Use   • Smoking status: Former Smoker     Packs/day: 3.00     Years: 44.00     Pack years: 132.00     Types: Cigarettes     Quit date: 12/20/2010     Years since quitting: 10.3   • Smokeless tobacco: Never Used   Vaping Use   • Vaping Use: Never used   Substance and Sexual Activity   • Alcohol use: Yes     Comment: Rare   • Drug use: No   • Sexual activity: Defer      Social History     Social History Narrative   • Not on file        Family History   Problem Relation Age of Onset   • Breast cancer Mother    • Breast cancer Maternal Aunt    • Colon polyps Son    • Colon cancer Neg Hx    • Malig Hyperthermia Neg Hx          Physical Exam: 65 y.o. female  General Appearance:    Alert, cooperative, in no acute distress                      Vitals:    04/13/21 2013 04/13/21 2304 04/14/21 0308 04/14/21 0700   BP: 123/74 103/59  111/62   BP Location:  Left arm  Left arm   Patient Position:  Lying  Lying   Pulse: 83 70  74   Resp:  16 16 16   Temp:  98 °F (36.7 °C) 97.1 °F (36.2 °C) 97 °F (36.1 °C)   TempSrc:  Skin Skin Oral   SpO2:  96%  94%   Weight:       Height:            DIAGNOSTIC TESTS:   Admission on 04/12/2021   Component Date Value Ref Range Status   • ABO Type 04/12/2021 O    Final   • RH type 04/12/2021 Positive   Final   • Antibody Screen 04/12/2021 Negative   Final   • T&S Expiration Date 04/12/2021 4/15/2021 11:59:59 PM   Final   • Hemoglobin 04/12/2021 12.6  12.0 - 15.9 g/dL Final   • Hematocrit 04/12/2021 36.8  34.0 - 46.6 % Final   • Hemoglobin 04/13/2021 10.2* 12.0 - 15.9 g/dL Final   • Hematocrit 04/13/2021 29.1* 34.0 - 46.6 % Final   • Glucose 04/13/2021 127* 65 - 99 mg/dL Final   • BUN 04/13/2021 14  8 - 23 mg/dL Final   • Creatinine 04/13/2021 0.94  0.57 - 1.00 mg/dL Final   • Sodium 04/13/2021 138  136 - 145 mmol/L Final   • Potassium 04/13/2021 4.3  3.5 - 5.2 mmol/L Final   • Chloride 04/13/2021 103  98 - 107 mmol/L Final   • CO2 04/13/2021 26.8  22.0 - 29.0 mmol/L Final   • Calcium 04/13/2021 8.2* 8.6 - 10.5 mg/dL Final   • eGFR Non African Amer 04/13/2021 59* >60 mL/min/1.73 Final   • BUN/Creatinine Ratio 04/13/2021 14.9  7.0 - 25.0 Final   • Anion Gap 04/13/2021 8.2  5.0 - 15.0 mmol/L Final   • Hemoglobin 04/14/2021 9.5* 12.0 - 15.9 g/dL Final   • Hematocrit 04/14/2021 27.8* 34.0 - 46.6 % Final       No results found.    Hospital Course:  65 y.o. female admitted to [facility] to services of Raul Cantu MD with Spinal stenosis, lumbar region with neurogenic claudication [M48.062]  Spinal stenosis, lumbar region, with neurogenic claudication [M48.062] on 4/12/2021 and underwent Lumbar 4-lumbar 5 laminectomy and fusion with local bone graft  Per Raul Cantu MD. Antibiotic and VTE prophylaxis were per SCIP protocols.  The patient was admitted to the floor where IV and/or oral pain medications were administered for postoperative pain.  At discharge the incisional pain was tolerable and preop neurologic function was intact.  The dressing was dry and the wound was clean.    Condition on Discharge:  Stable    Discharge Instructions:   1.  Patient may weight bear as tolerated unless otherwise specified.   2.  May use ice to back incision as needed.   3.  Patient  also instructed on incentive spirometer during hospitalization and encouraged to continue to use at home regularly.   4.  Dressing is waterproof and should remain on and intact until seen in the office at 1st PO visit.  Patient has been instructed to contact the office if dressing becomes loose or saturated.   5.  Patient may shower on POD #3 if and when all wound drainage has stopped.    6.  Back brace should be worn when up and about.  It need not be worn to the bathroom and certainly not in the shower.   7.  Patient is to avoid forward bending and twisting at the waist.  No lifting greater than 5 pounds.         A detailed list of instructions specific to the operation was given to the patient at the time of discharge.    Follow up Instructions:  Follow up in the office with Dr. Raul Cantu Jr. in 2-3 weeks - patient to call the office at [phone] to schedule. Prescriptions were given for pain medication.    Follow-up Appointments  Future Appointments   Date Time Provider Department Center   4/27/2021 10:10 AM Raul Cantu MD [facility]   8/12/2021  9:30 AM Argelia Villasenor APRN [facility]  [facility] None     Additional Instructions for the Follow-ups that You Need to Schedule     Discharge Follow-up with Specialty: Orthopedics; 2 Weeks   As directed      Specialty: Orthopedics    Follow Up: 2 Weeks    Follow Up Details: Return to the office to see Dr. Raul Cantu               Discharge Disposition Plan:today to home    Date: 4/14/2021    Raul Cantu MD  04/14/21  11:11 EDT

## 2021-04-14 NOTE — PLAN OF CARE
Goal Outcome Evaluation:  Plan of Care Reviewed With: patient  Progress: improving  Outcome Summary: POD#2 OF L4-L5 LAMI WITH FUSION. VSS. PO / PCA HELPING WITH PAIN. AMBULATING WELL WITH 1 ASSIST. VOIDING FINE PER BR. DRESSING TO BACK DRY / INTACT.  EDUCATION PROVIDED ON REFLUX AND BP MEDS. POSSIBLE D/C HOME TODAY.

## 2021-04-16 NOTE — PROGRESS NOTES
Case Management Discharge Note      Final Note: Home with family/friend support.         Selected Continued Care - Discharged on 4/14/2021 Admission date: 4/12/2021 - Discharge disposition: Home or Self Care    Destination    No services have been selected for the patient.              Durable Medical Equipment    No services have been selected for the patient.              Dialysis/Infusion    No services have been selected for the patient.              Home Medical Care    No services have been selected for the patient.              Therapy    No services have been selected for the patient.              Community Resources    No services have been selected for the patient.                       Final Discharge Disposition Code: 01 - home or self-care

## 2021-04-22 DIAGNOSIS — M48.062 SPINAL STENOSIS, LUMBAR REGION, WITH NEUROGENIC CLAUDICATION: ICD-10-CM

## 2021-04-22 NOTE — TELEPHONE ENCOUNTER
Caller:   PATIENT     Reason for call:  PATIENT HAD SX 4/12 SHE IS CALLING IN TO REPORT MUSCLE SPASMS.    ALSO NEEDS REFILLS FOR   cyclobenzaprine (FLEXERIL) 10 MG tablet    oxyCODONE-acetaminophen (PERCOCET) 5-325 MG per tablet      [facility] [address] [address] - [place], [place]        PLEASE ADVISE,     Caller# [phone]

## 2021-04-23 RX ORDER — OXYCODONE HYDROCHLORIDE AND ACETAMINOPHEN 5; 325 MG/1; MG/1
TABLET ORAL
Qty: 45 TABLET | Refills: 0 | Status: SHIPPED | OUTPATIENT
Start: 2021-04-23 | End: 2021-05-03 | Stop reason: SDUPTHER

## 2021-04-23 RX ORDER — CYCLOBENZAPRINE HCL 10 MG
10 TABLET ORAL 3 TIMES DAILY PRN
Qty: 30 TABLET | Refills: 0 | Status: SHIPPED | OUTPATIENT
Start: 2021-04-23 | End: 2021-05-03 | Stop reason: SDUPTHER

## 2021-04-27 ENCOUNTER — OFFICE VISIT (OUTPATIENT)
Dept: ORTHOPEDIC SURGERY | Facility: CLINIC | Age: 66
End: 2021-04-27

## 2021-04-27 VITALS — HEIGHT: 64 IN | BODY MASS INDEX: 21.64 KG/M2 | TEMPERATURE: 97.1 F | WEIGHT: 126.76 LBS

## 2021-04-27 DIAGNOSIS — M48.062 SPINAL STENOSIS, LUMBAR REGION, WITH NEUROGENIC CLAUDICATION: Primary | ICD-10-CM

## 2021-04-27 PROCEDURE — 72100 X-RAY EXAM L-S SPINE 2/3 VWS: CPT | Performed by: ORTHOPAEDIC SURGERY

## 2021-04-27 PROCEDURE — 99024 POSTOP FOLLOW-UP VISIT: CPT | Performed by: ORTHOPAEDIC SURGERY

## 2021-04-27 NOTE — PROGRESS NOTES
Back pain is improved.  No significant leg pain.  Two-view x-rays of the lumbar spine obtained to evaluate  fusion bone suggest further healing since prior x-ray.  We will need an AP and lateral lumbar x-ray on return visit.  Instructions were given.  Answers for HPI/ROS submitted by the patient on 4/26/2021  Please describe your symptoms.: Post-Op follow-up appointment (Surgery was on 4/12/21)  Have you had these symptoms before?: No  How long have you been having these symptoms?: 1-2 weeks  Please list any medications you are currently taking for this condition.: All medications as prescribed  What is the primary reason for your visit?: Other

## 2021-05-03 DIAGNOSIS — M48.062 SPINAL STENOSIS, LUMBAR REGION, WITH NEUROGENIC CLAUDICATION: ICD-10-CM

## 2021-05-03 RX ORDER — CYCLOBENZAPRINE HCL 10 MG
10 TABLET ORAL 3 TIMES DAILY PRN
Qty: 30 TABLET | Refills: 0 | Status: SHIPPED | OUTPATIENT
Start: 2021-05-03 | End: 2021-08-12

## 2021-05-03 RX ORDER — OXYCODONE HYDROCHLORIDE AND ACETAMINOPHEN 5; 325 MG/1; MG/1
TABLET ORAL
Qty: 45 TABLET | Refills: 0 | Status: SHIPPED | OUTPATIENT
Start: 2021-05-03 | End: 2021-08-12

## 2021-06-23 ENCOUNTER — OFFICE VISIT (OUTPATIENT)
Dept: ORTHOPEDIC SURGERY | Facility: CLINIC | Age: 66
End: 2021-06-23

## 2021-06-23 VITALS — BODY MASS INDEX: 20.99 KG/M2 | HEIGHT: 65 IN | WEIGHT: 126 LBS | TEMPERATURE: 98.4 F

## 2021-06-23 DIAGNOSIS — M48.062 SPINAL STENOSIS OF LUMBAR REGION WITH NEUROGENIC CLAUDICATION: ICD-10-CM

## 2021-06-23 DIAGNOSIS — M48.062 SPINAL STENOSIS, LUMBAR REGION WITH NEUROGENIC CLAUDICATION: ICD-10-CM

## 2021-06-23 DIAGNOSIS — M48.062 SPINAL STENOSIS, LUMBAR REGION, WITH NEUROGENIC CLAUDICATION: ICD-10-CM

## 2021-06-23 DIAGNOSIS — M54.50 LUMBAR SPINE PAIN: ICD-10-CM

## 2021-06-23 DIAGNOSIS — R52 PAIN: Primary | ICD-10-CM

## 2021-06-23 DIAGNOSIS — M43.16 SPONDYLOLISTHESIS OF LUMBAR REGION: ICD-10-CM

## 2021-06-23 PROCEDURE — 72100 X-RAY EXAM L-S SPINE 2/3 VWS: CPT | Performed by: ORTHOPAEDIC SURGERY

## 2021-06-23 PROCEDURE — 99024 POSTOP FOLLOW-UP VISIT: CPT | Performed by: ORTHOPAEDIC SURGERY

## 2021-06-23 NOTE — PROGRESS NOTES
Back pain is improved.  Dates she is doing great.  No significant leg pain.  Two-view x-rays of the lumbar spine obtained to evaluate   fusion bone suggest further healing since prior x-ray.  We will need an AP and lateral lumbar x-ray on return visit.  Instructions were given.  She can cut the grass on a riding mower for 30 minutes at a time.  Answers for HPI/ROS submitted by the patient on 6/21/2021  Please describe your symptoms.: Post-Op checkup  Have you had these symptoms before?: Yes  How long have you been having these symptoms?: 1-4 days  Please describe any probable cause for these symptoms. : i do not have any symptoms at this time.  This appointment is for a Post-Op checkup.  What is the primary reason for your visit?: Other

## 2021-07-21 ENCOUNTER — HOSPITAL ENCOUNTER (OUTPATIENT)
Dept: PHYSICAL THERAPY | Facility: HOSPITAL | Age: 66
Setting detail: THERAPIES SERIES
Discharge: HOME | End: 2021-07-21

## 2021-07-21 DIAGNOSIS — M48.062 SPINAL STENOSIS OF LUMBAR REGION WITH NEUROGENIC CLAUDICATION: Primary | ICD-10-CM

## 2021-07-21 DIAGNOSIS — M54.50 LUMBAR SPINE PAIN: ICD-10-CM

## 2021-07-21 DIAGNOSIS — M43.16 SPONDYLOLISTHESIS OF LUMBAR REGION: ICD-10-CM

## 2021-07-21 PROCEDURE — 97161 PT EVAL LOW COMPLEX 20 MIN: CPT | Performed by: PHYSICAL THERAPIST

## 2021-07-21 PROCEDURE — 97110 THERAPEUTIC EXERCISES: CPT | Performed by: PHYSICAL THERAPIST

## 2021-07-21 NOTE — THERAPY EVALUATION
Outpatient Physical Therapy Ortho Initial Evaluation   Salima Kate     Patient Name: Lila Pabon  : 1955  MRN: [id number]  Today's Date: 2021      Visit Date: 2021    Patient Active Problem List   Diagnosis   • Esophagitis   • Stress-induced cardiomyopathy   • Type 2 myocardial infarction (CMS/HCC)   • Gastroesophageal reflux disease with esophagitis   • Adenomatous polyp of colon   • Personal history of colonic polyps   • Candidal esophagitis (CMS/HCC)   • Iron deficiency anemia due to chronic blood loss   • Spinal stenosis, lumbar region, with neurogenic claudication   • Spinal stenosis, lumbar region with neurogenic claudication        Past Medical History:   Diagnosis Date   • Anemia     IRON SUPPLEMENTS   • Arthritis    • Colon polyp    • Constipation    • DDD (degenerative disc disease), lumbar    • Environmental allergies    • GERD (gastroesophageal reflux disease)    • Hypertension    • Insomnia    • Lumbar disc herniation    • Lumbar stenosis    • Numbness and tingling of left lower extremity    • Poor balance    • Stress-induced cardiomyopathy 1/15/2019   • Type 2 myocardial infarction (CMS/HCC) 1/15/2019        Past Surgical History:   Procedure Laterality Date   • CARDIAC CATHETERIZATION N/A 2018    Procedure: Left Heart Cath and Cors;  Surgeon: Suhas Almonte MD;  Location: [facility] CATH INVASIVE LOCATION;  Service: Cardiovascular   • CARDIAC CATHETERIZATION N/A 2018    Procedure: Left ventriculography;  Surgeon: Suhas Almonte MD;  Location: [facility] CATH INVASIVE LOCATION;  Service: Cardiovascular   • CARDIAC CATHETERIZATION N/A 12/10/2018    Procedure: Right and Left Heart Cath;  Surgeon: Laquita Jessica MD;  Location: [facility] CATH [facility]VASIVE LOC[facility];  Service: Cardiology   • CARDIAC CATHETERIZATION N/A 12/10/2018    Procedure: Coronary angiography;  Surgeon: Laquita Jessica MD;  Location: [facility] CATH [facility]ASIVE LOC[facility];  Service: Cardiology   • CARDIAC  CATHETERIZATION N/A 12/10/2018    Procedure: Left ventriculography;  Surgeon: Laquita Jessica MD;  Location: [facility] CATH INVASIVE LOCATION;  Service: Cardiology   • CATARACT EXTRACTION Bilateral    • CATARACT EXTRACTION     • COLONOSCOPY     • ENDOSCOPY N/A 6/27/2018    Procedure: ESOPHAGOGASTRODUODENOSCOPY with biopsies;  Surgeon: Rupa Hays MD;  [facility];  Service: Gastroenterology   • ENDOSCOPY N/A 9/18/2018    Procedure: ESOPHAGOGASTRODUODENOSCOPY with biopsies;  Surgeon: Rupa Hays MD;  [facility];  Service: Gastroenterology   • ENDOSCOPY N/A 12/7/2018    Procedure: ESOPHAGOGASTRODUODENOSCOPY JWITH BIOPSIES;  Surgeon: Laureano Rehman MD;  Location:  [facility] OR;  Service: Gastroenterology   • EPIDURAL BLOCK     • GLAUCOMA SURGERY     • HYSTERECTOMY     • LUMBAR FUSION N/A 4/12/2021    Procedure: Lumbar 4-lumbar 5 laminectomy and fusion with local bone graft;  Surgeon: Raul Cantu MD;  Location: [facility] MAIN OR;  Service: Orthopedic Spine;  Laterality: N/A;   • SHOULDER SURGERY Right     RUPTURED BICEP TENDON       Visit Dx:     ICD-10-CM ICD-9-CM   1. Spinal stenosis of lumbar region with neurogenic claudication  M48.062 724.03   2. Spondylolisthesis of lumbar region  M43.16 738.4   3. Lumbar spine pain  M54.5 724.2         Patient History     Row Name 07/21/21 0745 07/19/21 0952          History    Chief Complaint  Difficulty with daily activities;Muscle weakness;Tightness;Pain  -GC  --     Type of Pain  Back pain  -GC  --     Date Current Problem(s) Began  04/12/21  -GC  --     Brief Description of Current Complaint  Pt reports a 20 year history of LBP with left sciatica. The pain was interfering with her ability to perform her daily activities. She underwent a L4/5 spinal fusion and laminectomy. She has been in a back brace since surgery. She was intially limited on her froward flexion and sitting. She states she ahs really not had any pain since  surgery. Her biggest complaint is tightness.  -  Rehab for laminectomy with spinal fusion  (Pended)   (Patient-Rptd)   -patient     Patient/Caregiver Goals  Relieve pain;Return to prior level of function;Improve mobility;Improve strength;Heal wound  -GC  Relieve pain;Return to prior level of function;Improve mobility;Improve strength;Heal wound  (Pended)   (Patient-Rptd)   -patient     Patient's Rating of General Health  Good  -  --     Hand Dominance  right-handed  -  right-handed  (Pended)   (Patient-Rptd)   -patient     Occupation/sports/leisure activities  Pt is retired, enjoys yoga  -  Yoga but had to discontinue in March due to pain and upcoming surgery in April  (Pended)   (Patient-Rptd)   -patient     Patient seeing anyone else for problem(s)?  No  -GC  No  (Pended)   (Patient-Rptd)   -patient     What clinical tests have you had for this problem?  X-ray;MRI;Other 1 (comment)  -  X-ray;MRI;Other 1 (comment)  (Pended)   (Patient-Rptd)   -patient     Additional Clinical Tests  stenosis, spondylolisthesis  -  4/12/21 - laminectomy with spinal fusion  (Pended)   (Patient-Rptd)   -patient     Are you or can you be pregnant  No  -GC  No  (Pended)   (Patient-Rptd)   -patient        Pain     Pain Location  Back  -GC  --     Pain at Present  0 no painat rest  -GC  --     Pain at Best  0  -GC  --     Pain at Worst  2;3  -GC  --     Pain Frequency  Intermittent  -GC  --     Pain Description  Discomfort;Sore;Tightness  -GC  --     What Performance Factors Make the Current Problem(s) WORSE?  Pt c/o pain if she leans too far forward or bends over  -GC  --     What Performance Factors Make the Current Problem(s) BETTER?  Pt has no pain at rest  -GC  --     Difficulties with ADL's?  Pt c/o pain when she leans forward of bend forward wiht ADLssuchas cleaning, dishes  -GC  --     Difficulties with recreational activities?  Pt is unable to participate in yoa at this time  -GC  --        Fall Risk Assessment    Any  falls in the past year:  No  -GC  No  (Pended)   (Patient-Rptd)   -patient        Services    Prior Rehab/Home Health Experiences  Yes  -GC  Yes  (Pended)   (Patient-Rptd)   -patient     Are you currently receiving Home Health services  No  -GC  No  (Pended)   (Patient-Rptd)   -patient     Do you plan to receive Home Health services in the near future  No  -GC  No  (Pended)   (Patient-Rptd)   -patient        Daily Activities    Primary Language  English  -GC  English  (Pended)   (Patient-Rptd)   -patient     Are you able to read  Yes  -GC  Yes  (Pended)   (Patient-Rptd)   -patient     Are you able to write  Yes  -GC  Yes  (Pended)   (Patient-Rptd)   -patient     How does patient learn best?  Demonstration  -GC  Demonstration  (Pended)   (Patient-Rptd)   -patient     Patient is concerned about/has problems with  Difficulty with self care (i.e. bathing, dressing, toileting:;Flexibility;Performing home management (household chores, shopping, care of dependents);Performing job responsibilities/community activities (work, school,;Sitting;Standing;Walking  -GC  --     Does patient have problems with the following?  None  -GC  --     Barriers to learning  None  -GC  --     Functional Status  mobility issues preventing performance of daily activities  -GC  --     Pt Participated in POC and Goals  Yes  -GC  --        Safety    Are you being hurt, hit, or frightened by anyone at home or in your life?  No  -GC  No  (Pended)   (Patient-Rptd)   -patient     Are you being neglected by a caregiver  No  -GC  --       User Key  (r) = Recorded By, (t) = Taken By, (c) = Cosigned By    Initials Name Provider Type    GC Benny Knight, PT Physical Therapist    patient Lila Pabon --          PT Ortho     Row Name 07/21/21 4586       Posture/Observations    Posture/Observations Comments  Pt initially seen with back brace on. The surgical site is nicely healed with good scar mobility. She has a reduced lumbar lordosis and no lateral  shift  -GC       DTR- Lower Quarter Clearing    Patellar tendon (L2-4)  Bilateral:;2- Normal response  -GC    Achilles tendon (S1-2)  Bilateral:;2- Normal response  -GC       Sensory Screen for Light Touch- Lower Quarter Clearing    L1 (inguinal area)  Bilateral:;Intact  -GC    L2 (anterior mid thigh)  Bilateral:;Intact  -GC    L3 (distal anterior thigh)  Bilateral:;Intact  -GC    L4 (medial lower leg/foot)  Bilateral:;Intact  -GC    L5 (lateral lower leg/great toe)  Bilateral:;Intact  -GC    S1 (bottom of foot)  Bilateral:;Intact  -GC       Myotomal Screen- Lower Quarter Clearing    Hip flexion (L2)  Bilateral:;5 (Normal)  -GC    Knee extension (L3)  Bilateral:;5 (Normal)  -GC    Ankle DF (L4)  Bilateral:;5 (Normal)  -GC    Ankle PF (S1)  Bilateral:;5 (Normal)  -GC    Knee flexion (S2)  Bilateral:;5 (Normal)  -GC       Head/Neck/Trunk    Trunk Extension AROM  10% range  -GC    Trunk Flexion AROM  25% range  -GC    Trunk Lt Lateral Flexion AROM  25% range  -GC    Trunk Rt Lateral Flexion AROM  25% range  -GC    Trunk Lt Rotation AROM  50% range  -GC    Trunk Rt Rotation AROM  50% range  -GC       MMT Neck/Trunk    Trunk Flexion MMT, Gross Movement  (3+/5) fair plus  -GC    Trunk Extension MMT, Gross Movement  (4/5) good  -GC       Lower Extremity Flexibility    Hamstrings  Bilateral:;Moderately limited  -GC    Hip Flexors  Bilateral:;Mildly limited  -GC    Hip External Rotators  Bilateral:;Moderately limited  -GC    Hip Internal Rotators  Bilateral:;Moderately limited  -GC    Quadratus Lumborum  Bilateral:;Moderately limited  -GC       Transfers    Comment (Transfers)  Pt is independent with all bed mobility and transfers. She does have some pain going sit to/from supine and rolling supine to/from prone  -       Gait/Stairs (Locomotion)    Comment (Gait/Stairs)  Pt ambulates normally on level surfaces  -      User Key  (r) = Recorded By, (t) = Taken By, (c) = Cosigned By    Initials Name Provider Type      Benny Knight, PT Physical Therapist                      Therapy Education  Given: HEP, Symptoms/condition management, Pain management, Posture/body mechanics  Program: New  How Provided: Verbal, Demonstration, Written  Provided to: Patient  Level of Understanding: Teach back education performed, Verbalized, Demonstrated     PT OP Goals     Row Name 07/21/21 0745          PT Short Term Goals    STG Date to Achieve  08/11/21  -GC     STG 1  Decrease LBP to 2-3/10 with activity.  -GC     STG 2  Increase trunk ROM to at least 50% range with testing.  -GC     STG 3  Increase trunk and LE flexibility to only a minimal restriction with testing.  -GC     STG 4  Increase core strength to at least 4+/5 with testing.  -GC     STG 5  Pt will be independent with her hEP issued by this therapist.  -        Long Term Goals    LTG Date to Achieve  09/01/21  -GC     LTG 1  Decrease LBP to 0-1/10 with activity.  -GC     LTG 2  Increase trunk ROM to at least 75% range with testing.  -GC     LTG 3  Increase trunk and LE flexibility to WFL with testing.  -GC     LTG 4  Increase core strength to 5/5 with testing.  -GC     LTG 5  Pt will be iondependent with all ADLs without pain.  -        Time Calculation    PT Goal Re-Cert Due Date  08/18/21  -       User Key  (r) = Recorded By, (t) = Taken By, (c) = Cosigned By    Initials Name Provider Type     Benny Knight, PT Physical Therapist          PT Assessment/Plan     Row Name 07/21/21 0745          PT Assessment    Functional Limitations  Limitation in home management;Limitations in community activities;Limitations in functional capacity and performance;Performance in leisure activities  -     Impairments  Impaired flexibility;Range of motion;Pain;Muscle strength  -     Assessment Comments  Pt presents approximately 3 months s/p lumbar laminectomy. she has pain that she rates up to 3-4/10 if she leans forward or bends forward too far. She has decreased trunk ROM, decreased  trunk and LE flexibility, decreaesed core strength, and decreased function secodnary to the above.  -GC     Please refer to paper survey for additional self-reported information  Yes  -GC     Rehab Potential  Good  -GC     Patient/caregiver participated in establishment of treatment plan and goals  Yes  -GC     Patient would benefit from skilled therapy intervention  Yes  -GC        PT Plan    PT Frequency  1x/week  -GC     Predicted Duration of Therapy Intervention (PT)  4 weeks  -GC     Planned CPT's?  PT EVAL LOW COMPLEXITY: 29467;PT THER PROC EA 15 MIN: 43750;PT HOT OR COLD PACK TREAT MCARE;PT ELECTRICAL STIM UNATTEND:   -GC     PT Plan Comments  Pt is to continue her HEP 2x daily.  -GC       User Key  (r) = Recorded By, (t) = Taken By, (c) = Cosigned By    Initials Name Provider Type     Benny Knight PT Physical Therapist            OP Exercises     Row Name 07/21/21 0745             Exercise 1    Exercise Name 1  Hamstring Stretch-bilateral  -GC      Cueing 1  Verbal;Tactile  -GC      Reps 1  10  -GC      Time 1  10 secs  -GC         Exercise 2    Exercise Name 2  Piriformis stretch-bilateral  -GC      Cueing 2  Verbal;Tactile  -GC      Reps 2  10  -GC      Time 2  10 secs  -GC         Exercise 3    Exercise Name 3  SKTC-bilateral  -GC      Cueing 3  Verbal;Tactile  -GC      Reps 3  10  -GC      Time 3  10 secs  -GC         Exercise 4    Exercise Name 4  LTR-bilateral  -GC      Cueing 4  Verbal;Tactile  -GC      Reps 4  10  -GC      Time 4  10 secs  -GC        User Key  (r) = Recorded By, (t) = Taken By, (c) = Cosigned By    Initials Name Provider Type     Benny Knight PT Physical Therapist                                  Time Calculation:     Start Time: 0745  Stop Time: 0839  Time Calculation (min): 54 min     Therapy Charges for Today     Code Description Service Date Service Provider Modifiers Qty    40548031681  PT EVAL LOW COMPLEXITY 2 7/21/2021 Benny Knight, PT GP 1    [id number]   PT THER PROC EA 15 MIN 7/21/2021 Benny Knight, PT GP 1                    Benny Knight, PT  7/21/2021

## 2021-07-28 ENCOUNTER — HOSPITAL ENCOUNTER (OUTPATIENT)
Dept: PHYSICAL THERAPY | Facility: HOSPITAL | Age: 66
Setting detail: THERAPIES SERIES
Discharge: HOME | End: 2021-07-28

## 2021-07-28 DIAGNOSIS — M54.50 LUMBAR SPINE PAIN: ICD-10-CM

## 2021-07-28 DIAGNOSIS — M48.062 SPINAL STENOSIS OF LUMBAR REGION WITH NEUROGENIC CLAUDICATION: Primary | ICD-10-CM

## 2021-07-28 DIAGNOSIS — M43.16 SPONDYLOLISTHESIS OF LUMBAR REGION: ICD-10-CM

## 2021-07-28 PROCEDURE — 97110 THERAPEUTIC EXERCISES: CPT | Performed by: PHYSICAL THERAPIST

## 2021-07-28 NOTE — THERAPY TREATMENT NOTE
Outpatient Physical Therapy Ortho Treatment Note   Salima Kate     Patient Name: Lila Pabon  : 1955  MRN: [id number]  Today's Date: 2021      Visit Date: 2021    Visit Dx:    ICD-10-CM ICD-9-CM   1. Spinal stenosis of lumbar region with neurogenic claudication  M48.062 724.03   2. Spondylolisthesis of lumbar region  M43.16 738.4   3. Lumbar spine pain  M54.5 724.2       Patient Active Problem List   Diagnosis   • Esophagitis   • Stress-induced cardiomyopathy   • Type 2 myocardial infarction (CMS/HCC)   • Gastroesophageal reflux disease with esophagitis   • Adenomatous polyp of colon   • Personal history of colonic polyps   • Candidal esophagitis (CMS/HCC)   • Iron deficiency anemia due to chronic blood loss   • Spinal stenosis, lumbar region, with neurogenic claudication   • Spinal stenosis, lumbar region with neurogenic claudication        Past Medical History:   Diagnosis Date   • Anemia     IRON SUPPLEMENTS   • Arthritis    • Colon polyp    • Constipation    • DDD (degenerative disc disease), lumbar    • Environmental allergies    • GERD (gastroesophageal reflux disease)    • Hypertension    • Insomnia    • Lumbar disc herniation    • Lumbar stenosis    • Numbness and tingling of left lower extremity    • Poor balance    • Stress-induced cardiomyopathy 1/15/2019   • Type 2 myocardial infarction (CMS/HCC) 1/15/2019        Past Surgical History:   Procedure Laterality Date   • CARDIAC CATHETERIZATION N/A 2018    Procedure: Left Heart Cath and Cors;  Surgeon: Suhas Almonte MD;  Location: [facility] INVASIVE LOCATION;  Service: Cardiovascular   • CARDIAC CATHETERIZATION N/A 2018    Procedure: Left ventriculography;  Surgeon: Suhas Almonte MD;  Location: [facility] CATH INVASIVE LOCATION;  Service: Cardiovascular   • CARDIAC CATHETERIZATION N/A 12/10/2018    Procedure: Right and Left Heart Cath;  Surgeon: Laquita Jessica MD;  Location: [facility] CATH INVASIVE LOCATION;   Service: Cardiology   • CARDIAC CATHETERIZATION N/A 12/10/2018    Procedure: Coronary angiography;  Surgeon: Laquita Jessica MD;  Location:  [facility] CATH INVASIVE LOCATION;  Service: Cardiology   • CARDIAC CATHETERIZATION N/A 12/10/2018    Procedure: Left ventriculography;  Surgeon: Laquita Jessica MD;  Location:  [facility] CATH INVASIVE LOCATION;  Service: Cardiology   • CATARACT EXTRACTION Bilateral    • CATARACT EXTRACTION     • COLONOSCOPY     • ENDOSCOPY N/A 6/27/2018    Procedure: ESOPHAGOGASTRODUODENOSCOPY with biopsies;  Surgeon: Rupa Hays MD;  Location:  [facility] ENDOSCOPY;  Service: Gastroenterology   • ENDOSCOPY N/A 9/18/2018    Procedure: ESOPHAGOGASTRODUODENOSCOPY with biopsies;  Surgeon: Rupa Hays MD;  Location:  [facility] ENDOSCOPY;  Service: Gastroenterology   • ENDOSCOPY N/A 12/7/2018    Procedure: ESOPHAGOGASTRODUODENOSCOPY JWITH BIOPSIES;  Surgeon: Laureano Rehman MD;  Location: [facility] OR;  Service: Gastroenterology   • EPIDURAL BLOCK     • GLAUCOMA SURGERY     • HYSTERECTOMY     • LUMBAR FUSION N/A 4/12/2021    Procedure: Lumbar 4-lumbar 5 laminectomy and fusion with local bone graft;  Surgeon: Raul Cantu MD;  Location: [facility] MAIN OR;  Service: Orthopedic Spine;  Laterality: N/A;   • SHOULDER SURGERY Right     RUPTURED BICEP TENDON       PT Ortho     Row Name 07/28/21 0800       Subjective Comments    Subjective Comments  Pt states she was really sore after she woke up lying on her stomach.  -      User Key  (r) = Recorded By, (t) = Taken By, (c) = Cosigned By    Initials Name Provider Type     Benny Knight, PT Physical Therapist                      PT Assessment/Plan     Row Name 07/28/21 0800          PT Assessment    Assessment Comments  Pt tolerated her exercise progression to core strengthening well. She is showing increased range with her stretching.  -        PT Plan    PT Plan Comments  Pt is to continue her HEP daily. Will re-ck again next week.  -        User Key  (r) = Recorded By, (t) = Taken By, (c) = Cosigned By    Initials Name Provider Type    GC Benny Knight, PT Physical Therapist            OP Exercises     Row Name 07/28/21 0800             Subjective Comments    Subjective Comments  Pt states she was really sore after she woke up lying on her stomach.  -GC         Exercise 1    Exercise Name 1  Hamstring Stretch-bilateral  -GC      Cueing 1  Verbal;Tactile  -GC      Reps 1  10  -GC      Time 1  10 secs  -GC         Exercise 2    Exercise Name 2  Piriformis stretch-bilateral  -GC      Cueing 2  Verbal;Tactile  -GC      Reps 2  10  -GC      Time 2  10 secs  -GC         Exercise 3    Exercise Name 3  SKTC-bilateral  -GC      Cueing 3  Verbal;Tactile  -GC      Reps 3  10  -GC      Time 3  10 secs  -GC         Exercise 4    Exercise Name 4  LTR-bilateral  -GC      Cueing 4  Verbal;Tactile  -GC      Reps 4  10  -GC      Time 4  10 secs  -GC         Exercise 5    Exercise Name 5  PPT with LE marching  -GC      Cueing 5  Verbal;Tactile  -GC      Reps 5  30  -GC         Exercise 6    Exercise Name 6  PPT with LE EXT  -GC      Cueing 6  Verbal;Tactile  -GC      Reps 6  30  -GC         Exercise 7    Exercise Name 7  Prone hip EXT-alternately  -GC      Cueing 7  Verbal;Tactile  -GC      Reps 7  30  -GC      Time 7  over 1 pillow  -GC        User Key  (r) = Recorded By, (t) = Taken By, (c) = Cosigned By    Initials Name Provider Type     Benny Knight, PT Physical Therapist                                          Time Calculation:   Start Time: 0800  Stop Time: 0842  Time Calculation (min): 42 min  Therapy Charges for Today     Code Description Service Date Service Provider Modifiers Qty    [id number]  PT THER PROC EA 15 MIN 7/28/2021 Benny Knight, PT GP 2                    Benny Knight PT  7/28/2021

## 2021-08-04 ENCOUNTER — HOSPITAL ENCOUNTER (OUTPATIENT)
Dept: PHYSICAL THERAPY | Facility: HOSPITAL | Age: 66
Setting detail: THERAPIES SERIES
Discharge: HOME | End: 2021-08-04

## 2021-08-04 DIAGNOSIS — M48.062 SPINAL STENOSIS OF LUMBAR REGION WITH NEUROGENIC CLAUDICATION: Primary | ICD-10-CM

## 2021-08-04 DIAGNOSIS — M54.50 LUMBAR SPINE PAIN: ICD-10-CM

## 2021-08-04 DIAGNOSIS — M43.16 SPONDYLOLISTHESIS OF LUMBAR REGION: ICD-10-CM

## 2021-08-04 PROCEDURE — 97110 THERAPEUTIC EXERCISES: CPT | Performed by: PHYSICAL THERAPIST

## 2021-08-04 NOTE — THERAPY TREATMENT NOTE
Outpatient Physical Therapy Ortho Treatment Note   Salima Kate     Patient Name: Lila Pabon  : 1955  MRN: [id number]  Today's Date: 2021      Visit Date: 2021    Visit Dx:    ICD-10-CM ICD-9-CM   1. Spinal stenosis of lumbar region with neurogenic claudication  M48.062 724.03   2. Spondylolisthesis of lumbar region  M43.16 738.4   3. Lumbar spine pain  M54.5 724.2       Patient Active Problem List   Diagnosis   • Esophagitis   • Stress-induced cardiomyopathy   • Type 2 myocardial infarction (CMS/HCC)   • Gastroesophageal reflux disease with esophagitis   • Adenomatous polyp of colon   • Personal history of colonic polyps   • Candidal esophagitis (CMS/HCC)   • Iron deficiency anemia due to chronic blood loss   • Spinal stenosis, lumbar region, with neurogenic claudication   • Spinal stenosis, lumbar region with neurogenic claudication        Past Medical History:   Diagnosis Date   • Anemia     IRON SUPPLEMENTS   • Arthritis    • Colon polyp    • Constipation    • DDD (degenerative disc disease), lumbar    • Environmental allergies    • GERD (gastroesophageal reflux disease)    • Hypertension    • Insomnia    • Lumbar disc herniation    • Lumbar stenosis    • Numbness and tingling of left lower extremity    • Poor balance    • Stress-induced cardiomyopathy 1/15/2019   • Type 2 myocardial infarction (CMS/HCC) 1/15/2019        Past Surgical History:   Procedure Laterality Date   • CARDIAC CATHETERIZATION N/A 2018    Procedure: Left Heart Cath and Cors;  Surgeon: Suhas Almonte MD;  Location: [facility] INVASIVE LOCATION;  Service: Cardiovascular   • CARDIAC CATHETERIZATION N/A 2018    Procedure: Left ventriculography;  Surgeon: Suhas Almonte MD;  Location: [facility] CATH INVASIVE LOCATION;  Service: Cardiovascular   • CARDIAC CATHETERIZATION N/A 12/10/2018    Procedure: Right and Left Heart Cath;  Surgeon: Laquita Jessica MD;  Location: [facility] CATH INVASIVE LOCATION;   Service: Cardiology   • CARDIAC CATHETERIZATION N/A 12/10/2018    Procedure: Coronary angiography;  Surgeon: Laquita Jessica MD;  Location:  [facility] CATH INVASIVE LOCATION;  Service: Cardiology   • CARDIAC CATHETERIZATION N/A 12/10/2018    Procedure: Left ventriculography;  Surgeon: Laquita Jessica MD;  Location:  [facility] CATH INVASIVE LOCATION;  Service: Cardiology   • CATARACT EXTRACTION Bilateral    • CATARACT EXTRACTION     • COLONOSCOPY     • ENDOSCOPY N/A 6/27/2018    Procedure: ESOPHAGOGASTRODUODENOSCOPY with biopsies;  Surgeon: Rupa Hays MD;  Location:  [facility] ENDOSCOPY;  Service: Gastroenterology   • ENDOSCOPY N/A 9/18/2018    Procedure: ESOPHAGOGASTRODUODENOSCOPY with biopsies;  Surgeon: Rupa Hays MD;  Location:  [facility];  Service: Gastroenterology   • ENDOSCOPY N/A 12/7/2018    Procedure: ESOPHAGOGASTRODUODENOSCOPY JWITH BIOPSIES;  Surgeon: Laureano Rehman MD;  Location: [facility] OR;  Service: Gastroenterology   • EPIDURAL BLOCK     • GLAUCOMA SURGERY     • HYSTERECTOMY     • LUMBAR FUSION N/A 4/12/2021    Procedure: Lumbar 4-lumbar 5 laminectomy and fusion with local bone graft;  Surgeon: Raul Cantu MD;  Location: [facility] MAIN OR;  Service: Orthopedic Spine;  Laterality: N/A;   • SHOULDER SURGERY Right     RUPTURED BICEP TENDON                       PT Assessment/Plan     Row Name 08/04/21 0800          PT Assessment    Assessment Comments  Pt is doing well with good tolerance to her exercise progression.Have advised pt not to hula hoop for 60' at this time.  -        PT Plan    PT Plan Comments  Pt is to continue her HEP daily. Will re-ck again in 2 weeks.  -       User Key  (r) = Recorded By, (t) = Taken By, (c) = Cosigned By    Initials Name Provider Type    Benny Blackwell, PT Physical Therapist            OP Exercises     Row Name 08/04/21 0800             Subjective Comments    Subjective Comments  Pt states her back is still a little sore. She asked if she  can return to [facility] for an hour a day.  -GC         Exercise 1    Exercise Name 1  Hamstring Stretch-bilateral  -GC      Cueing 1  Verbal;Tactile  -GC      Reps 1  10  -GC      Time 1  10 secs  -GC         Exercise 2    Exercise Name 2  Piriformis stretch-bilateral  -GC      Cueing 2  Verbal;Tactile  -GC      Reps 2  10  -GC      Time 2  10 secs  -GC         Exercise 3    Exercise Name 3  SKTC-bilateral  -GC      Cueing 3  Verbal;Tactile  -GC      Reps 3  10  -GC      Time 3  10 secs  -GC         Exercise 4    Exercise Name 4  LTR-bilateral  -GC      Cueing 4  Verbal;Tactile  -GC      Reps 4  10  -GC      Time 4  10 secs  -GC         Exercise 5    Exercise Name 5  PPT with LE marching  -GC      Cueing 5  Verbal;Tactile  -GC      Reps 5  20  -GC      Time 5  10#  -GC         Exercise 6    Exercise Name 6  PPT with LE EXT  -GC      Cueing 6  Verbal;Tactile  -GC      Reps 6  20  -GC      Time 6  10#  -GC         Exercise 7    Exercise Name 7  Prone hip EXT-alternately  -GC      Cueing 7  Verbal;Tactile  -GC      Reps 7  30  -GC      Time 7  over 1 pillow  -GC         Exercise 8    Exercise Name 8  Bridge with theraband  -GC      Cueing 8  Verbal;Tactile  -GC      Reps 8  25  -GC      Time 8  black  -GC        User Key  (r) = Recorded By, (t) = Taken By, (c) = Cosigned By    Initials Name Provider Type     Benny Knight, PT Physical Therapist                                          Time Calculation:   Start Time: 0800  Stop Time: 0843  Time Calculation (min): 43 min  Therapy Charges for Today     Code Description Service Date Service Provider Modifiers Qty    [id number] HC PT THER PROC EA 15 MIN 8/4/2021 Benny Knight, PT GP 2                    Benny Knight PT  8/4/2021

## 2021-08-12 ENCOUNTER — OFFICE VISIT (OUTPATIENT)
Dept: GASTROENTEROLOGY | Facility: CLINIC | Age: 66
End: 2021-08-12

## 2021-08-12 VITALS
WEIGHT: 127.6 LBS | DIASTOLIC BLOOD PRESSURE: 82 MMHG | SYSTOLIC BLOOD PRESSURE: 144 MMHG | HEIGHT: 65 IN | BODY MASS INDEX: 21.26 KG/M2

## 2021-08-12 DIAGNOSIS — R14.2 BELCHING SYMPTOM: ICD-10-CM

## 2021-08-12 DIAGNOSIS — D50.0 IRON DEFICIENCY ANEMIA DUE TO CHRONIC BLOOD LOSS: ICD-10-CM

## 2021-08-12 DIAGNOSIS — Z86.010 PERSONAL HISTORY OF COLONIC POLYPS: ICD-10-CM

## 2021-08-12 DIAGNOSIS — Z12.11 ENCOUNTER FOR SCREENING FOR MALIGNANT NEOPLASM OF COLON: Primary | ICD-10-CM

## 2021-08-12 DIAGNOSIS — K21.00 GASTROESOPHAGEAL REFLUX DISEASE WITH ESOPHAGITIS WITHOUT HEMORRHAGE: ICD-10-CM

## 2021-08-12 PROCEDURE — 99214 OFFICE O/P EST MOD 30 MIN: CPT | Performed by: NURSE PRACTITIONER

## 2021-08-12 RX ORDER — FAMOTIDINE 40 MG/1
40 TABLET, FILM COATED ORAL 2 TIMES DAILY
Qty: 60 TABLET | Refills: 0 | Status: SHIPPED | OUTPATIENT
Start: 2021-08-12 | End: 2021-09-07

## 2021-08-12 RX ORDER — FAMOTIDINE 40 MG/1
40 TABLET, FILM COATED ORAL 2 TIMES DAILY
Qty: 180 TABLET | Refills: 3 | Status: SHIPPED | OUTPATIENT
Start: 2021-08-12 | End: 2022-06-30

## 2021-08-12 NOTE — PATIENT INSTRUCTIONS
"Since we got her last colon records, they show that she is due for her repeat colon with last in 2016 and 3 polyps.     Colonoscopy when ok with Dr. Cantu and ask about if she needs an antibiotic with procedure.     Sent in 90 day rx for famotidine to mail order and 30 day rx to [facility].     Discussed the importance of taking Pantoprazole/Protonix, 40mg twice daily before breakfast and dinner permanently due to known risk with long term acid reflux of Crowell's esophagus/esophageal cancer., In addition, take famotidine (Pepcid) 40mg twice a day., You have also been prescribed Sucralfate/Carafate.  Take 1gm tablet (you may crush, cut, dissolve or swallow whole with a big drink of water) one time a day at least 30 min after your other medication and you can take it right before bedtime.      Low Acid Diet Instructions:   Don't eat late, don't eat fried or spicy, and don't eat too much at one time. Avoid alcohol and  tomato based products like pizza, lasagna, spaghetti.  If you want to have these take an over the counter Pepcid or TUMS immediately before you eat them.  Do not eat within 3-4 hrs of bedtime. Limit caffeine and carbonated beverages, if you must have them do not have later in the day.  If reflux is severe elevate the head of the bed. You may also use TUMS, maalox, or mylanta for breakthrough heartburn.    Take a daily probiotic (something with a billion cultures and more different strains is better, examples like \"Probiotic 10\" or probiotic gummies) for your gut as well.  AVOID taking NSAIDS (like ibuprofen, Aleve, Motrin, naproxen, meloxicam, etc) as much as possible and use acetaminophen (Tylenol) instead.    Drink lots of water, eat a high fiber diet with 25-30gm a day(check the fiber content in the foods you eat.  Protein bars with 15gm of fiber in each bar are a great supplement daily), and get regular exercise. Use over the counter simethicone xtra strength gas x (125-180mg) 2 twice a day as needed " for gas.     High Fiber Food suggestions:  Beans, nuts, vegetables, whole grains, flax seed and tawnya seeds (which can be stirred into other food) are high in fiber.    Grewal Protein bars from [facility] = 10gm of fiber in each bar (also gluten free)  Quest Protein bars from grocery stores [employer], [employer], [employer]ral[employer]= 15gm of fiber each (also gluten free)  Fiber One bars from grocery stores = 5-6gm of fiber each  Kelloggs Bran Buds cereal 1/2 cup = 17gm of fiber  [employer] brand low sugar Craisins = 13gm of fiber per serving  Melalueca Fiberwise powder found on [employer]=15gm fiber per serving (2 scoops)  CarbBalance tortillas= 15gm of fiber each  Natural Ovens Keto-Friendly Bread found at [facility]=12gm fiber per serving  No Darion vegan bar at [facility]=15gm fiber per serving  Aunt Millies Live Light bread from [facility]=7gm in 2 slices with 80 darion.

## 2021-08-12 NOTE — PROGRESS NOTES
PATIENT INFORMATION  Lila Pabon     - 1955    CHIEF COMPLAINT  Chief Complaint   Patient presents with   • Heartburn   • Anemia     Fe def       HISTORY OF PRESENT ILLNESS  Obtained her colon records from [place] since last visit:  18 EGD  for Dyspepsia Pos Candida esophagitis,Reflux   16 colon in [facility] gd prep, 34TAs, sig div 2021 recall      20 Dr. Rehman visit:  folowed for history of polyps and her GERD on BID PPI and carafate for gastritis and is requesting Ibuprofen for hands and back, tramadol given   Has changed her eating habits and is doing well only rare breakthrough but responds to carafate AND takes about 3 times a day.        last visit with me: At this point is taking her meds and only has breakthrough HB when she does not eat right. She cooks ff Heart healthy and salt free and avoids acidic fruit and vegetables.      She is not having any s/s of the candida and is having a regular bm.  She does eat yogurt and takes a daily probiotic.     plan:  Protonix bid and carafate qid and low acid diet.   Recommend daily probiotic. Increase your Colace dose as needed (250mg). Tramadol for arthritis and avoid ibuprofen, aleve etc. Take tylenol three times a day as needed.     Today: protonix 40 bid, sucralfate tid, daily iron, four months ago dropped her hgb from 12.5 to 9.6 in 2 days following lumbar fusion by Dr. Cantu normally runs in 12-13 range  Burping and belching still every day but no severe attacks with sucralfate.     She forgets to take the sucralfate in the middle of the day but always takes it at bedtime.  Does wake up with sour brash and some nausea.      bm are usually every day now that she is off the pain medication.  Took miralax for 3 months after her back surgery for constipation but doesn't like it.        REVIEWED PERTINENT RESULTS/ LABS  Lab Results   Component Value Date    CASEREPORT  2018     Surgical Pathology Report                          Case: [id number]                                  Authorizing Provider:  Laureano Rehman        Collected:           12/07/2018 04:34 PM                                 MD Silvia                                                                   Ordering Location:     [facility]   Received:            12/07/2018 05:00 PM                                 OR                                                                           Pathologist:           Hilario Maloney MD                                                     Specimens:   1) - Stomach, Gastric Biopsy                                                                        2) - Esophagus, Distal, Distal Esophagus Biopsy                                            FINALDX  12/07/2018     1. Gastric Biopsy:   A. Benign gastric mucosa with no evidence of active gastritis nor neoplasm identified.   B. No metaplastic nor dysplastic change is identified.   C. Negative for Helicobacter.    2. Distal Esophageal Biopsy:   A. Benign gastric mucosa with diffuse mild chronic inflammation.   B. No metaplastic nor dysplastic change is identified.   C. Benign squamous mucosa with focal mild acute inflammation.   D. Fungal hyphae and yeast forms morphologically consistent with Candida species.    DMA/jse        Lab Results   Component Value Date    HGB 9.5 (L) 04/14/2021    MCV 88.2 04/05/2021     04/05/2021    ALT 50 (H) 12/05/2018    AST 53 (H) 12/05/2018      No results found.    CURRENT MEDICATIONS    Current Outpatient Medications:   •  aspirin (aspirin) 81 MG EC tablet, Take 81 mg by mouth Daily., Disp: , Rfl:   •  calcium carb-cholecalciferol (CALCIUM 600+D) 600-800 MG-UNIT tablet, Take 1 tablet by mouth Every Night., Disp: , Rfl:   •  docusate sodium (COLACE) 100 MG capsule, Take 100 mg by mouth Every Night., Disp: , Rfl:   •  estrogens, conjugated, (PREMARIN) 0.3 MG tablet, Take 0.3 mg by mouth Every Night. Take daily for 21 days then  do not take for 7 days. , Disp: , Rfl:   •  ferrous sulfate 140 (45 Fe) MG tablet controlled-release tablet, Take 140 mg by mouth Every Night., Disp: , Rfl:   •  fluticasone (FLONASE) 50 MCG/ACT nasal spray, 2 sprays into the nostril(s) as directed by provider Every Night., Disp: , Rfl:   •  losartan (COZAAR) 25 MG tablet, Take 25 mg by mouth Every Night., Disp: , Rfl:   •  pantoprazole (PROTONIX) 40 MG EC tablet, Take 1 tablet by mouth 2 (Two) Times a Day Before Meals. (Patient taking differently: Take 40 mg by mouth Every Night.), Disp: 180 tablet, Rfl: 3  •  sucralfate (CARAFATE) 1 g tablet, Take 1 tablet by mouth 4 (Four) Times a Day Before Meals & at Bedtime As Needed (Heartburn)., Disp: 360 tablet, Rfl: 3  •  traZODone (DESYREL) 100 MG tablet, Take 100 mg by mouth Every Night., Disp: , Rfl:   •  famotidine (PEPCID) 40 MG tablet, Take 1 tablet by mouth 2 (Two) Times a Day. With lunch and at bedtime, Disp: 180 tablet, Rfl: 3  •  famotidine (PEPCID) 40 MG tablet, Take 1 tablet by mouth 2 (Two) Times a Day. With lunch and at bedtime, Disp: 60 tablet, Rfl: 0    ALLERGIES  Mobic [meloxicam]    REVIEW OF SYSTEMS  ROS: 14 point review of systems was performed and all other systems were reviewed and are negative except for documented findings in HPI and today's encounter.   Review of Systems      History     Last Reviewed by Argelia Villasenor APRN on 8/12/2021 at  9:38 AM    Sections Reviewed    Tobacco, Family, Medical, Surgical      Problem list reviewed by Argelia Villasenor APRN on 8/12/2021 at  9:38 AM  Medicines reviewed by Argelia Villasenor APRN on 8/12/2021 at  9:38 AM  Allergies reviewed by Argelia Villasenor APRN on 8/12/2021 at  9:38 AM      ACTIVE PROBLEMS  Patient Active Problem List    Diagnosis    • Spinal stenosis, lumbar region, with neurogenic claudication [M48.062]    • Spinal stenosis, lumbar region with neurogenic claudication [M48.062]    • Candidal esophagitis (CMS/HCC) [B37.81]    • Iron deficiency anemia  due to chronic blood loss [D50.0]    • Personal history of colonic polyps [Z86.010]    • Gastroesophageal reflux disease with esophagitis [K21.00]    • Adenomatous polyp of colon [D12.6]    • Stress-induced cardiomyopathy [I51.81]    • Type 2 myocardial infarction (CMS/HCC) [I21.A1]    • Esophagitis [K20.90]          PAST MEDICAL HISTORY  Past Medical History:   Diagnosis Date   • Anemia     IRON SUPPLEMENTS   • Arthritis    • Colon polyp    • Constipation    • DDD (degenerative disc disease), lumbar    • Environmental allergies    • GERD (gastroesophageal reflux disease)    • Hypertension    • Insomnia    • Lumbar disc herniation    • Lumbar stenosis    • Numbness and tingling of left lower extremity    • Poor balance    • Stress-induced cardiomyopathy 1/15/2019   • Type 2 myocardial infarction (CMS/HCC) 1/15/2019         SURGICAL HISTORY  Past Surgical History:   Procedure Laterality Date   • CARDIAC CATHETERIZATION N/A 6/26/2018    Procedure: Left Heart Cath and Cors;  Surgeon: Suhas Almonte MD;  [facility];  Service: Cardiovascular   • CARDIAC CATHETERIZATION N/A 6/26/2018    Procedure: Left ventriculography;  Surgeon: Suhas Almonte MD;  Location:  [facility] CATH INVASIVE LOCATION;  Service: Cardiovascular   • CARDIAC CATHETERIZATION N/A 12/10/2018    Procedure: Right and Left Heart Cath;  Surgeon: Laquita Jessica MD;  Location:  [facility] CATH INVASIVE LOCATION;  Service: Cardiology   • CARDIAC CATHETERIZATION N/A 12/10/2018    Procedure: Coronary angiography;  Surgeon: Laquita Jessica MD;  Location:  [facility] CATH INVASIVE LOCATION;  Service: Cardiology   • CARDIAC CATHETERIZATION N/A 12/10/2018    Procedure: Left ventriculography;  Surgeon: Laquita Jessica MD;  Location:  [facility] CATH INVASIVE LOCATION;  Service: Cardiology   • CATARACT EXTRACTION Bilateral    • CATARACT EXTRACTION     • COLONOSCOPY     • ENDOSCOPY N/A 6/27/2018    Procedure: ESOPHAGOGASTRODUODENOSCOPY with  biopsies;  Surgeon: Rupa Hays MD;  Location:  [facility] ENDOSCOPY;  Service: Gastroenterology   • ENDOSCOPY N/A 9/18/2018    Procedure: ESOPHAGOGASTRODUODENOSCOPY with biopsies;  Surgeon: Rupa Hays MD;  [facility];  Service: Gastroenterology   • ENDOSCOPY N/A 12/7/2018    Procedure: ESOPHAGOGASTRODUODENOSCOPY JWITH BIOPSIES;  Surgeon: Laureano Rehman MD;  Location:  [facility] OR;  Service: Gastroenterology   • EPIDURAL BLOCK     • GLAUCOMA SURGERY     • HYSTERECTOMY     • LUMBAR FUSION N/A 4/12/2021    Procedure: Lumbar 4-lumbar 5 laminectomy and fusion with local bone graft;  Surgeon: Raul Cantu MD;  Location: [facility] MAIN OR;  Service: Orthopedic Spine;  Laterality: N/A;   • SHOULDER SURGERY Right     RUPTURED BICEP TENDON         FAMILY HISTORY  Family History   Problem Relation Age of Onset   • Breast cancer Mother    • Breast cancer Maternal Aunt    • Colon polyps Son    • Colon cancer Neg Hx    • Malig Hyperthermia Neg Hx          SOCIAL HISTORY  Social History     Occupational History   • Not on file   Tobacco Use   • Smoking status: Former Smoker     Packs/day: 3.00     Years: 44.00     Pack years: 132.00     Types: Cigarettes     Quit date: 12/20/2010     Years since quitting: 10.6   • Smokeless tobacco: Never Used   Vaping Use   • Vaping Use: Never used   Substance and Sexual Activity   • Alcohol use: Yes     Comment: Rare   • Drug use: No   • Sexual activity: Defer         LAST RESULTS  See also labs reviewed above.   Admission on 04/12/2021, Discharged on 04/14/2021   Component Date Value Ref Range Status   • ABO Type 04/12/2021 O   Final   • RH type 04/12/2021 Positive   Final   • Antibody Screen 04/12/2021 Negative   Final   • T&S Expiration Date 04/12/2021 4/15/2021 11:59:59 PM   Final   • Hemoglobin 04/12/2021 12.6  12.0 - 15.9 g/dL Final   • Hematocrit 04/12/2021 36.8  34.0 - 46.6 % Final   • Hemoglobin 04/13/2021 10.2* 12.0 - 15.9 g/dL Final   • Hematocrit  "04/13/2021 29.1* 34.0 - 46.6 % Final   • Glucose 04/13/2021 127* 65 - 99 mg/dL Final   • BUN 04/13/2021 14  8 - 23 mg/dL Final   • Creatinine 04/13/2021 0.94  0.57 - 1.00 mg/dL Final   • Sodium 04/13/2021 138  136 - 145 mmol/L Final   • Potassium 04/13/2021 4.3  3.5 - 5.2 mmol/L Final   • Chloride 04/13/2021 103  98 - 107 mmol/L Final   • CO2 04/13/2021 26.8  22.0 - 29.0 mmol/L Final   • Calcium 04/13/2021 8.2* 8.6 - 10.5 mg/dL Final   • eGFR Non African Amer 04/13/2021 59* >60 mL/min/1.73 Final   • BUN/Creatinine Ratio 04/13/2021 14.9  7.0 - 25.0 Final   • Anion Gap 04/13/2021 8.2  5.0 - 15.0 mmol/L Final   • Hemoglobin 04/14/2021 9.5* 12.0 - 15.9 g/dL Final   • Hematocrit 04/14/2021 27.8* 34.0 - 46.6 % Final     No results found.    PHYSICAL EXAM  Debilities/Disabilities Identified: None  Emotional Behavior: Appropriate  65 y.o. female  Wt Readings from Last 3 Encounters:   08/12/21 57.9 kg (127 lb 9.6 oz)   06/23/21 57.2 kg (126 lb)   04/27/21 57.5 kg (126 lb 12.2 oz)     Ht Readings from Last 1 Encounters:   08/12/21 163.8 cm (64.5\")     Body mass index is 21.56 kg/m².  Vitals:    08/12/21 0927   BP: 144/82   BP Location: Left arm   Patient Position: Sitting   Cuff Size: Adult   Weight: 57.9 kg (127 lb 9.6 oz)   Height: 163.8 cm (64.5\")     Vital signs reviewed.          Physical Exam  Vitals and nursing note reviewed.   Constitutional:       Appearance: She is well-developed.   HENT:      Head: Normocephalic and atraumatic.   Eyes:      Conjunctiva/sclera: Conjunctivae normal.      Pupils: Pupils are equal, round, and reactive to light.   Cardiovascular:      Rate and Rhythm: Normal rate and regular rhythm.   Pulmonary:      Effort: Pulmonary effort is normal.      Breath sounds: Normal breath sounds.   Abdominal:      General: Bowel sounds are normal. There is no distension.      Palpations: Abdomen is soft.      Tenderness: There is no abdominal tenderness.      Comments: nontender on exam   Musculoskeletal:  "        General: Normal range of motion.      Cervical back: Normal range of motion and neck supple.   Lymphadenopathy:      Cervical: No cervical adenopathy.   Skin:     General: Skin is warm and dry.   Neurological:      Mental Status: She is alert and oriented to person, place, and time.   Psychiatric:         Behavior: Behavior normal.           CLINICAL DATA REVIEWED   reviewed previous lab results and integrated with today's visit, reviewed notes from other physicians and/or last GI encounter, reviewed previous endoscopy results and available photos, reviewed surgical pathology results from previous biopsies      ASSESSMENT  Diagnoses and all orders for this visit:    Encounter for screening for malignant neoplasm of colon  -     Case Request; Standing  -     Follow Anesthesia Guidelines / Protocol; Future  -     Obtain informed consent; Standing  -     Verify bowel prep was successful; Standing  -     Give tap water enema if bowel prep was insufficient; Standing  -     Case Request    Gastroesophageal reflux disease with esophagitis without hemorrhage  -     famotidine (PEPCID) 40 MG tablet; Take 1 tablet by mouth 2 (Two) Times a Day. With lunch and at bedtime  -     famotidine (PEPCID) 40 MG tablet; Take 1 tablet by mouth 2 (Two) Times a Day. With lunch and at bedtime    Personal history of colonic polyps  -     Case Request; Standing  -     Follow Anesthesia Guidelines / Protocol; Future  -     Obtain informed consent; Standing  -     Verify bowel prep was successful; Standing  -     Give tap water enema if bowel prep was insufficient; Standing  -     Case Request    Iron deficiency anemia due to chronic blood loss    Belching symptom  -     famotidine (PEPCID) 40 MG tablet; Take 1 tablet by mouth 2 (Two) Times a Day. With lunch and at bedtime  -     famotidine (PEPCID) 40 MG tablet; Take 1 tablet by mouth 2 (Two) Times a Day. With lunch and at bedtime          PLAN  Return in about 2 months (around  "10/12/2021).     Since we got her last colon records, they show that she is due for her repeat colon with last in 2016 and 3 polyps.     Colonoscopy when ok with Dr. Cantu and ask about if she needs an antibiotic with procedure.     Sent in 90 day rx for famotidine to mail order and 30 day rx to [facility].     Discussed the importance of taking Pantoprazole/Protonix, 40mg twice daily before breakfast and dinner permanently due to known risk with long term acid reflux of Crowell's esophagus/esophageal cancer., In addition, take famotidine (Pepcid) 40mg twice a day., You have also been prescribed Sucralfate/Carafate.  Take 1gm tablet (you may crush, cut, dissolve or swallow whole with a big drink of water) one time a day at least 30 min after your other medication and you can take it right before bedtime.      Low Acid Diet Instructions:   Don't eat late, don't eat fried or spicy, and don't eat too much at one time. Avoid alcohol and  tomato based products like pizza, lasagna, spaghetti.  If you want to have these take an over the counter Pepcid or TUMS immediately before you eat them.  Do not eat within 3-4 hrs of bedtime. Limit caffeine and carbonated beverages, if you must have them do not have later in the day.  If reflux is severe elevate the head of the bed. You may also use TUMS, maalox, or mylanta for breakthrough heartburn.    Take a daily probiotic (something with a billion cultures and more different strains is better, examples like \"Probiotic 10\" or probiotic gummies) for your gut as well.  AVOID taking NSAIDS (like ibuprofen, Aleve, Motrin, naproxen, meloxicam, etc) as much as possible and use acetaminophen (Tylenol) instead.    Drink lots of water, eat a high fiber diet with 25-30gm a day(check the fiber content in the foods you eat.  Protein bars with 15gm of fiber in each bar are a great supplement daily), and get regular exercise. Use over the counter simethicone xtra strength gas x (125-180mg) 2 twice " a day as needed for gas.     High Fiber Food suggestions:  Beans, nuts, vegetables, whole grains, flax seed and tawnya seeds (which can be stirred into other food) are high in fiber.    Grewal Protein bars from [facility] = 10gm of fiber in each bar (also gluten free)  Quest Protein bars from grocery stores [employer], [employer], [employer]= 15gm of fiber each (also gluten free)  Fiber One bars from grocery stores = 5-6gm of fiber each  Kelloggs Bran Buds cereal 1/2 cup = 17gm of fiber  [employer] brand low sugar Craisins = 13gm of fiber per serving  Melalueca Fiberwise powder found on [employer]=15gm fiber per serving (2 scoops)  CarbBalance tortillas= 15gm of fiber each  Natural Ovens Keto-Friendly Bread found at [facility]=12gm fiber per serving  No Dave vegan bar at [facility]=15gm fiber per serving  Aunt Millies Live Light bread from [facility]=7gm in 2 slices with 80 dave.     I have discussed the above plan with the patient.  They verbalize understanding and are in agreement with the plan.  They have been advised to contact the office for any questions, concerns, or changes related to their health.    30 min spent with patient today >50% spent counseling about natural history and expected course of assessed complaint and reviewed treatment options that have been tried and not tried and those currently available. Questions answered.

## 2021-08-18 ENCOUNTER — HOSPITAL ENCOUNTER (OUTPATIENT)
Dept: PHYSICAL THERAPY | Facility: HOSPITAL | Age: 66
Setting detail: THERAPIES SERIES
Discharge: HOME | End: 2021-08-18

## 2021-08-18 DIAGNOSIS — M54.50 LUMBAR SPINE PAIN: ICD-10-CM

## 2021-08-18 DIAGNOSIS — M43.16 SPONDYLOLISTHESIS OF LUMBAR REGION: ICD-10-CM

## 2021-08-18 DIAGNOSIS — M48.062 SPINAL STENOSIS OF LUMBAR REGION WITH NEUROGENIC CLAUDICATION: Primary | ICD-10-CM

## 2021-08-18 PROCEDURE — 97110 THERAPEUTIC EXERCISES: CPT | Performed by: PHYSICAL THERAPIST

## 2021-08-18 NOTE — THERAPY TREATMENT NOTE
Outpatient Physical Therapy Ortho Treatment Note   Salima Kate     Patient Name: Lila Pabon  : 1955  MRN: [id number]  Today's Date: 2021      Visit Date: 2021    Visit Dx:    ICD-10-CM ICD-9-CM   1. Spinal stenosis of lumbar region with neurogenic claudication  M48.062 724.03   2. Spondylolisthesis of lumbar region  M43.16 738.4   3. Lumbar spine pain  M54.5 724.2       Patient Active Problem List   Diagnosis   • Esophagitis   • Stress-induced cardiomyopathy   • Type 2 myocardial infarction (CMS/HCC)   • Gastroesophageal reflux disease with esophagitis   • Adenomatous polyp of colon   • Personal history of colonic polyps   • Candidal esophagitis (CMS/HCC)   • Iron deficiency anemia due to chronic blood loss   • Spinal stenosis, lumbar region, with neurogenic claudication   • Spinal stenosis, lumbar region with neurogenic claudication        Past Medical History:   Diagnosis Date   • Anemia     IRON SUPPLEMENTS   • Arthritis    • Colon polyp    • Constipation    • DDD (degenerative disc disease), lumbar    • Environmental allergies    • GERD (gastroesophageal reflux disease)    • Hypertension    • Insomnia    • Lumbar disc herniation    • Lumbar stenosis    • Numbness and tingling of left lower extremity    • Poor balance    • Stress-induced cardiomyopathy 1/15/2019   • Type 2 myocardial infarction (CMS/HCC) 1/15/2019        Past Surgical History:   Procedure Laterality Date   • CARDIAC CATHETERIZATION N/A 2018    Procedure: Left Heart Cath and Cors;  Surgeon: Suhas Almonte MD;  Location: [facility] INVASIVE LOCATION;  Service: Cardiovascular   • CARDIAC CATHETERIZATION N/A 2018    Procedure: Left ventriculography;  Surgeon: Suhas Almonte MD;  Location: [facility] CATH INVASIVE LOCATION;  Service: Cardiovascular   • CARDIAC CATHETERIZATION N/A 12/10/2018    Procedure: Right and Left Heart Cath;  Surgeon: Laquita Jessica MD;  Location: [facility] CATH INVASIVE LOCATION;   Service: Cardiology   • CARDIAC CATHETERIZATION N/A 12/10/2018    Procedure: Coronary angiography;  Surgeon: Laquita Jessica MD;  Location:  [facility] CATH INVASIVE LOCATION;  Service: Cardiology   • CARDIAC CATHETERIZATION N/A 12/10/2018    Procedure: Left ventriculography;  Surgeon: Laquita Jessica MD;  Location:  [facility] CATH INVASIVE LOCATION;  Service: Cardiology   • CATARACT EXTRACTION Bilateral    • CATARACT EXTRACTION     • COLONOSCOPY     • ENDOSCOPY N/A 6/27/2018    Procedure: ESOPHAGOGASTRODUODENOSCOPY with biopsies;  Surgeon: Rupa Hays MD;  Location:  [facility] ENDOSCOPY;  Service: Gastroenterology   • ENDOSCOPY N/A 9/18/2018    Procedure: ESOPHAGOGASTRODUODENOSCOPY with biopsies;  Surgeon: Rupa Hays MD;  Location:  [facility] ENDOSCOPY;  Service: Gastroenterology   • ENDOSCOPY N/A 12/7/2018    Procedure: ESOPHAGOGASTRODUODENOSCOPY JWITH BIOPSIES;  Surgeon: Laureano Rehman MD;  [facility];  Service: Gastroenterology   • EPIDURAL BLOCK     • GLAUCOMA SURGERY     • HYSTERECTOMY     • LUMBAR FUSION N/A 4/12/2021    Procedure: Lumbar 4-lumbar 5 laminectomy and fusion with local bone graft;  Surgeon: Raul Cantu MD;  Location: [facility] MAIN OR;  Service: Orthopedic Spine;  Laterality: N/A;   • SHOULDER SURGERY Right     RUPTURED BICEP TENDON                       PT Assessment/Plan     Row Name 08/18/21 2230          PT Assessment    Assessment Comments  Pt is doing well with decreasing c/o pain and good tolerance to her exercise progression.  -        PT Plan    PT Plan Comments  Pt is to continue her HEP daily. She has MD follow up on 8/25.  -       User Key  (r) = Recorded By, (t) = Taken By, (c) = Cosigned By    Initials Name Provider Type    Benny Blackwell, PT Physical Therapist            OP Exercises     Row Name 08/18/21 7317             Subjective Comments    Subjective Comments  Pt wants to know when she will be able to bend over. She states her back has not been too  sore with the exercises.  -GC         Exercise 1    Exercise Name 1  Hamstring Stretch-bilateral  -GC      Cueing 1  Verbal;Tactile  -GC      Reps 1  10  -GC      Time 1  10 secs  -GC         Exercise 2    Exercise Name 2  Piriformis stretch-bilateral  -GC      Cueing 2  Verbal;Tactile  -GC      Reps 2  10  -GC      Time 2  10 secs  -GC         Exercise 3    Exercise Name 3  SKTC-bilateral  -GC      Cueing 3  Verbal;Tactile  -GC      Reps 3  10  -GC      Time 3  10 secs  -GC         Exercise 4    Exercise Name 4  LTR-bilateral  -GC      Cueing 4  Verbal;Tactile  -GC      Reps 4  10  -GC      Time 4  10 secs  -GC         Exercise 5    Exercise Name 5  PPT with LE marching  -GC      Cueing 5  Verbal;Tactile  -GC      Reps 5  20  -GC      Time 5  10#  -GC         Exercise 6    Exercise Name 6  PPT with LE EXT  -GC      Cueing 6  Verbal;Tactile  -GC      Reps 6  20  -GC      Time 6  10#  -GC         Exercise 7    Exercise Name 7  Prone hip EXT-alternately  -GC      Cueing 7  Verbal;Tactile  -GC      Reps 7  30  -GC      Time 7  over 1 pillow  -GC         Exercise 8    Exercise Name 8  Bridge with theraband  -GC      Cueing 8  Verbal;Tactile  -GC      Reps 8  25  -GC      Time 8  silver  -GC         Exercise 9    Exercise Name 9  Supine clam shells vs theraband  -GC      Cueing 9  Verbal;Tactile  -GC      Reps 9  30 ea  -GC      Time 9  silver  -GC         Exercise 10    Exercise Name 10  Prone chest raise  -GC      Cueing 10  Verbal;Tactile  -GC      Reps 10  25  -GC      Time 10  over 1 pillow  -GC        User Key  (r) = Recorded By, (t) = Taken By, (c) = Cosigned By    Initials Name Provider Type     Benny Knight, PT Physical Therapist                                          Time Calculation:   Start Time: 0755  Stop Time: 0846  Time Calculation (min): 51 min  Therapy Charges for Today     Code Description Service Date Service Provider Modifiers Qty    [id number]  PT THER PROC EA 15 MIN 8/18/2021 Antonia  Benny, PT GP 2                    Benny Knight, PT  8/18/2021

## 2021-08-25 ENCOUNTER — OFFICE VISIT (OUTPATIENT)
Dept: ORTHOPEDIC SURGERY | Facility: CLINIC | Age: 66
End: 2021-08-25

## 2021-08-25 VITALS — TEMPERATURE: 98 F | WEIGHT: 127 LBS | BODY MASS INDEX: 21.16 KG/M2 | HEIGHT: 65 IN

## 2021-08-25 DIAGNOSIS — M43.16 SPONDYLOLISTHESIS OF LUMBAR REGION: Primary | ICD-10-CM

## 2021-08-25 DIAGNOSIS — M48.062 SPINAL STENOSIS OF LUMBAR REGION WITH NEUROGENIC CLAUDICATION: ICD-10-CM

## 2021-08-25 PROCEDURE — 72100 X-RAY EXAM L-S SPINE 2/3 VWS: CPT | Performed by: ORTHOPAEDIC SURGERY

## 2021-08-25 PROCEDURE — 99213 OFFICE O/P EST LOW 20 MIN: CPT | Performed by: ORTHOPAEDIC SURGERY

## 2021-08-25 RX ORDER — SUCRALFATE 1 G/1
TABLET ORAL
COMMUNITY
Start: 2021-08-12 | End: 2022-03-31 | Stop reason: SDUPTHER

## 2021-08-25 NOTE — PROGRESS NOTES
She is 4 months out from L4-5 laminectomy and fusion with local bone graft.  No leg pain some stiffness but no back pain.  She just started therapy.  Good strength in the legs and her gait is unremarkable.  X-rays show good position of the graft and further healing since prior films.  Doing quite well I will discharge her and see her back as needed.  I answered many questions.

## 2021-08-26 ENCOUNTER — HOSPITAL ENCOUNTER (OUTPATIENT)
Dept: PHYSICAL THERAPY | Facility: HOSPITAL | Age: 66
Setting detail: THERAPIES SERIES
Discharge: HOME | End: 2021-08-26

## 2021-08-26 DIAGNOSIS — M54.50 LUMBAR SPINE PAIN: ICD-10-CM

## 2021-08-26 DIAGNOSIS — M48.062 SPINAL STENOSIS OF LUMBAR REGION WITH NEUROGENIC CLAUDICATION: Primary | ICD-10-CM

## 2021-08-26 DIAGNOSIS — M43.16 SPONDYLOLISTHESIS OF LUMBAR REGION: ICD-10-CM

## 2021-08-26 PROCEDURE — 97110 THERAPEUTIC EXERCISES: CPT | Performed by: PHYSICAL THERAPIST

## 2021-08-26 NOTE — THERAPY TREATMENT NOTE
Outpatient Physical Therapy Ortho Treatment Note   Salima Kate     Patient Name: Lila Pabon  : 1955  MRN: [id number]  Today's Date: 2021      Visit Date: 2021    Visit Dx:    ICD-10-CM ICD-9-CM   1. Spinal stenosis of lumbar region with neurogenic claudication  M48.062 724.03   2. Spondylolisthesis of lumbar region  M43.16 738.4   3. Lumbar spine pain  M54.5 724.2       Patient Active Problem List   Diagnosis   • Esophagitis   • Stress-induced cardiomyopathy   • Type 2 myocardial infarction (CMS/HCC)   • Gastroesophageal reflux disease with esophagitis   • Adenomatous polyp of colon   • Personal history of colonic polyps   • Candidal esophagitis (CMS/HCC)   • Iron deficiency anemia due to chronic blood loss   • Spinal stenosis, lumbar region, with neurogenic claudication   • Spinal stenosis, lumbar region with neurogenic claudication        Past Medical History:   Diagnosis Date   • Anemia     IRON SUPPLEMENTS   • Arthritis    • Colon polyp    • Constipation    • DDD (degenerative disc disease), lumbar    • Environmental allergies    • GERD (gastroesophageal reflux disease)    • Hypertension    • Insomnia    • Lumbar disc herniation    • Lumbar stenosis    • Numbness and tingling of left lower extremity    • Poor balance    • Stress-induced cardiomyopathy 1/15/2019   • Type 2 myocardial infarction (CMS/HCC) 1/15/2019        Past Surgical History:   Procedure Laterality Date   • CARDIAC CATHETERIZATION N/A 2018    Procedure: Left Heart Cath and Cors;  Surgeon: Suhas Almonte MD;  Location: [facility] INVASIVE LOCATION;  Service: Cardiovascular   • CARDIAC CATHETERIZATION N/A 2018    Procedure: Left ventriculography;  Surgeon: Suhas Almonte MD;  Location: [facility] CATH INVASIVE LOCATION;  Service: Cardiovascular   • CARDIAC CATHETERIZATION N/A 12/10/2018    Procedure: Right and Left Heart Cath;  Surgeon: Laquita Jessica MD;  Location: [facility] CATH INVASIVE LOCATION;   Service: Cardiology   • CARDIAC CATHETERIZATION N/A 12/10/2018    Procedure: Coronary angiography;  Surgeon: Laquita Jessica MD;  Location:  [facility] CATH INVASIVE LOCATION;  Service: Cardiology   • CARDIAC CATHETERIZATION N/A 12/10/2018    Procedure: Left ventriculography;  Surgeon: Laquita Jessica MD;  Location:  [facility] CATH INVASIVE LOCATION;  Service: Cardiology   • CATARACT EXTRACTION Bilateral    • CATARACT EXTRACTION     • COLONOSCOPY     • ENDOSCOPY N/A 6/27/2018    Procedure: ESOPHAGOGASTRODUODENOSCOPY with biopsies;  Surgeon: Rupa Hays MD;  Location:  [facility] ENDOSCOPY;  Service: Gastroenterology   • ENDOSCOPY N/A 9/18/2018    Procedure: ESOPHAGOGASTRODUODENOSCOPY with biopsies;  Surgeon: Rupa Hays MD;  Location:  [facility] ENDOSCOPY;  Service: Gastroenterology   • ENDOSCOPY N/A 12/7/2018    Procedure: ESOPHAGOGASTRODUODENOSCOPY JWITH BIOPSIES;  Surgeon: Laureano Rehman MD;  Location: [facility] OR;  Service: Gastroenterology   • EPIDURAL BLOCK     • GLAUCOMA SURGERY     • HYSTERECTOMY     • LUMBAR FUSION N/A 4/12/2021    Procedure: Lumbar 4-lumbar 5 laminectomy and fusion with local bone graft;  Surgeon: Raul Cantu MD;  Location: [facility] MAIN OR;  Service: Orthopedic Spine;  Laterality: N/A;   • SHOULDER SURGERY Right     RUPTURED BICEP TENDON                       PT Assessment/Plan     Row Name 08/26/21 3378          PT Assessment    Assessment Comments  Pt is doing very well with good pain relief, improved functional mobility, and increased ROM and strength. Feel she is nearing the end of her therapy needs.  -GC        PT Plan    PT Plan Comments  Pt is to continue her HEP daily. Will re-ck again in 2 weeks to update progess. Will wean from therapy.  -GC       User Key  (r) = Recorded By, (t) = Taken By, (c) = Cosigned By    Initials Name Provider Type    Benny Blackwell, PT Physical Therapist            OP Exercises     Row Name 08/26/21 9429             Subjective Comments     Subjective Comments  Pt states her back is feeling pretty good. She says Dr. Cantu has released her and she can start weaning herself from the brace.  -GC         Exercise 1    Exercise Name 1  Hamstring Stretch-bilateral  -GC      Cueing 1  Verbal;Tactile  -GC      Reps 1  10  -GC      Time 1  10 secs  -GC         Exercise 2    Exercise Name 2  Piriformis stretch-bilateral  -GC      Cueing 2  Verbal;Tactile  -GC      Reps 2  10  -GC      Time 2  10 secs  -GC         Exercise 3    Exercise Name 3  SKTC-bilateral  -GC      Cueing 3  Verbal;Tactile  -GC      Reps 3  10  -GC      Time 3  10 secs  -GC         Exercise 4    Exercise Name 4  LTR-bilateral  -GC      Cueing 4  Verbal;Tactile  -GC      Reps 4  10  -GC      Time 4  10 secs  -GC         Exercise 5    Exercise Name 5  PPT with LE marching  -GC      Cueing 5  Verbal;Tactile  -GC      Reps 5  40  -GC      Time 5  10#  -GC         Exercise 6    Exercise Name 6  PPT with LE EXT  -GC      Cueing 6  Verbal;Tactile  -GC      Reps 6  40  -GC      Time 6  10#  -GC         Exercise 7    Exercise Name 7  Alternate prone UE/LE lifts   -GC      Cueing 7  Verbal;Tactile  -GC      Reps 7  30  -GC      Time 7  over 1 pillow  -GC         Exercise 8    Exercise Name 8  Bridge with theraband  -GC      Cueing 8  Verbal;Tactile  -GC      Reps 8  25  -GC      Time 8  gold  -GC         Exercise 9    Exercise Name 9  Supine clam shells vs theraband  -GC      Cueing 9  Verbal;Tactile  -GC      Reps 9  30 ea  -GC      Time 9  gold  -GC         Exercise 10    Exercise Name 10  Prone chest raise  -GC      Cueing 10  Verbal;Tactile  -GC      Reps 10  25  -GC      Time 10  over 1 pillow  -GC        User Key  (r) = Recorded By, (t) = Taken By, (c) = Cosigned By    Initials Name Provider Type    Benny Blackwell PT Physical Therapist                                          Time Calculation:   Start Time: 0730  Stop Time: 0822  Time Calculation (min): 52 min  Therapy Charges for Today      Code Description Service Date Service Provider Modifiers Qty    32707660554 HC PT THER PROC EA 15 MIN 8/26/2021 Benny Knight, PT GP 2                    Benny Knight, PT  8/26/2021

## 2021-09-07 DIAGNOSIS — R14.2 BELCHING SYMPTOM: ICD-10-CM

## 2021-09-07 DIAGNOSIS — K21.00 GASTROESOPHAGEAL REFLUX DISEASE WITH ESOPHAGITIS WITHOUT HEMORRHAGE: ICD-10-CM

## 2021-09-07 RX ORDER — FAMOTIDINE 40 MG/1
TABLET, FILM COATED ORAL
Qty: 180 TABLET | Refills: 3 | Status: SHIPPED | OUTPATIENT
Start: 2021-09-07 | End: 2022-06-30

## 2021-09-29 PROBLEM — Z12.11 ENCOUNTER FOR SCREENING FOR MALIGNANT NEOPLASM OF COLON: Status: ACTIVE | Noted: 2021-09-29

## 2021-12-10 ENCOUNTER — TRANSCRIBE ORDERS (OUTPATIENT)
Dept: ADMINISTRATIVE | Facility: HOSPITAL | Age: 66
End: 2021-12-10

## 2021-12-10 DIAGNOSIS — Z12.31 BREAST CANCER SCREENING BY MAMMOGRAM: Primary | ICD-10-CM

## 2022-01-04 ENCOUNTER — HOSPITAL ENCOUNTER (OUTPATIENT)
Dept: MAMMOGRAPHY | Facility: HOSPITAL | Age: 67
Discharge: HOME | End: 2022-01-04
Admitting: FAMILY MEDICINE

## 2022-01-04 DIAGNOSIS — Z12.31 BREAST CANCER SCREENING BY MAMMOGRAM: ICD-10-CM

## 2022-01-04 PROCEDURE — 77063 BREAST TOMOSYNTHESIS BI: CPT

## 2022-01-04 PROCEDURE — 77067 SCR MAMMO BI INCL CAD: CPT

## 2022-02-23 ENCOUNTER — APPOINTMENT (OUTPATIENT)
Dept: LAB | Facility: HOSPITAL | Age: 67
End: 2022-02-23

## 2022-02-24 ENCOUNTER — ANESTHESIA EVENT (OUTPATIENT)
Dept: PERIOP | Facility: HOSPITAL | Age: 67
End: 2022-02-24

## 2022-02-25 ENCOUNTER — ANESTHESIA (OUTPATIENT)
Dept: PERIOP | Facility: HOSPITAL | Age: 67
End: 2022-02-25

## 2022-02-25 ENCOUNTER — HOSPITAL ENCOUNTER (OUTPATIENT)
Facility: HOSPITAL | Age: 67
Setting detail: HOSPITAL OUTPATIENT SURGERY
Discharge: HOME | End: 2022-02-25
Attending: INTERNAL MEDICINE | Admitting: INTERNAL MEDICINE

## 2022-02-25 VITALS
SYSTOLIC BLOOD PRESSURE: 156 MMHG | TEMPERATURE: 98.1 F | BODY MASS INDEX: 20.65 KG/M2 | HEART RATE: 70 BPM | OXYGEN SATURATION: 97 % | WEIGHT: 122.2 LBS | RESPIRATION RATE: 15 BRPM | DIASTOLIC BLOOD PRESSURE: 91 MMHG

## 2022-02-25 DIAGNOSIS — Z12.11 ENCOUNTER FOR SCREENING FOR MALIGNANT NEOPLASM OF COLON: ICD-10-CM

## 2022-02-25 DIAGNOSIS — Z86.010 PERSONAL HISTORY OF COLONIC POLYPS: ICD-10-CM

## 2022-02-25 PROCEDURE — 45385 COLONOSCOPY W/LESION REMOVAL: CPT | Performed by: INTERNAL MEDICINE

## 2022-02-25 PROCEDURE — 25010000002 PROPOFOL 10 MG/ML EMULSION: Performed by: NURSE ANESTHETIST, CERTIFIED REGISTERED

## 2022-02-25 PROCEDURE — 88305 TISSUE EXAM BY PATHOLOGIST: CPT | Performed by: INTERNAL MEDICINE

## 2022-02-25 RX ORDER — ONDANSETRON 2 MG/ML
4 INJECTION INTRAMUSCULAR; INTRAVENOUS ONCE AS NEEDED
Status: DISCONTINUED | OUTPATIENT
Start: 2022-02-25 | End: 2022-02-25 | Stop reason: HOSPADM

## 2022-02-25 RX ORDER — SODIUM CHLORIDE 0.9 % (FLUSH) 0.9 %
10 SYRINGE (ML) INJECTION AS NEEDED
Status: DISCONTINUED | OUTPATIENT
Start: 2022-02-25 | End: 2022-02-25 | Stop reason: HOSPADM

## 2022-02-25 RX ORDER — SODIUM CHLORIDE 9 MG/ML
40 INJECTION, SOLUTION INTRAVENOUS AS NEEDED
Status: DISCONTINUED | OUTPATIENT
Start: 2022-02-25 | End: 2022-02-25 | Stop reason: HOSPADM

## 2022-02-25 RX ORDER — SODIUM CHLORIDE, SODIUM LACTATE, POTASSIUM CHLORIDE, CALCIUM CHLORIDE 600; 310; 30; 20 MG/100ML; MG/100ML; MG/100ML; MG/100ML
100 INJECTION, SOLUTION INTRAVENOUS CONTINUOUS
Status: DISCONTINUED | OUTPATIENT
Start: 2022-02-25 | End: 2022-02-25 | Stop reason: HOSPADM

## 2022-02-25 RX ORDER — SODIUM CHLORIDE 0.9 % (FLUSH) 0.9 %
10 SYRINGE (ML) INJECTION EVERY 12 HOURS SCHEDULED
Status: DISCONTINUED | OUTPATIENT
Start: 2022-02-25 | End: 2022-02-25 | Stop reason: HOSPADM

## 2022-02-25 RX ORDER — SODIUM CHLORIDE, SODIUM LACTATE, POTASSIUM CHLORIDE, CALCIUM CHLORIDE 600; 310; 30; 20 MG/100ML; MG/100ML; MG/100ML; MG/100ML
9 INJECTION, SOLUTION INTRAVENOUS CONTINUOUS PRN
Status: DISCONTINUED | OUTPATIENT
Start: 2022-02-25 | End: 2022-02-25 | Stop reason: HOSPADM

## 2022-02-25 RX ORDER — PROPOFOL 10 MG/ML
VIAL (ML) INTRAVENOUS AS NEEDED
Status: DISCONTINUED | OUTPATIENT
Start: 2022-02-25 | End: 2022-02-25 | Stop reason: SURG

## 2022-02-25 RX ORDER — DIPHENHYDRAMINE HYDROCHLORIDE 50 MG/ML
12.5 INJECTION INTRAMUSCULAR; INTRAVENOUS
Status: DISCONTINUED | OUTPATIENT
Start: 2022-02-25 | End: 2022-02-25 | Stop reason: HOSPADM

## 2022-02-25 RX ORDER — LIDOCAINE HYDROCHLORIDE 20 MG/ML
INJECTION, SOLUTION INFILTRATION; PERINEURAL AS NEEDED
Status: DISCONTINUED | OUTPATIENT
Start: 2022-02-25 | End: 2022-02-25 | Stop reason: SURG

## 2022-02-25 RX ORDER — GLYCOPYRROLATE 0.2 MG/ML
INJECTION INTRAMUSCULAR; INTRAVENOUS AS NEEDED
Status: DISCONTINUED | OUTPATIENT
Start: 2022-02-25 | End: 2022-02-25 | Stop reason: SURG

## 2022-02-25 RX ORDER — LIDOCAINE HYDROCHLORIDE 10 MG/ML
0.5 INJECTION, SOLUTION EPIDURAL; INFILTRATION; INTRACAUDAL; PERINEURAL ONCE AS NEEDED
Status: DISCONTINUED | OUTPATIENT
Start: 2022-02-25 | End: 2022-02-25 | Stop reason: HOSPADM

## 2022-02-25 RX ADMIN — PROPOFOL 50 MG: 10 INJECTION, EMULSION INTRAVENOUS at 11:56

## 2022-02-25 RX ADMIN — SODIUM CHLORIDE, POTASSIUM CHLORIDE, SODIUM LACTATE AND CALCIUM CHLORIDE 9 ML/HR: 600; 310; 30; 20 INJECTION, SOLUTION INTRAVENOUS at 10:49

## 2022-02-25 RX ADMIN — PROPOFOL 50 MG: 10 INJECTION, EMULSION INTRAVENOUS at 12:03

## 2022-02-25 RX ADMIN — PROPOFOL 50 MG: 10 INJECTION, EMULSION INTRAVENOUS at 11:53

## 2022-02-25 RX ADMIN — GLYCOPYRROLATE 0.2 MCG: 0.2 INJECTION INTRAMUSCULAR; INTRAVENOUS at 11:50

## 2022-02-25 RX ADMIN — PROPOFOL 50 MG: 10 INJECTION, EMULSION INTRAVENOUS at 11:50

## 2022-02-25 RX ADMIN — PROPOFOL 50 MG: 10 INJECTION, EMULSION INTRAVENOUS at 11:45

## 2022-02-25 RX ADMIN — LIDOCAINE HYDROCHLORIDE 80 MG: 20 INJECTION, SOLUTION INFILTRATION; PERINEURAL at 11:45

## 2022-02-25 RX ADMIN — PROPOFOL 50 MG: 10 INJECTION, EMULSION INTRAVENOUS at 11:59

## 2022-02-25 NOTE — ANESTHESIA PREPROCEDURE EVALUATION
Anesthesia Evaluation     Patient summary reviewed and Nursing notes reviewed   no history of anesthetic complications:  NPO Solid Status: > 8 hours  NPO Liquid Status: > 8 hours           Airway   Mallampati: II  TM distance: >3 FB  Neck ROM: full  No difficulty expected  Dental - normal exam   (+) upper dentures and lower dentures    Pulmonary - negative pulmonary ROS    breath sounds clear to auscultation  Cardiovascular   Exercise tolerance: good (4-7 METS)    Rhythm: regular  Rate: normal    (+) hypertension well controlled less than 2 medications,   (-) past MI      Neuro/Psych  (+) headaches,    (-) numbness  GI/Hepatic/Renal/Endo    (+)  GERD well controlled,      Musculoskeletal     (+) back pain ( laminectomy),   Abdominal    Substance History   (+) alcohol use ( occasional),      OB/GYN negative ob/gyn ROS         Other   arthritis,                      Anesthesia Plan    ASA 2     MAC     intravenous induction     Anesthetic plan, all risks, benefits, and alternatives have been provided, discussed and informed consent has been obtained with: patient.  Use of blood products discussed with patient  Consented to blood products.       CODE STATUS:

## 2022-02-25 NOTE — ANESTHESIA POSTPROCEDURE EVALUATION
Patient: Lila Pabon    Procedure Summary     Date: 02/25/22 Room / Location: [facility] ENDOSCOPY 1 /  [facility] OR    Anesthesia Start: 1143 Anesthesia Stop: 1224    Procedure: COLONOSCOPY WITH POLYPECTOMY (N/A ) Diagnosis:       Personal history of colonic polyps      Encounter for screening for malignant neoplasm of colon      Colon polyp      (Personal history of colonic polyps [Z86.010])      (Encounter for screening for malignant neoplasm of colon [Z12.11])    Surgeons: Laureano Rehman MD Provider: Robert Hoover CRNA    Anesthesia Type: MAC ASA Status: 2          Anesthesia Type: MAC    Vitals  Vitals Value Taken Time   /91 02/25/22 1252   Temp     Pulse 70 02/25/22 1252   Resp 15 02/25/22 1252   SpO2 97 % 02/25/22 1252           Post Anesthesia Care and Evaluation    Patient location during evaluation: bedside  Patient participation: complete - patient participated  Level of consciousness: awake and alert  Pain score: 0  Pain management: adequate  Airway patency: patent  Anesthetic complications: No anesthetic complications  PONV Status: none  Cardiovascular status: acceptable  Respiratory status: acceptable  Hydration status: acceptable  No anesthesia care post op

## 2022-03-01 LAB
LAB AP CASE REPORT: NORMAL
LAB AP DIAGNOSIS COMMENT: NORMAL
PATH REPORT.FINAL DX SPEC: NORMAL
PATH REPORT.GROSS SPEC: NORMAL

## 2022-03-31 DIAGNOSIS — K21.00 GASTROESOPHAGEAL REFLUX DISEASE WITH ESOPHAGITIS: ICD-10-CM

## 2022-03-31 RX ORDER — SUCRALFATE 1 G/1
1 TABLET ORAL 4 TIMES DAILY
Qty: 360 TABLET | Refills: 3 | Status: SHIPPED | OUTPATIENT
Start: 2022-03-31 | End: 2022-07-13 | Stop reason: SDUPTHER

## 2022-03-31 RX ORDER — PANTOPRAZOLE SODIUM 40 MG/1
40 TABLET, DELAYED RELEASE ORAL
Qty: 180 TABLET | Refills: 3 | Status: SHIPPED | OUTPATIENT
Start: 2022-03-31 | End: 2022-06-30

## 2022-06-30 ENCOUNTER — OFFICE VISIT (OUTPATIENT)
Dept: GASTROENTEROLOGY | Facility: CLINIC | Age: 67
End: 2022-06-30

## 2022-06-30 ENCOUNTER — TRANSCRIBE ORDERS (OUTPATIENT)
Dept: BONE DENSITY | Facility: HOSPITAL | Age: 67
End: 2022-06-30

## 2022-06-30 ENCOUNTER — TRANSCRIBE ORDERS (OUTPATIENT)
Dept: ADMINISTRATIVE | Facility: HOSPITAL | Age: 67
End: 2022-06-30

## 2022-06-30 VITALS
WEIGHT: 119.4 LBS | DIASTOLIC BLOOD PRESSURE: 80 MMHG | BODY MASS INDEX: 19.89 KG/M2 | SYSTOLIC BLOOD PRESSURE: 110 MMHG | HEIGHT: 65 IN

## 2022-06-30 DIAGNOSIS — D12.6 ADENOMATOUS POLYP OF COLON, UNSPECIFIED PART OF COLON: ICD-10-CM

## 2022-06-30 DIAGNOSIS — Z78.0 MENOPAUSE: Primary | ICD-10-CM

## 2022-06-30 DIAGNOSIS — K21.00 GASTROESOPHAGEAL REFLUX DISEASE WITH ESOPHAGITIS: ICD-10-CM

## 2022-06-30 DIAGNOSIS — K21.00 GASTROESOPHAGEAL REFLUX DISEASE WITH ESOPHAGITIS WITHOUT HEMORRHAGE: Primary | ICD-10-CM

## 2022-06-30 PROCEDURE — 99213 OFFICE O/P EST LOW 20 MIN: CPT | Performed by: INTERNAL MEDICINE

## 2022-06-30 RX ORDER — TERBINAFINE HYDROCHLORIDE 250 MG/1
250 TABLET ORAL DAILY
COMMUNITY

## 2022-06-30 RX ORDER — PANTOPRAZOLE SODIUM 40 MG/1
40 TABLET, DELAYED RELEASE ORAL
Qty: 180 TABLET | Refills: 3 | Status: SHIPPED | OUTPATIENT
Start: 2022-06-30 | End: 2022-07-13 | Stop reason: SDUPTHER

## 2022-06-30 NOTE — PROGRESS NOTES
PATIENT INFORMATION  Lila Pabon       - 1955    CHIEF COMPLAINT  Chief Complaint   Patient presents with   • Heartburn   • Iron deficient anemia       HISTORY OF PRESENT ILLNESS  Here for follow u but has a difficult colon but only one adenoma    Overall is doing well but need refill on her PPI     Has to avoid spicy foods an blames her hospitalizations on that but is better now      REVIEWED PERTINENT RESULTS/ LABS  Lab Results   Component Value Date    CASEREPORT  2022     Surgical Pathology Report                         Case: [id number]                                  Authorizing Provider:  Laureano Rehman        Collected:           2022 12:14 PM                                 MD Silvia                                                                   Ordering Location:     [facility]   Received:            2022 01:19 PM                                 OR                                                                           Pathologist:           Raul Astudillo MD                                                         Specimen:    Large Intestine, Right / Ascending Colon, ascending colon polyps x2                        FINALDX  2022     1. Ascending Colon Polyps x2 Biopsy:    A. Fragments of sessile serrated lesion/polyp (see comment).   B. Fragments of hyperplastic polyp.   C. No glandular dysplasia nor malignancy identified.    jat/viancae        Lab Results   Component Value Date    HGB 9.5 (L) 2021    MCV 88.2 2021     2021    ALT 50 (H) 2018    AST 53 (H) 2018      No results found.    REVIEW OF SYSTEMS  Review of Systems   Constitutional: Negative for activity change, chills, fever and unexpected weight change.   HENT: Positive for trouble swallowing. Negative for congestion.    Eyes: Negative for visual disturbance.   Respiratory: Negative for shortness of breath.    Cardiovascular: Negative for chest  pain and palpitations.   Gastrointestinal: Positive for constipation. Negative for abdominal pain and blood in stool.   Endocrine: Negative for cold intolerance and heat intolerance.   Genitourinary: Negative for hematuria.   Musculoskeletal: Negative for gait problem.   Skin: Negative for color change.   Allergic/Immunologic: Negative for immunocompromised state.   Neurological: Negative for weakness and light-headedness.   Hematological: Negative for adenopathy.   Psychiatric/Behavioral: Negative for sleep disturbance. The patient is not nervous/anxious.          ACTIVE PROBLEMS  Patient Active Problem List    Diagnosis    • Encounter for screening for malignant neoplasm of colon [Z12.11]    • Spinal stenosis, lumbar region, with neurogenic claudication [M48.062]    • Spinal stenosis, lumbar region with neurogenic claudication [M48.062]    • Candidal esophagitis (HCC) [B37.81]    • Iron deficiency anemia due to chronic blood loss [D50.0]    • Personal history of colonic polyps [Z86.010]    • Gastroesophageal reflux disease with esophagitis [K21.00]    • Adenomatous polyp of colon [D12.6]    • Stress-induced cardiomyopathy [I51.81]    • Type 2 myocardial infarction (HCC) [I21.A1]    • Esophagitis [K20.90]          PAST MEDICAL HISTORY  Past Medical History:   Diagnosis Date   • Anemia     IRON SUPPLEMENTS   • Arthritis    • Colon polyp    • Constipation    • DDD (degenerative disc disease), lumbar    • Environmental allergies    • GERD (gastroesophageal reflux disease)    • Hypertension    • Insomnia    • Lumbar disc herniation    • Lumbar stenosis    • Numbness and tingling of left lower extremity    • Poor balance    • Stress-induced cardiomyopathy 1/15/2019   • Type 2 myocardial infarction (HCC) 1/15/2019         SURGICAL HISTORY  Past Surgical History:   Procedure Laterality Date   • CARDIAC CATHETERIZATION N/A 6/26/2018    Procedure: Left Heart Cath and Cors;  Surgeon: Suhas Almonte MD;  Location:   [facility] CATH INVASIVE LOCATION;  Service: Cardiovascular   • CARDIAC CATHETERIZATION N/A 6/26/2018    Procedure: Left ventriculography;  Surgeon: Suhas Almonte MD;  Location[facility] C[facility]VASIVE LOC[facility];  Service: Cardiovascular   • CARDIAC CATHETERIZATION N/A 12/10/2018    Procedure: Right and Left Heart Cath;  Surgeon: Laquita Jessica MD;  Location: [facility] CATH INVASIVE LOCATION;  Service: Cardiology   • CARDIAC CATHETERIZATION N/A 12/10/2018    Procedure: Coronary angiography;  Surgeon: Laquita Jessica MD;  Location: [facility] CATH INVASIVE LOCATION;  Service: Cardiology   • CARDIAC CATHETERIZATION N/A 12/10/2018    Procedure: Left ventriculography;  Surgeon: Laquita Jessica MD;  Location: [facility] CATH INVASIVE LOCATION;  Service: Cardiology   • CATARACT EXTRACTION Bilateral    • CATARACT EXTRACTION     • COLONOSCOPY     • COLONOSCOPY W/ POLYPECTOMY N/A 2/25/2022    Procedure: COLONOSCOPY WITH POLYPECTOMY;  Surgeon: Laureano Rehman MD;  Location: [facility] OR;  Service: Gastroenterology;  Laterality: N/A;  ascending colon polyps x2 (hot snare x2)   • ENDOSCOPY N/A 6/27/2018    Procedure: ESOPHAGOGASTRODUODENOSCOPY with biopsies;  Surgeon: Rupa Hays MD;  Location: [facility] ENDOSCOPY;  Service: Gastroenterology   • ENDOSCOPY N/A 9/18/2018    Procedure: ESOPHAGOGASTRODUODENOSCOPY with biopsies;  Surgeon: Rupa Hays MD;  Location: [facility] ENDOSCOPY;  Service: Gastroenterology   • ENDOSCOPY N/A 12/7/2018    Procedure: ESOPHAGOGASTRODUODENOSCOPY JWITH BIOPSIES;  Surgeon: Laureano Rehman MD;  Location: [facility] OR;  Service: Gastroenterology   • EPIDURAL BLOCK     • GLAUCOMA SURGERY     • HYSTERECTOMY     • LUMBAR FUSION N/A 4/12/2021    Procedure: Lumbar 4-lumbar 5 laminectomy and fusion with local bone graft;  Surgeon: aRul Cantu MD;  Location: [facility] MAIN OR;  Service: Orthopedic Spine;  Laterality: N/A;   • SHOULDER SURGERY Right     RUPTURED BICEP TENDON         FAMILY  HISTORY  Family History   Problem Relation Age of Onset   • Breast cancer Mother    • Breast cancer Maternal Aunt    • Colon polyps Son    • Colon cancer Neg Hx    • Malig Hyperthermia Neg Hx          SOCIAL HISTORY  Social History     Occupational History   • Not on file   Tobacco Use   • Smoking status: Former Smoker     Packs/day: 3.00     Years: 44.00     Pack years: 132.00     Types: Cigarettes     Quit date: 2010     Years since quittin.5   • Smokeless tobacco: Never Used   Vaping Use   • Vaping Use: Never used   Substance and Sexual Activity   • Alcohol use: Yes     Comment: Rare   • Drug use: No   • Sexual activity: Defer         CURRENT MEDICATIONS    Current Outpatient Medications:   •  aspirin 81 MG EC tablet, Take 81 mg by mouth Daily., Disp: , Rfl:   •  calcium carb-cholecalciferol 600-800 MG-UNIT tablet, Take 1 tablet by mouth Every Night., Disp: , Rfl:   •  docusate sodium (COLACE) 100 MG capsule, Take 100 mg by mouth Every Night., Disp: , Rfl:   •  estrogens, conjugated, (PREMARIN) 0.3 MG tablet, Take 0.3 mg by mouth Every Night. Take daily for 21 days then do not take for 7 days., Disp: , Rfl:   •  ferrous sulfate 140 (45 Fe) MG tablet controlled-release tablet, Take 140 mg by mouth Every Night., Disp: , Rfl:   •  fluticasone (FLONASE) 50 MCG/ACT nasal spray, 2 sprays into the nostril(s) as directed by provider Every Night., Disp: , Rfl:   •  losartan (COZAAR) 25 MG tablet, Take 25 mg by mouth Every Night., Disp: , Rfl:   •  pantoprazole (PROTONIX) 40 MG EC tablet, Take 1 tablet by mouth 2 (Two) Times a Day Before Meals., Disp: 180 tablet, Rfl: 3  •  sucralfate (CARAFATE) 1 g tablet, Take 1 tablet by mouth 4 (Four) Times a Day., Disp: 360 tablet, Rfl: 3  •  terbinafine (lamiSIL) 250 MG tablet, Take 250 mg by mouth Daily., Disp: , Rfl:   •  traZODone (DESYREL) 100 MG tablet, Take 100 mg by mouth Every Night., Disp: , Rfl:     ALLERGIES  Mobic [meloxicam]    VITALS  Vitals:    22 1222  "  BP: 110/80   BP Location: Left arm   Patient Position: Sitting   Cuff Size: Large Adult   Weight: 54.2 kg (119 lb 6.4 oz)   Height: 163.8 cm (64.5\")       PHYSICAL EXAM  Debilities/Disabilities Identified: None  Emotional Behavior: Appropriate  Wt Readings from Last 3 Encounters:   06/30/22 54.2 kg (119 lb 6.4 oz)   02/25/22 55.4 kg (122 lb 3.2 oz)   08/25/21 57.6 kg (127 lb)     Ht Readings from Last 1 Encounters:   06/30/22 163.8 cm (64.5\")     Body mass index is 20.18 kg/m².  Physical Exam  Constitutional:       Appearance: She is well-developed. She is not diaphoretic.   HENT:      Head: Normocephalic and atraumatic.   Eyes:      General: No scleral icterus.     Conjunctiva/sclera: Conjunctivae normal.      Pupils: Pupils are equal, round, and reactive to light.   Neck:      Thyroid: No thyromegaly.   Cardiovascular:      Rate and Rhythm: Normal rate and regular rhythm.      Heart sounds: Normal heart sounds. No murmur heard.    No gallop.   Pulmonary:      Effort: Pulmonary effort is normal.      Breath sounds: Normal breath sounds. No wheezing or rales.   Abdominal:      General: Bowel sounds are normal. There is no distension or abdominal bruit.      Palpations: Abdomen is soft. There is no shifting dullness, fluid wave or mass.      Tenderness: There is no abdominal tenderness. There is no guarding. Negative signs include Mcmahon's sign.      Hernia: There is no hernia in the ventral area.   Musculoskeletal:         General: Normal range of motion.      Cervical back: Normal range of motion and neck supple.   Lymphadenopathy:      Cervical: No cervical adenopathy.   Skin:     General: Skin is warm and dry.      Findings: No erythema or rash.   Neurological:      Mental Status: She is alert and oriented to person, place, and time.   Psychiatric:         Mood and Affect: Mood normal.         Behavior: Behavior normal.         CLINICAL DATA REVIEWED   reviewed previous lab results and integrated with today's " visit, reviewed notes from other physicians and/or last GI encounter, reviewed previous endoscopy results and available photos, reviewed surgical pathology results from previous biopsies    ASSESSMENT  Diagnoses and all orders for this visit:    Gastroesophageal reflux disease with esophagitis without hemorrhage    Adenomatous polyp of colon, unspecified part of colon    Gastroesophageal reflux disease with esophagitis  -     pantoprazole (PROTONIX) 40 MG EC tablet; Take 1 tablet by mouth 2 (Two) Times a Day Before Meals.    Other orders  -     terbinafine (lamiSIL) 250 MG tablet; Take 250 mg by mouth Daily.          PLAN  Return if symptoms worsen or fail to improve.    I have discussed the above plan with the patient.  They verbalize understanding and are in agreement with the plan.  They have been advised to contact the office for any questions, concerns, or changes related to their health.

## 2022-07-01 ENCOUNTER — TELEPHONE (OUTPATIENT)
Dept: GASTROENTEROLOGY | Facility: CLINIC | Age: 67
End: 2022-07-01

## 2022-07-01 NOTE — TELEPHONE ENCOUNTER
Madeline @ Dr. Zhang to fax labs.    ----- Message from Laureano Rehman MD sent at 6/30/2022 12:46 PM EDT -----  [facility]

## 2022-07-13 ENCOUNTER — PATIENT MESSAGE (OUTPATIENT)
Dept: GASTROENTEROLOGY | Facility: CLINIC | Age: 67
End: 2022-07-13

## 2022-07-13 DIAGNOSIS — K21.00 GASTROESOPHAGEAL REFLUX DISEASE WITH ESOPHAGITIS: ICD-10-CM

## 2022-07-13 DIAGNOSIS — K21.00 GASTROESOPHAGEAL REFLUX DISEASE WITH ESOPHAGITIS WITHOUT HEMORRHAGE: Primary | ICD-10-CM

## 2022-07-13 RX ORDER — SUCRALFATE 1 G/1
1 TABLET ORAL 4 TIMES DAILY
Qty: 360 TABLET | Refills: 3 | Status: SHIPPED | OUTPATIENT
Start: 2022-07-13

## 2022-07-13 RX ORDER — PANTOPRAZOLE SODIUM 40 MG/1
40 TABLET, DELAYED RELEASE ORAL
Qty: 180 TABLET | Refills: 3 | Status: SHIPPED | OUTPATIENT
Start: 2022-07-13

## 2022-08-08 ENCOUNTER — TELEPHONE (OUTPATIENT)
Dept: GASTROENTEROLOGY | Facility: CLINIC | Age: 67
End: 2022-08-08

## 2022-08-08 NOTE — TELEPHONE ENCOUNTER
Never received labs requested 7/1/22.  ANNETTE on Dr. Zhang' office VM to please fax most recent labs to include CBC, CMP, Lipids  8/8/22 sergo    ----- Message from Laureano Rehman MD sent at 6/30/2022 12:46 PM EDT -----  Call [facility] office for labs please

## 2022-08-11 ENCOUNTER — TELEPHONE (OUTPATIENT)
Dept: GASTROENTEROLOGY | Facility: CLINIC | Age: 67
End: 2022-08-11

## 2022-08-11 NOTE — TELEPHONE ENCOUNTER
----- Message from Laureano Rehman MD sent at 6/30/2022 12:46 PM EDT -----  Call [facility] office for labs please

## 2022-12-09 ENCOUNTER — APPOINTMENT (OUTPATIENT)
Dept: BONE DENSITY | Facility: HOSPITAL | Age: 67
End: 2022-12-09

## 2022-12-09 ENCOUNTER — TRANSCRIBE ORDERS (OUTPATIENT)
Dept: ADMINISTRATIVE | Facility: HOSPITAL | Age: 67
End: 2022-12-09

## 2022-12-09 DIAGNOSIS — Z78.0 MENOPAUSE: ICD-10-CM

## 2022-12-09 DIAGNOSIS — Z12.31 VISIT FOR SCREENING MAMMOGRAM: Primary | ICD-10-CM

## 2022-12-09 PROCEDURE — 77080 DXA BONE DENSITY AXIAL: CPT

## 2023-01-03 ENCOUNTER — TRANSCRIBE ORDERS (OUTPATIENT)
Dept: ADMINISTRATIVE | Facility: HOSPITAL | Age: 68
End: 2023-01-03
Payer: MEDICARE

## 2023-01-03 DIAGNOSIS — M51.36 DDD (DEGENERATIVE DISC DISEASE), LUMBAR: Primary | ICD-10-CM

## 2023-01-10 ENCOUNTER — HOSPITAL ENCOUNTER (OUTPATIENT)
Dept: MAMMOGRAPHY | Facility: HOSPITAL | Age: 68
Discharge: HOME | End: 2023-01-10
Admitting: FAMILY MEDICINE
Payer: MEDICARE

## 2023-01-10 DIAGNOSIS — Z12.31 VISIT FOR SCREENING MAMMOGRAM: ICD-10-CM

## 2023-01-10 PROCEDURE — 77067 SCR MAMMO BI INCL CAD: CPT

## 2023-01-10 PROCEDURE — 77063 BREAST TOMOSYNTHESIS BI: CPT

## 2023-01-12 ENCOUNTER — HOSPITAL ENCOUNTER (OUTPATIENT)
Dept: MRI IMAGING | Facility: HOSPITAL | Age: 68
Discharge: HOME | End: 2023-01-12
Admitting: FAMILY MEDICINE
Payer: MEDICARE

## 2023-01-12 DIAGNOSIS — M51.36 DDD (DEGENERATIVE DISC DISEASE), LUMBAR: ICD-10-CM

## 2023-01-12 PROCEDURE — 72148 MRI LUMBAR SPINE W/O DYE: CPT

## 2023-03-17 NOTE — TELEPHONE ENCOUNTER
My chart request for refill carafate and protonix     pantoprazole (PROTONIX) 40 MG EC tablet, Take 1 tablet by mouth 2 (Two) Times a Day Before Meals. (Patient taking differently: Take 40 mg by mouth Every Night.), Disp: 180 tablet, Rfl: 3     sucralfate (CARAFATE) 1 g tablet, Take 1 tablet by mouth 4 (Four) Times a Day Before Meals & at Bedtime As Needed (Heartburn)., Disp: 360 tablet, Rfl: 3    Sent refill rx per protocol  
No

## 2023-09-23 DIAGNOSIS — K21.00 GASTROESOPHAGEAL REFLUX DISEASE WITH ESOPHAGITIS: ICD-10-CM

## 2023-09-23 DIAGNOSIS — K21.00 GASTROESOPHAGEAL REFLUX DISEASE WITH ESOPHAGITIS WITHOUT HEMORRHAGE: ICD-10-CM

## 2023-09-25 RX ORDER — PANTOPRAZOLE SODIUM 40 MG/1
40 TABLET, DELAYED RELEASE ORAL
Qty: 180 TABLET | Refills: 3 | OUTPATIENT
Start: 2023-09-25

## 2023-09-25 RX ORDER — SUCRALFATE 1 G/1
1 TABLET ORAL 4 TIMES DAILY
Qty: 360 TABLET | Refills: 3 | OUTPATIENT
Start: 2023-09-25

## 2023-10-31 ENCOUNTER — OFFICE VISIT (OUTPATIENT)
Dept: GASTROENTEROLOGY | Facility: CLINIC | Age: 68
End: 2023-10-31
Payer: MEDICARE

## 2023-10-31 VITALS
BODY MASS INDEX: 22.4 KG/M2 | HEIGHT: 64 IN | DIASTOLIC BLOOD PRESSURE: 78 MMHG | WEIGHT: 131.2 LBS | SYSTOLIC BLOOD PRESSURE: 118 MMHG

## 2023-10-31 DIAGNOSIS — Z86.010 PERSONAL HISTORY OF COLONIC POLYPS: ICD-10-CM

## 2023-10-31 DIAGNOSIS — K21.00 GASTROESOPHAGEAL REFLUX DISEASE WITH ESOPHAGITIS: Primary | ICD-10-CM

## 2023-10-31 RX ORDER — SUCRALFATE 1 G/1
1 TABLET ORAL 4 TIMES DAILY
Qty: 180 TABLET | Refills: 3 | Status: SHIPPED | OUTPATIENT
Start: 2023-10-31

## 2023-10-31 RX ORDER — PANTOPRAZOLE SODIUM 40 MG/1
40 TABLET, DELAYED RELEASE ORAL
Qty: 180 TABLET | Refills: 3 | Status: SHIPPED | OUTPATIENT
Start: 2023-10-31 | End: 2023-10-31 | Stop reason: SDUPTHER

## 2023-10-31 RX ORDER — SUCRALFATE 1 G/1
1 TABLET ORAL 4 TIMES DAILY
Qty: 180 TABLET | Refills: 3 | Status: SHIPPED | OUTPATIENT
Start: 2023-10-31 | End: 2023-10-31 | Stop reason: SDUPTHER

## 2023-10-31 RX ORDER — RISEDRONATE SODIUM 150 MG/1
150 TABLET, FILM COATED ORAL
COMMUNITY

## 2023-10-31 RX ORDER — PANTOPRAZOLE SODIUM 40 MG/1
40 TABLET, DELAYED RELEASE ORAL
Qty: 180 TABLET | Refills: 3 | Status: SHIPPED | OUTPATIENT
Start: 2023-10-31

## 2023-10-31 NOTE — PROGRESS NOTES
PATIENT INFORMATION  Lila Pabon       - 1955    CHIEF COMPLAINT  Chief Complaint   Patient presents with    Follow-up     GERD       HISTORY OF PRESENT ILLNESS    Here today for GERD follow-up    GERD-Managed well on BID omeprazole or sucralfate. No odynophagia, dysphagia, nausea, vomiting, abdominal pain, bloating, belching. No breakthrough symptoms. No NSAIDs or OTC antacids.    2022 Last Colon 1/2 adenomas  2018 Last EGD for dyspepsia gastritis with candida esophagitis, reflux, no barretts or HP      REVIEWED PERTINENT RESULTS/ LABS  Lab Results   Component Value Date    CASEREPORT  2022     Surgical Pathology Report                         Case: [id number]                                  Authorizing Provider:  Laureano Rehman        Collected:           2022 12:14 PM                                 MD Silvia                                                                   Ordering Location:     [facility]   Received:            2022 01:19 PM                                 OR                                                                           Pathologist:           Raul Astudillo MD                                                         Specimen:    Large Intestine, Right / Ascending Colon, ascending colon polyps x2                        FINALDX  2022     1. Ascending Colon Polyps x2 Biopsy:    A. Fragments of sessile serrated lesion/polyp (see comment).   B. Fragments of hyperplastic polyp.   C. No glandular dysplasia nor malignancy identified.    jat/jse        Lab Results   Component Value Date    HGB 9.5 (L) 2021    MCV 88.2 2021     2021    ALT 50 (H) 2018    AST 53 (H) 2018      No results found.    REVIEW OF SYSTEMS  Review of Systems   Constitutional: Negative.    HENT: Negative.     Eyes: Negative.    Respiratory: Negative.     Cardiovascular: Negative.    Gastrointestinal: Negative.     Endocrine: Negative.    Genitourinary: Negative.    Musculoskeletal: Negative.    Skin: Negative.    Allergic/Immunologic: Negative.    Neurological: Negative.    Hematological: Negative.    Psychiatric/Behavioral: Negative.           ACTIVE PROBLEMS  Patient Active Problem List    Diagnosis     Encounter for screening for malignant neoplasm of colon [Z12.11]     Spinal stenosis, lumbar region, with neurogenic claudication [M48.062]     Spinal stenosis, lumbar region with neurogenic claudication [M48.062]     Candidal esophagitis [B37.81]     Iron deficiency anemia due to chronic blood loss [D50.0]     Personal history of colonic polyps [Z86.010]     Gastroesophageal reflux disease with esophagitis [K21.00]     Adenomatous polyp of colon [D12.6]     Stress-induced cardiomyopathy [I51.81]     Type 2 myocardial infarction [I21.A1]     Esophagitis [K20.90]          PAST MEDICAL HISTORY  Past Medical History:   Diagnosis Date    Anemia     IRON SUPPLEMENTS    Arthritis     Colon polyp     Constipation     DDD (degenerative disc disease), lumbar     Environmental allergies     GERD (gastroesophageal reflux disease)     Hypertension     Insomnia     Lumbar disc herniation     Lumbar stenosis     Numbness and tingling of left lower extremity     Poor balance     Stress-induced cardiomyopathy 1/15/2019    Type 2 myocardial infarction 1/15/2019         SURGICAL HISTORY  Past Surgical History:   Procedure Laterality Date    CARDIAC CATHETERIZATION N/A 6/26/2018    Procedure: Left Heart Cath and Cors;  Surgeon: Suhas Almonte MD;  Location:  [facility]VAS[facility];  Service: Cardiovascular    CARDIAC CATHETERIZATION N/A 6/26/2018    Procedure: Left ventriculography;  Surgeon: Suhas Almonte MD;  Location:  [facility];  Service: Cardiovascular    CARDIAC CATHETERIZATION N/A 12/10/2018    Procedure: Right and Left Heart Cath;  Surgeon: Laquita Jessica MD;  Location:  [facility]ATH INVASIVE  LOCATION;  Service: Cardiology    CARDIAC CATHETERIZATION N/A 12/10/2018    Procedure: Coronary angiography;  Surgeon: Laquita Jessica MD;  Location:  [facility];  Service: Cardiology    CARDIAC CATHETERIZATION N/A 12/10/2018    Procedure: Left ventriculography;  Surgeon: Laquita Jessica MD;  Location:  [facility];  Service: Cardiology    CATARACT EXTRACTION Bilateral     CATARACT EXTRACTION      COLONOSCOPY      COLONOSCOPY W/ POLYPECTOMY N/A 2/25/2022    Procedure: COLONOSCOPY WITH POLYPECTOMY;  Surgeon: Laureano Rehman MD;  Location: [facility] OR;  Service: Gastroenterology;  Laterality: N/A;  ascending colon polyps x2 (hot snare x2)    ENDOSCOPY N/A 6/27/2018    Procedure: ESOPHAGOGASTRODUODENOSCOPY with biopsies;  Surgeon: Rupa Hays MD;  Location[facility] ENDOSCOPY;  Service: Gastroenterology    ENDOSCOPY N/A 9/18/2018    Procedure: ESOPHAGOGASTRODUODENOSCOPY with biopsies;  Surgeon: Rupa Hays MD;  Location: [facility] ENDOSCOPY;  Service: Gastroenterology    ENDOSCOPY N/A 12/7/2018    Procedure: ESOPHAGOGASTRODUODENOSCOPY JWITH BIOPSIES;  Surgeon: Laureano Rehman MD;  Location: [facility] OR;  Service: Gastroenterology    EPIDURAL BLOCK      GLAUCOMA SURGERY      HYSTERECTOMY      LUMBAR FUSION N/A 4/12/2021    Procedure: Lumbar 4-lumbar 5 laminectomy and fusion with local bone graft;  Surgeon: Raul Cantu MD;  Location: [facility] MAIN OR;  Service: Orthopedic Spine;  Laterality: N/A;    SHOULDER SURGERY Right     RUPTURED BICEP TENDON         FAMILY HISTORY  Family History   Problem Relation Age of Onset    Breast cancer Mother     Breast cancer Maternal Aunt     Colon polyps Son     Colon cancer Neg Hx     Malig Hyperthermia Neg Hx          SOCIAL HISTORY  Social History     Occupational History    Not on file   Tobacco Use    Smoking status: Former     Packs/day: 3.00     Years: 44.00     Additional pack years: 0.00     Total pack years:  "132.00     Types: Cigarettes     Quit date: 2010     Years since quittin.8    Smokeless tobacco: Never   Vaping Use    Vaping Use: Never used   Substance and Sexual Activity    Alcohol use: Yes     Comment: Rare    Drug use: No    Sexual activity: Defer         CURRENT MEDICATIONS    Current Outpatient Medications:     aspirin 81 MG EC tablet, Take 1 tablet by mouth Daily., Disp: , Rfl:     calcium carb-cholecalciferol 600-800 MG-UNIT tablet, Take 1 tablet by mouth Every Night., Disp: , Rfl:     docusate sodium (COLACE) 100 MG capsule, Take 1 capsule by mouth Every Night., Disp: , Rfl:     estrogens, conjugated, (PREMARIN) 0.3 MG tablet, Take 1 tablet by mouth Every Night. Take daily for 21 days then do not take for 7 days., Disp: , Rfl:     ferrous sulfate 140 (45 Fe) MG tablet controlled-release tablet, Take 1 tablet by mouth Every Night., Disp: , Rfl:     fluticasone (FLONASE) 50 MCG/ACT nasal spray, 2 sprays into the nostril(s) as directed by provider Every Night., Disp: , Rfl:     losartan (COZAAR) 25 MG tablet, Take 1 tablet by mouth Every Night., Disp: , Rfl:     pantoprazole (PROTONIX) 40 MG EC tablet, Take 1 tablet by mouth 2 (Two) Times a Day Before Meals., Disp: 180 tablet, Rfl: 3    risedronate (ACTONEL) 150 MG tablet, Take 1 tablet by mouth Every 30 (Thirty) Days. with water on empty stomach, nothing by mouth or lie down for next 30 minutes., Disp: , Rfl:     sucralfate (CARAFATE) 1 g tablet, Take 1 tablet by mouth 4 (Four) Times a Day., Disp: 180 tablet, Rfl: 3    traZODone (DESYREL) 100 MG tablet, Take 1 tablet by mouth Every Night., Disp: , Rfl:     ALLERGIES  Mobic [meloxicam]    VITALS  Vitals:    10/31/23 0834   BP: 118/78   BP Location: Left arm   Patient Position: Sitting   Cuff Size: Adult   Weight: 59.5 kg (131 lb 3.2 oz)   Height: 163.8 cm (64.49\")       PHYSICAL EXAM  Debilities/Disabilities Identified: None  Emotional Behavior: Appropriate  Wt Readings from Last 3 Encounters: " "  10/31/23 59.5 kg (131 lb 3.2 oz)   01/11/23 55.8 kg (123 lb)   06/30/22 54.2 kg (119 lb 6.4 oz)     Ht Readings from Last 1 Encounters:   10/31/23 163.8 cm (64.49\")     Body mass index is 22.18 kg/m².  Physical Exam  Constitutional:       General: She is not in acute distress.     Appearance: Normal appearance. She is not ill-appearing.   HENT:      Head: Normocephalic and atraumatic.      Mouth/Throat:      Mouth: Mucous membranes are moist.      Pharynx: No posterior oropharyngeal erythema.   Eyes:      General: No scleral icterus.  Cardiovascular:      Rate and Rhythm: Normal rate and regular rhythm.      Heart sounds: Normal heart sounds.   Pulmonary:      Effort: Pulmonary effort is normal.      Breath sounds: Normal breath sounds.   Abdominal:      General: Abdomen is flat. Bowel sounds are normal. There is no distension.      Palpations: Abdomen is soft. There is no mass.      Tenderness: There is no abdominal tenderness. There is no guarding or rebound. Negative signs include Mcmahon's sign.      Hernia: No hernia is present.   Musculoskeletal:      Cervical back: Neck supple.   Skin:     General: Skin is warm.      Capillary Refill: Capillary refill takes less than 2 seconds.   Neurological:      General: No focal deficit present.      Mental Status: She is alert and oriented to person, place, and time.   Psychiatric:         Mood and Affect: Mood normal.         Behavior: Behavior normal.         Thought Content: Thought content normal.         Judgment: Judgment normal.         CLINICAL DATA REVIEWED   reviewed previous lab results and integrated with today's visit, reviewed notes from other physicians and/or last GI encounter, reviewed previous endoscopy results and available photos, reviewed surgical pathology results from previous biopsies    ASSESSMENT  Diagnoses and all orders for this visit:    Personal history of colonic polyps    Gastroesophageal reflux disease with esophagitis  -     " Discontinue: pantoprazole (PROTONIX) 40 MG EC tablet; Take 1 tablet by mouth 2 (Two) Times a Day Before Meals.  -     Discontinue: sucralfate (CARAFATE) 1 g tablet; Take 1 tablet by mouth 4 (Four) Times a Day.  -     pantoprazole (PROTONIX) 40 MG EC tablet; Take 1 tablet by mouth 2 (Two) Times a Day Before Meals.  -     sucralfate (CARAFATE) 1 g tablet; Take 1 tablet by mouth 4 (Four) Times a Day.    Other orders  -     risedronate (ACTONEL) 150 MG tablet; Take 1 tablet by mouth Every 30 (Thirty) Days. with water on empty stomach, nothing by mouth or lie down for next 30 minutes.          PLAN    Continue BID PPI    Return in about 1 year (around 10/31/2024).    I have discussed the above plan with the patient.  They verbalize understanding and are in agreement with the plan.  They have been advised to contact the office for any questions, concerns, or changes related to their health.

## 2023-12-04 ENCOUNTER — TRANSCRIBE ORDERS (OUTPATIENT)
Dept: ADMINISTRATIVE | Facility: HOSPITAL | Age: 68
End: 2023-12-04
Payer: MEDICARE

## 2023-12-04 DIAGNOSIS — Z12.31 SCREENING MAMMOGRAM FOR BREAST CANCER: Primary | ICD-10-CM

## 2024-01-12 ENCOUNTER — HOSPITAL ENCOUNTER (OUTPATIENT)
Dept: MAMMOGRAPHY | Facility: HOSPITAL | Age: 69
Discharge: HOME | End: 2024-01-12
Admitting: FAMILY MEDICINE
Payer: MEDICARE

## 2024-01-12 DIAGNOSIS — Z12.31 SCREENING MAMMOGRAM FOR BREAST CANCER: ICD-10-CM

## 2024-01-12 PROCEDURE — 77067 SCR MAMMO BI INCL CAD: CPT

## 2024-01-12 PROCEDURE — 77063 BREAST TOMOSYNTHESIS BI: CPT

## 2024-04-10 ENCOUNTER — HOSPITAL ENCOUNTER (OUTPATIENT)
Dept: GENERAL RADIOLOGY | Facility: HOSPITAL | Age: 69
Discharge: HOME | End: 2024-04-10
Admitting: FAMILY MEDICINE
Payer: MEDICARE

## 2024-04-10 ENCOUNTER — TRANSCRIBE ORDERS (OUTPATIENT)
Dept: ADMINISTRATIVE | Facility: HOSPITAL | Age: 69
End: 2024-04-10
Payer: MEDICARE

## 2024-04-10 DIAGNOSIS — M25.552 LEFT HIP PAIN: Primary | ICD-10-CM

## 2024-04-10 DIAGNOSIS — M25.552 LEFT HIP PAIN: ICD-10-CM

## 2024-04-10 PROCEDURE — 73502 X-RAY EXAM HIP UNI 2-3 VIEWS: CPT

## 2024-10-31 ENCOUNTER — OFFICE VISIT (OUTPATIENT)
Dept: GASTROENTEROLOGY | Facility: CLINIC | Age: 69
End: 2024-10-31
Payer: MEDICARE

## 2024-10-31 VITALS
HEIGHT: 64 IN | SYSTOLIC BLOOD PRESSURE: 122 MMHG | BODY MASS INDEX: 21.13 KG/M2 | DIASTOLIC BLOOD PRESSURE: 76 MMHG | WEIGHT: 123.8 LBS

## 2024-10-31 DIAGNOSIS — K59.04 CHRONIC IDIOPATHIC CONSTIPATION: Primary | ICD-10-CM

## 2024-10-31 DIAGNOSIS — K21.00 GASTROESOPHAGEAL REFLUX DISEASE WITH ESOPHAGITIS: ICD-10-CM

## 2024-10-31 RX ORDER — PANTOPRAZOLE SODIUM 40 MG/1
40 TABLET, DELAYED RELEASE ORAL
Qty: 180 TABLET | Refills: 3 | Status: SHIPPED | OUTPATIENT
Start: 2024-10-31

## 2024-10-31 RX ORDER — SUCRALFATE 1 G/1
1 TABLET ORAL 2 TIMES DAILY
Qty: 180 TABLET | Refills: 3 | Status: SHIPPED | OUTPATIENT
Start: 2024-10-31

## 2024-10-31 RX ORDER — CYCLOBENZAPRINE HCL 10 MG
10 TABLET ORAL 2 TIMES DAILY PRN
COMMUNITY
Start: 2024-07-16

## 2024-10-31 NOTE — PROGRESS NOTES
PATIENT INFORMATION  Lila Pabon       - 1955    CHIEF COMPLAINT  Chief Complaint   Patient presents with   • Heartburn   • Constipation   • Colon Polyps       HISTORY OF PRESENT ILLNESS    Here today for GERD follow-up    Lost  this yr so it has been really hard. 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     GERD-Managed well on BID omeprazole or sucralfate. No odynophagia, dysphagia, nausea, vomiting, abdominal pain, bloating, belching.  No NSAIDs or OTC antacids. Still doing well on BID PPI and sucralfate and breakthrough only if eating something she shouldn't. No dysphagia.    Bowels are doing ok, occasional miralax for constipation or supp but not something that is bothering her regularly. S daily/     2022 Last Colon 1/2 adenomas  2018 Last EGD for dyspepsia gastritis with candida esophagitis, reflux, no barretts or HP    On way to PCP now to complete labs.    Heartburn  She reports no abdominal pain or no nausea.   Constipation  Pertinent negatives include no abdominal pain, diarrhea, nausea or vomiting.       REVIEWED PERTINENT RESULTS/ LABS  Lab Results   Component Value Date    CASEREPORT  2022     Surgical Pathology Report                         Case: [id number]                                  Authorizing Provider:  Laureano Rehman        Collected:           2022 12:14 PM                                 MD Silvia                                                                   [facility]   Received:            2022 01:19 PM                                 OR                                                                           Pathologist:           Raul Astudillo MD                                                         Specimen:    Large Intestine, Right / Ascending Colon, ascending colon polyps x2                        FINALDX  2022     1. Ascending Colon Polyps x2 Biopsy:    A.  Fragments of sessile serrated lesion/polyp (see comment).   B. Fragments of hyperplastic polyp.   C. No glandular dysplasia nor malignancy identified.    jat/jse        Lab Results   Component Value Date    HGB 9.5 (L) 04/14/2021    MCV 88.2 04/05/2021     04/05/2021    ALT 50 (H) 12/05/2018    AST 53 (H) 12/05/2018      No results found.    REVIEW OF SYSTEMS  Review of Systems   Constitutional: Negative.    HENT: Negative.  Negative for trouble swallowing.    Eyes: Negative.    Respiratory: Negative.     Cardiovascular: Negative.    Gastrointestinal:  Positive for constipation. Negative for abdominal pain, diarrhea, nausea and vomiting.        GERD, PHX polyps   Endocrine: Negative.    Genitourinary: Negative.    Musculoskeletal: Negative.    Skin: Negative.    Allergic/Immunologic: Negative.    Neurological: Negative.    Hematological:  Bruises/bleeds easily.   Psychiatric/Behavioral: Negative.           ACTIVE PROBLEMS  Patient Active Problem List    Diagnosis    • Encounter for screening for malignant neoplasm of colon [Z12.11]    • Spinal stenosis, lumbar region, with neurogenic claudication [M48.062]    • Spinal stenosis, lumbar region with neurogenic claudication [M48.062]    • Candidal esophagitis [B37.81]    • Iron deficiency anemia due to chronic blood loss [D50.0]    • Personal history of colonic polyps [Z86.0100]    • Gastroesophageal reflux disease with esophagitis [K21.00]    • Adenomatous polyp of colon [D12.6]    • Stress-induced cardiomyopathy [I51.81]    • Type 2 myocardial infarction [I21.A1]    • Esophagitis [K20.90]          PAST MEDICAL HISTORY  Past Medical History:   Diagnosis Date   • Anemia     IRON SUPPLEMENTS   • Arthritis    • Colon polyp    • Constipation    • DDD (degenerative disc disease), lumbar    • Environmental allergies    • GERD (gastroesophageal reflux disease)    • Hypertension    • Insomnia    • Lumbar disc herniation    • Lumbar stenosis    • Numbness and tingling of  left lower extremity    • Poor balance    • Stress-induced cardiomyopathy 1/15/2019   • Type 2 myocardial infarction 1/15/2019         SURGICAL HISTORY  Past Surgical History:   Procedure Laterality Date   • CARDIAC CATHETERIZATION N/A 6/26/2018    Procedure: Left Heart Cath and Cors;  Surgeon: Suhas Almonte MD;  Location: [facility] CATH INVASIVE LOCATION;  Service: Cardiovascular   • CARDIAC CATHETERIZATION N/A 6/26/2018    Procedure: Left ventriculography;  Surgeon: Suhas Almonte MD;  Location: [facility] CATH INVASIVE LOCATION;  Service: Cardiovascular   • CARDIAC CATHETERIZATION N/A 12/10/2018    Procedure: Right and Left Heart Cath;  Surgeon: Laquita Jessica MD;  Location: [facility] CATH INVASIVE LOCATION;  Service: Cardiology   • CARDIAC CATHETERIZATION N/A 12/10/2018    Procedure: Coronary angiography;  Surgeon: Laquita Jessica MD;  Location: [facility] CATH INVASIVE LOCATION;  Service: Cardiology   • CARDIAC CATHETERIZATION N/A 12/10/2018    Procedure: Left ventriculography;  Surgeon: Laquita Jessica MD;  Location: [facility] CATH INVASIVE LOCATION;  Service: Cardiology   • CATARACT EXTRACTION Bilateral    • CATARACT EXTRACTION     • COLONOSCOPY     • COLONOSCOPY W/ POLYPECTOMY N/A 2/25/2022    Procedure: COLONOSCOPY WITH POLYPECTOMY;  Surgeon: Laureano Rehman MD;  Location: [facility];  Service: Gastroenterology;  Laterality: N/A;  ascending colon polyps x2 (hot snare x2)   • ENDOSCOPY N/A 6/27/2018    Procedure: ESOPHAGOGASTRODUODENOSCOPY with biopsies;  Surgeon: Rupa Hays MD;  Location: [facility] ENDOSCOPY;  Service: Gastroenterology   • ENDOSCOPY N/A 9/18/2018    Procedure: ESOPHAGOGASTRODUODENOSCOPY with biopsies;  Surgeon: Rupa Hays MD;  Location: [facility] ENDOSCOPY;  Service: Gastroenterology   • ENDOSCOPY N/A 12/7/2018    Procedure: ESOPHAGOGASTRODUODENOSCOPY JWITH BIOPSIES;  Surgeon: Laureano Rehman MD;  Location: [facility] OR;  Service: Gastroenterology   • EPIDURAL  BLOCK     • GLAUCOMA SURGERY     • HYSTERECTOMY     • LUMBAR FUSION N/A 2021    Procedure: Lumbar 4-lumbar 5 laminectomy and fusion with local bone graft;  Surgeon: Raul Cantu MD;  Location: [facility] OR;  Service: Orthopedic Spine;  Laterality: N/A;   • SHOULDER SURGERY Right     RUPTURED BICEP TENDON         FAMILY HISTORY  Family History   Problem Relation Age of Onset   • Breast cancer Mother    • Breast cancer Maternal Aunt    • Colon polyps Son    • Colon cancer Neg Hx    • Malig Hyperthermia Neg Hx          SOCIAL HISTORY  Social History     Occupational History   • Not on file   Tobacco Use   • Smoking status: Former     Current packs/day: 0.00     Average packs/day: 3.0 packs/day for 44.0 years (132.0 ttl pk-yrs)     Types: Cigarettes     Start date: 1966     Quit date: 2010     Years since quittin.8   • Smokeless tobacco: Never   • Tobacco comments:     Started smoking at age 11   Vaping Use   • Vaping status: Never Used   Substance and Sexual Activity   • Alcohol use: Not Currently     Comment: Rare   • Drug use: No   • Sexual activity: Not Currently     Partners: Male     Birth control/protection: Post-menopausal     Comment: Hystorectomy in          CURRENT MEDICATIONS    Current Outpatient Medications:   •  aspirin 81 MG EC tablet, Take 1 tablet by mouth Daily., Disp: , Rfl:   •  calcium carb-cholecalciferol 600-800 MG-UNIT tablet, Take 1 tablet by mouth Every Night., Disp: , Rfl:   •  Cholecalciferol (Vitamin D3) 50 MCG capsule, Take 1 capsule by mouth Daily., Disp: , Rfl:   •  cyclobenzaprine (FLEXERIL) 10 MG tablet, Take 1 tablet by mouth 2 (Two) Times a Day As Needed., Disp: , Rfl:   •  docusate sodium (COLACE) 100 MG capsule, Take 1 capsule by mouth Every Night., Disp: , Rfl:   •  estrogens, conjugated, (PREMARIN) 0.3 MG tablet, Take 1 tablet by mouth Every Night. Take daily for 21 days then do not take for 7 days., Disp: , Rfl:   •  ferrous sulfate 140 (45 Fe)  "MG tablet controlled-release tablet, Take 1 tablet by mouth Every Night., Disp: , Rfl:   •  fluticasone (FLONASE) 50 MCG/ACT nasal spray, Administer 2 sprays into the nostril(s) as directed by provider Every Night., Disp: , Rfl:   •  losartan (COZAAR) 25 MG tablet, Take 1 tablet by mouth Every Night., Disp: , Rfl:   •  pantoprazole (PROTONIX) 40 MG EC tablet, Take 1 tablet by mouth 2 (Two) Times a Day Before Meals., Disp: 180 tablet, Rfl: 3  •  risedronate (ACTONEL) 150 MG tablet, Take 1 tablet by mouth Every 30 (Thirty) Days. with water on empty stomach, nothing by mouth or lie down for next 30 minutes., Disp: , Rfl:   •  sucralfate (CARAFATE) 1 g tablet, Take 1 tablet by mouth 2 (Two) Times a Day., Disp: 180 tablet, Rfl: 3  •  traZODone (DESYREL) 100 MG tablet, Take 1 tablet by mouth Every Night., Disp: , Rfl:     ALLERGIES  Mobic [meloxicam]    VITALS  Vitals:    10/31/24 0813   BP: 122/76   BP Location: Left arm   Patient Position: Sitting   Cuff Size: Adult   Weight: 56.2 kg (123 lb 12.8 oz)   Height: 163.8 cm (64.49\")       PHYSICAL EXAM  Debilities/Disabilities Identified: None  Emotional Behavior: Appropriate  Wt Readings from Last 3 Encounters:   10/31/24 56.2 kg (123 lb 12.8 oz)   10/31/23 59.5 kg (131 lb 3.2 oz)   01/11/23 55.8 kg (123 lb)     Ht Readings from Last 1 Encounters:   10/31/24 163.8 cm (64.49\")     Body mass index is 20.93 kg/m².  Physical Exam  Constitutional:       General: She is not in acute distress.     Appearance: Normal appearance. She is not ill-appearing.   HENT:      Head: Normocephalic and atraumatic.      Mouth/Throat:      Mouth: Mucous membranes are moist.      Pharynx: No posterior oropharyngeal erythema.   Eyes:      General: No scleral icterus.  Cardiovascular:      Rate and Rhythm: Normal rate and regular rhythm.      Heart sounds: Normal heart sounds.   Pulmonary:      Effort: Pulmonary effort is normal.      Breath sounds: Normal breath sounds.   Abdominal:      General: " Abdomen is flat. Bowel sounds are normal. There is no distension.      Palpations: Abdomen is soft. There is no mass.      Tenderness: There is no abdominal tenderness. There is no guarding or rebound. Negative signs include Mcmahon's sign.      Hernia: No hernia is present.   Musculoskeletal:      Cervical back: Neck supple.   Skin:     General: Skin is warm.      Capillary Refill: Capillary refill takes less than 2 seconds.   Neurological:      General: No focal deficit present.      Mental Status: She is alert and oriented to person, place, and time.   Psychiatric:         Mood and Affect: Mood normal.         Behavior: Behavior normal.         Thought Content: Thought content normal.         Judgment: Judgment normal.       CLINICAL DATA REVIEWED   reviewed previous lab results and integrated with today's visit, reviewed notes from other physicians and/or last GI encounter, reviewed previous endoscopy results and available photos, reviewed surgical pathology results from previous biopsies    ASSESSMENT  Diagnoses and all orders for this visit:    Chronic idiopathic constipation    Gastroesophageal reflux disease with esophagitis  -     pantoprazole (PROTONIX) 40 MG EC tablet; Take 1 tablet by mouth 2 (Two) Times a Day Before Meals.  -     sucralfate (CARAFATE) 1 g tablet; Take 1 tablet by mouth 2 (Two) Times a Day.    Other orders  -     Cholecalciferol (Vitamin D3) 50 MCG capsule; Take 1 capsule by mouth Daily.  -     cyclobenzaprine (FLEXERIL) 10 MG tablet; Take 1 tablet by mouth 2 (Two) Times a Day As Needed.          PLAN    GERD: Continue current meds  CIC: Managed well with docusate    Return in about 1 year (around 10/31/2025).    I have discussed the above plan with the patient.  They verbalize understanding and are in agreement with the plan.  They have been advised to contact the office for any questions, concerns, or changes related to their health.

## 2024-12-13 ENCOUNTER — TRANSCRIBE ORDERS (OUTPATIENT)
Dept: ADMINISTRATIVE | Facility: HOSPITAL | Age: 69
End: 2024-12-13
Payer: MEDICARE

## 2024-12-13 DIAGNOSIS — Z78.0 ASYMPTOMATIC POSTMENOPAUSAL STATUS: ICD-10-CM

## 2024-12-13 DIAGNOSIS — Z13.820 ENCOUNTER FOR SCREENING FOR OSTEOPOROSIS: Primary | ICD-10-CM

## 2024-12-18 ENCOUNTER — TRANSCRIBE ORDERS (OUTPATIENT)
Dept: ADMINISTRATIVE | Facility: HOSPITAL | Age: 69
End: 2024-12-18
Payer: MEDICARE

## 2024-12-18 DIAGNOSIS — Z12.31 OTHER SCREENING MAMMOGRAM: Primary | ICD-10-CM

## 2025-02-24 ENCOUNTER — HOSPITAL ENCOUNTER (OUTPATIENT)
Dept: MAMMOGRAPHY | Facility: HOSPITAL | Age: 70
Discharge: HOME | End: 2025-02-24
Payer: MEDICARE

## 2025-02-24 ENCOUNTER — APPOINTMENT (OUTPATIENT)
Dept: BONE DENSITY | Facility: HOSPITAL | Age: 70
End: 2025-02-24
Payer: MEDICARE

## 2025-02-24 DIAGNOSIS — Z12.31 OTHER SCREENING MAMMOGRAM: ICD-10-CM

## 2025-02-24 DIAGNOSIS — Z78.0 ASYMPTOMATIC POSTMENOPAUSAL STATUS: ICD-10-CM

## 2025-02-24 DIAGNOSIS — Z13.820 ENCOUNTER FOR SCREENING FOR OSTEOPOROSIS: ICD-10-CM

## 2025-02-24 PROCEDURE — 77080 DXA BONE DENSITY AXIAL: CPT

## 2025-02-24 PROCEDURE — 77063 BREAST TOMOSYNTHESIS BI: CPT

## 2025-02-24 PROCEDURE — 77067 SCR MAMMO BI INCL CAD: CPT

## 2025-02-24 PROCEDURE — 77063 BREAST TOMOSYNTHESIS BI: CPT | Performed by: RADIOLOGY

## 2025-02-24 PROCEDURE — 77067 SCR MAMMO BI INCL CAD: CPT | Performed by: RADIOLOGY

## 2025-06-03 ENCOUNTER — HOSPITAL ENCOUNTER (OUTPATIENT)
Dept: GENERAL RADIOLOGY | Facility: HOSPITAL | Age: 70
Discharge: HOME | End: 2025-06-03
Payer: MEDICARE

## 2025-06-03 ENCOUNTER — TELEPHONE (OUTPATIENT)
Dept: ORTHOPEDIC SURGERY | Facility: CLINIC | Age: 70
End: 2025-06-03
Payer: MEDICARE

## 2025-06-03 DIAGNOSIS — S89.91XA INJURY OF RIGHT KNEE, INITIAL ENCOUNTER: ICD-10-CM

## 2025-06-03 PROCEDURE — 73562 X-RAY EXAM OF KNEE 3: CPT

## 2025-06-03 NOTE — TELEPHONE ENCOUNTER
Received a bubble message from [facility] in [place] asking to schedule this patient for right knee pain with Dr. Burdick.     Attempted to call patient to schedule appointment, but was unable to leave a voicemail as her voicemail is full.     If patient calls back please send call to office, anyone can schedule patient with Dr. Burdick, first available.

## (undated) DEVICE — ADAPT CLN BIOGUARD AIR/H2O DISP

## (undated) DEVICE — BITEBLOCK OMNI BLOC

## (undated) DEVICE — SAFELINER SUCTION CANISTER 1000CC: Brand: DEROYAL

## (undated) DEVICE — SPNG GZ WOVN 4X4IN 12PLY 10/BX STRL

## (undated) DEVICE — FRCP BX RADJAW4 NDL 2.8 240CM LG OG BX40

## (undated) DEVICE — SUCTION CANISTER, 3000CC,SAFELINER: Brand: DEROYAL

## (undated) DEVICE — SPK10277 JACKSON/PRO-AXIS KIT: Brand: SPK10277 JACKSON/PRO-AXIS KIT

## (undated) DEVICE — Device

## (undated) DEVICE — SUT VIC 1 OS8 27IN J699H

## (undated) DEVICE — KT MANIFLD CARDIAC

## (undated) DEVICE — CODMAN® SURGICAL PATTIES 1/2" X 3" (1.27CM X 7.62CM): Brand: CODMAN®

## (undated) DEVICE — PENCL E/S ULTRAVAC TELESCP NOSE HOLSTR 10FT

## (undated) DEVICE — GLIDESHEATH BASIC HYDROPHILIC COATED INTRODUCER SHEATH: Brand: GLIDESHEATH

## (undated) DEVICE — BW-412T DISP COMBO CLEANING BRUSH: Brand: SINGLE USE COMBINATION CLEANING BRUSH

## (undated) DEVICE — THE BITE BLOCK MAXI, LATEX FREE STRAP IS USED TO PROTECT THE ENDOSCOPE INSERTION TUBE FROM BEING BITTEN BY THE PATIENT.

## (undated) DEVICE — PAD,ABDOMINAL,8"X10",ST,LF: Brand: MEDLINE

## (undated) DEVICE — MASK,FACE,FLUID RESIST,SHLD,EARLOOP: Brand: MEDLINE

## (undated) DEVICE — PK CATH CARD 40

## (undated) DEVICE — CATH DIAG IMPULSE FL3.5 5F 100CM

## (undated) DEVICE — ANTIBACTERIAL UNDYED BRAIDED (POLYGLACTIN 910), SYNTHETIC ABSORBABLE SUTURE: Brand: COATED VICRYL

## (undated) DEVICE — Device: Brand: DEFENDO AIR/WATER/SUCTION AND BIOPSY VALVE

## (undated) DEVICE — DRSNG GZ PETROLTM XEROFORM CURAD 1X8IN STRL

## (undated) DEVICE — TUBING, SUCTION, 1/4" X 10', STRAIGHT: Brand: MEDLINE

## (undated) DEVICE — SYR LL 3CC

## (undated) DEVICE — TBG 02 CRUSH RESIST LF CLR 7FT

## (undated) DEVICE — HANDPIECE SET WITH COAXIAL HIGH FLOW TIP AND SUCTION TUBE: Brand: INTERPULSE

## (undated) DEVICE — DISPOSABLE BIPOLAR FORCEPS 7 3/4" (19.7CM) SCOVILLE BAYONET, 1.5MM TIP AND 12 FT. (3.6M) CABLE: Brand: KIRWAN

## (undated) DEVICE — JACKT LAB F/R KNIT CUFF/COLR XLG BLU

## (undated) DEVICE — SUT MNCRYL PLS ANTIB UD 4/0 PS2 18IN

## (undated) DEVICE — CANN NASL CO2 TRULINK W/O2 A/

## (undated) DEVICE — SNAR POLYP SENSATION STDOVL 27 240 BX40

## (undated) DEVICE — KT ORCA ORCAPOD DISP STRL

## (undated) DEVICE — THE SINGLE USE ETRAP – POLYP TRAP IS USED FOR SUCTION RETRIEVAL OF ENDOSCOPICALLY REMOVED POLYPS.: Brand: ETRAP

## (undated) DEVICE — GOWN,ECLIPSE,FABRIC-REINFORCED,X-LARGE: Brand: MEDLINE

## (undated) DEVICE — GLV SURG PREMIERPRO ORTHO LTX PF SZ8 BRN

## (undated) DEVICE — GLV SURG BIOGEL LTX PF 7 1/2

## (undated) DEVICE — PAD GRND E/S W/CORD SPLT A/

## (undated) DEVICE — APPL CHLORAPREP HI/LITE 26ML ORNG

## (undated) DEVICE — CATH DIAG IMPULSE FR4 5F 100CM

## (undated) DEVICE — ADHS SKIN SURG TISS VISC PREMIERPRO EXOFIN HI/VISC FAST/DRY

## (undated) DEVICE — VIAL FORMALIN CAP 10P 40ML

## (undated) DEVICE — BONE MARROW ASPIRATION NEEDLES ASPIRATOR KIT 3-HOLE 4 INCHES NDLE

## (undated) DEVICE — TOTAL TRAY, 16FR 10ML SIL FOLEY, URN: Brand: MEDLINE

## (undated) DEVICE — GW EMR FIX EXCHG J STD .035 3MM 260CM

## (undated) DEVICE — CATH VENT MIV RADL PIG ST TIP 5F 110CM

## (undated) DEVICE — GLV SURG SENSICARE MICRO PF LF 7.5 STRL

## (undated) DEVICE — PATIENT RETURN ELECTRODE, SINGLE-USE, CONTACT QUALITY MONITORING, ADULT, WITH 9FT CORD, FOR PATIENTS WEIGING OVER 33LBS. (15KG): Brand: MEGADYNE

## (undated) DEVICE — INTENDED FOR TISSUE SEPARATION, AND OTHER PROCEDURES THAT REQUIRE A SHARP SURGICAL BLADE TO PUNCTURE OR CUT.: Brand: BARD-PARKER ® STAINLESS STEEL BLADES

## (undated) DEVICE — SPONGE,LAP,12"X12",XR,ST,5/PK,40PK/CS: Brand: MEDLINE

## (undated) DEVICE — GOWN ISOL W/THUMB UNIV BLU BX/15

## (undated) DEVICE — PK NEURO SPINE 40

## (undated) DEVICE — MEDI-VAC YANKAUER SUCTION HANDLE W/BULBOUS TIP: Brand: CARDINAL HEALTH

## (undated) DEVICE — GLV SURG SENSICARE PI MIC PF SZ7.5 LF STRL

## (undated) DEVICE — TRAP FLD MINIVAC MEGADYNE 100ML

## (undated) DEVICE — GLV SURG BIOGEL LTX PF 7

## (undated) DEVICE — CATH DIAG IMPULSE FR5 5F 100CM

## (undated) DEVICE — SOL NACL 0.9PCT 1000ML

## (undated) DEVICE — BALN PRESS WEDGE 5F 110CM

## (undated) DEVICE — TOOL MR8-15BA50T MR8 15CM BAL SYMTRI 5MM: Brand: MIDAS REX MR8

## (undated) DEVICE — CATH DIAG IMPULSE PIG 5F 100CM